# Patient Record
Sex: MALE | Race: BLACK OR AFRICAN AMERICAN | NOT HISPANIC OR LATINO | Employment: UNEMPLOYED | URBAN - METROPOLITAN AREA
[De-identification: names, ages, dates, MRNs, and addresses within clinical notes are randomized per-mention and may not be internally consistent; named-entity substitution may affect disease eponyms.]

---

## 2020-10-02 ENCOUNTER — TRANSCRIBE ORDERS (OUTPATIENT)
Dept: LAB | Facility: HOSPITAL | Age: 40
End: 2020-10-02

## 2020-10-02 ENCOUNTER — LAB (OUTPATIENT)
Dept: LAB | Facility: HOSPITAL | Age: 40
End: 2020-10-02

## 2020-10-02 DIAGNOSIS — E78.5 HYPERLIPIDEMIA, UNSPECIFIED HYPERLIPIDEMIA TYPE: ICD-10-CM

## 2020-10-02 DIAGNOSIS — E78.5 HYPERLIPIDEMIA, UNSPECIFIED HYPERLIPIDEMIA TYPE: Primary | ICD-10-CM

## 2020-10-02 LAB
ALBUMIN SERPL-MCNC: 4.2 G/DL (ref 3.5–5.2)
ALP SERPL-CCNC: 52 U/L (ref 39–117)
ALT SERPL W P-5'-P-CCNC: 17 U/L (ref 1–41)
AST SERPL-CCNC: 18 U/L (ref 1–40)
BILIRUB CONJ SERPL-MCNC: <0.2 MG/DL (ref 0–0.3)
BILIRUB INDIRECT SERPL-MCNC: NORMAL MG/DL
BILIRUB SERPL-MCNC: 0.4 MG/DL (ref 0–1.2)
CHOLEST SERPL-MCNC: 86 MG/DL (ref 0–200)
HDLC SERPL-MCNC: 27 MG/DL (ref 40–60)
LDLC SERPL CALC-MCNC: 32 MG/DL (ref 0–100)
LDLC/HDLC SERPL: 1.19 {RATIO}
PROT SERPL-MCNC: 7.4 G/DL (ref 6–8.5)
TRIGL SERPL-MCNC: 135 MG/DL (ref 0–150)
VLDLC SERPL-MCNC: 27 MG/DL (ref 5–40)

## 2020-10-02 PROCEDURE — 36415 COLL VENOUS BLD VENIPUNCTURE: CPT

## 2020-10-02 PROCEDURE — 80061 LIPID PANEL: CPT | Performed by: INTERNAL MEDICINE

## 2020-10-02 PROCEDURE — 80076 HEPATIC FUNCTION PANEL: CPT | Performed by: INTERNAL MEDICINE

## 2021-01-25 ENCOUNTER — TRANSCRIBE ORDERS (OUTPATIENT)
Dept: PHYSICAL THERAPY | Facility: CLINIC | Age: 41
End: 2021-01-25

## 2021-01-25 DIAGNOSIS — M54.50 LOW BACK PAIN, UNSPECIFIED BACK PAIN LATERALITY, UNSPECIFIED CHRONICITY, UNSPECIFIED WHETHER SCIATICA PRESENT: Primary | ICD-10-CM

## 2021-01-28 ENCOUNTER — TREATMENT (OUTPATIENT)
Dept: PHYSICAL THERAPY | Facility: CLINIC | Age: 41
End: 2021-01-28

## 2021-01-28 DIAGNOSIS — M54.16 RADICULOPATHY, LUMBAR REGION: Primary | ICD-10-CM

## 2021-01-28 PROCEDURE — 97163 PT EVAL HIGH COMPLEX 45 MIN: CPT | Performed by: PHYSICAL THERAPIST

## 2021-01-28 PROCEDURE — 97110 THERAPEUTIC EXERCISES: CPT | Performed by: PHYSICAL THERAPIST

## 2021-01-28 PROCEDURE — 97140 MANUAL THERAPY 1/> REGIONS: CPT | Performed by: PHYSICAL THERAPIST

## 2021-01-28 NOTE — PROGRESS NOTES
Physical Therapy Initial Evaluation and Plan of Care      Patient: Olivier Swift   : 1980  Diagnosis/ICD-10 Code:  Radiculopathy, lumbar region [M54.16]  Referring practitioner: [facility] Beckwith    Subjective Evaluation    History of Present Illness  Date of onset: 2015  Mechanism of injury: Insidious in nature    Subjective comment: Reports having low back pain going back into his early 20s.  States that the symptoms used to be a locking sensation that would eventually go away in a few days.  Has had more severe low back pain and left leg pain that has been present since .  Pain travels down the Denies fever, chills, night sweats and changes in B/B function.  Patient Occupation: Not working Quality of life: good    Pain  At worst pain rating: 10  Quality: radiating, sharp, tight and pulling  Alleviating factors: Sitting; lying on R side.  Exacerbated by: Sitting >60 min; lying on back; walking; standing; pushups; situps (puling in back)  Progression: worsening    Social Support  Lives in: apartment  Lives with: young children and spouse    Treatments  Previous treatment: medication  Patient Goals  Patient goals for therapy: return to sport/leisure activities, decreased pain and increased motion             Objective          Neurological Testing     Reflexes   Left   Patellar (L4): absent (0)  Achilles (S1): absent (0)  Clonus sign: negative    Right   Patellar (L4): absent (0)  Achilles (S1): absent (0)  Clonus sign: negative    Additional Neurological Details  (+) Slump L ankle DF->pulling in lateral lower leg and L/S    Active Range of Motion     Lumbar   Flexion: 70 degrees   Extension: 5 (upper L/S p!) degrees   Left lateral flexion: 45 degrees   Right lateral flexion: 20 degrees     Strength/Myotome Testing     Left Ankle/Foot   Great toe extension: 5    Right Ankle/Foot   Dorsiflexion: 3- (pulling in lower leg prevents full motion)  Great toe extension: 5          Assessment & Plan      Assessment  Impairments: abnormal or restricted ROM, lacks appropriate home exercise program and pain with function  Impairments comments: no trunk rotation  Assessment details: Mr. Swift is a 40 year old male that presents to physical therapy with a longstanding history of low back pain since his early 20s and a 5 year history of L LE pain.  PMH was covered during interview.  No constitutional signs or symptoms present.  Neurological exam is unremarkable.  (+) Slump test in L LE for reproduction of L LE pain along lateral thigh and lateral lower leg.  L/S AROM is limited into extension.  Focus of care will be placed on reducing L LE pain, improving spinal mobility and progress to trunk and hip mm activation strategies.    Prognosis: good  Functional Limitations: carrying objects, lifting, sleeping, walking, pulling, pushing, uncomfortable because of pain, sitting and unable to perform repetitive tasks  Goals  Plan Goals: STGs:  1.)  SANCHEZ improved x 1 MCID in 4 weeks.  2.)  Self report at least 50% improvement in 6 weeks.  LTGs:  1.)  Have no c/o L LE pain in 8 weeks.  2.)  Report no sleep disturbance from low back or left leg pain in 10 weeks.  3.)  Return to a personal exercise program and all ADLs/iADLs without pain or limitations in 12 weeks.    Plan  Therapy options: will be seen for skilled physical therapy services  Planned modality interventions: thermotherapy (hydrocollator packs), cryotherapy and high voltage pulsed current (pain management)  Planned therapy interventions: abdominal trunk stabilization, manual therapy, motor coordination training, neuromuscular re-education, therapeutic activities, transfer training, home exercise program, gait training and ADL retraining  Frequency: 2x week  Duration in visits: 20  Duration in weeks: 10  Treatment plan discussed with: patient        Manual Therapy:    12     mins  32556;  Therapeutic Exercise:    15     mins  60621;     Neuromuscular Ana Paula:         mins  12340;    Therapeutic Activity:          mins  93084;     Gait Training:           mins  81181;     Ultrasound:          mins  16596;    Electrical Stimulation:         mins  33449 ( );  Dry Needling          mins self-pay    Timed Treatment:   27   mins   Total Treatment:     60   mins    PT SIGNATURE: Gomez Agustin, PT   DATE TREATMENT INITIATED: 1/28/2021    Initial Certification Certification Period: 4/28/2021  I certify that the therapy services are furnished while this patient is under my care.  The services outlined above are required by this patient, and will be reviewed every 90 days.     PHYSICIAN: Pastora Carbajal, APRN      DATE:     Please sign and return via fax to [phone].. Thank you, [facility].

## 2021-02-05 ENCOUNTER — TREATMENT (OUTPATIENT)
Dept: PHYSICAL THERAPY | Facility: CLINIC | Age: 41
End: 2021-02-05

## 2021-02-05 DIAGNOSIS — M54.42 CHRONIC MIDLINE LOW BACK PAIN WITH LEFT-SIDED SCIATICA: Primary | ICD-10-CM

## 2021-02-05 DIAGNOSIS — G89.29 CHRONIC MIDLINE LOW BACK PAIN WITH LEFT-SIDED SCIATICA: Primary | ICD-10-CM

## 2021-02-05 PROCEDURE — 97140 MANUAL THERAPY 1/> REGIONS: CPT | Performed by: PHYSICAL THERAPIST

## 2021-02-05 PROCEDURE — 97530 THERAPEUTIC ACTIVITIES: CPT | Performed by: PHYSICAL THERAPIST

## 2021-02-05 PROCEDURE — 97110 THERAPEUTIC EXERCISES: CPT | Performed by: PHYSICAL THERAPIST

## 2021-02-05 NOTE — PROGRESS NOTES
Physical Therapy Daily Progress Note    Patient: Olivier Swift   : 1980  Diagnosis/ICD-10 Code:  Chronic midline low back pain with left-sided sciatica [M54.42, G89.29]  Referring practitioner: [facility] Beckwith  Date of Initial Visit: Type: THERAPY  Noted: 2021  Today's Date: 2021  Patient seen for 2 sessions         Olivier Swift reports that he felt soreness in his low back after his last visit.  Has worked out at home, and has had muscle soreness from his workout.  No longer using mm relaxer or NSAIDs prescribed by the [facility] as they were not changing his symptoms.      Subjective     Objective   See Exercise, Manual, and Modality Logs for complete treatment.       Assessment/Plan  Very sensitive along the L lower L/S, L lateral thigh and L lateral lower leg to skin sliding.  Added more transverse and sagittal plane motions in the trunk to improve reduce tissue stiffness.             Manual Therapy:    10     mins  89598;  Therapeutic Exercise:    15     mins  16188;     Neuromuscular Ana Paula:        mins  57256;    Therapeutic Activity:     15     mins  28511;     Gait Training:           mins  19232;     Ultrasound:          mins  02450;    Electrical Stimulation:         mins  18566 ( );  Dry Needling          mins self-pay    Timed Treatment:   40   mins   Total Treatment:     40   mins    Gomez Agustin PT  Physical Therapist

## 2021-03-08 ENCOUNTER — TREATMENT (OUTPATIENT)
Dept: PHYSICAL THERAPY | Facility: CLINIC | Age: 41
End: 2021-03-08

## 2021-03-08 DIAGNOSIS — G89.29 CHRONIC MIDLINE LOW BACK PAIN WITH LEFT-SIDED SCIATICA: Primary | ICD-10-CM

## 2021-03-08 DIAGNOSIS — M54.42 CHRONIC MIDLINE LOW BACK PAIN WITH LEFT-SIDED SCIATICA: Primary | ICD-10-CM

## 2021-03-08 PROCEDURE — 97140 MANUAL THERAPY 1/> REGIONS: CPT | Performed by: PHYSICAL THERAPIST

## 2021-03-08 PROCEDURE — 97530 THERAPEUTIC ACTIVITIES: CPT | Performed by: PHYSICAL THERAPIST

## 2021-03-08 PROCEDURE — 97110 THERAPEUTIC EXERCISES: CPT | Performed by: PHYSICAL THERAPIST

## 2021-03-08 NOTE — PROGRESS NOTES
Physical Therapy Daily Progress Note    Patient: Olivier Swift   : 1980  Diagnosis/ICD-10 Code:  Chronic midline low back pain with left-sided sciatica [M54.42, G89.29]  Referring practitioner: [facility] Beckwith  Date of Initial Visit: Type: THERAPY  Noted: 2021  Today's Date: 3/8/2021  Patient seen for 3 sessions         Olivier Swift reports that he feels better in terms of being able to bend over and touch his feet.  States that he does have stiffness and pain in the back of the thigh to the back of the ankle when driving (going back and forth from gas to brake).  Feels about 50% improvement since starting physical therapy.      Subjective     Objective   See Exercise, Manual, and Modality Logs for complete treatment.   *SANCHEZ:  44% (21):  SANCHEZ (3/8/21):  26%  *AROM trunk flx/ext:  80 deg/15 deg  *(+) Slump->full ankle DF->20 deg knee ext    Assessment/Plan   Mr. Swift is a 40 year old male that has attended physical therapy for a total of 3 visits for L/S and L LE radiculopathic and pain/symptoms.  Trunk mobility has improved in the sagittal plane, still has stiffness and pain in the lower leg along the tibial nn pathway with slump testing.  He is ambulating more with trunk rotation.  Will focus care on improving L LE neural structure mobility and sensitivity before discharge to [facility].         Manual Therapy:    10     mins  65697;  Therapeutic Exercise:    20     mins  25900;     Neuromuscular Ana Paula:        mins  14025;    Therapeutic Activity:     15     mins  71167;     Gait Training:           mins  12128;     Ultrasound:          mins  89824;    Electrical Stimulation:         mins  88558 ( );  Dry Needling          mins self-pay    Timed Treatment:   45   mins   Total Treatment:     45   mins    Gomez Agustin, PT  Physical Therapist

## 2021-03-12 ENCOUNTER — TREATMENT (OUTPATIENT)
Dept: PHYSICAL THERAPY | Facility: CLINIC | Age: 41
End: 2021-03-12

## 2021-03-12 DIAGNOSIS — M54.42 CHRONIC MIDLINE LOW BACK PAIN WITH LEFT-SIDED SCIATICA: Primary | ICD-10-CM

## 2021-03-12 DIAGNOSIS — G89.29 CHRONIC MIDLINE LOW BACK PAIN WITH LEFT-SIDED SCIATICA: Primary | ICD-10-CM

## 2021-03-12 PROCEDURE — 97140 MANUAL THERAPY 1/> REGIONS: CPT | Performed by: PHYSICAL THERAPIST

## 2021-03-12 PROCEDURE — 97110 THERAPEUTIC EXERCISES: CPT | Performed by: PHYSICAL THERAPIST

## 2021-03-14 NOTE — PROGRESS NOTES
Physical Therapy Daily Progress Note    Patient: Olivier Swift   : 1980  Diagnosis/ICD-10 Code:  Chronic midline low back pain with left-sided sciatica [M54.42, G89.29]  Referring practitioner: HILL Beckwith  Date of Initial Visit: Type: THERAPY  Noted: 2021  Today's Date: 3/14/2021  Patient seen for 4 sessions         Olivier Swift reports that he is able to perform his exercises at home with more ease and mobility in his trunk.  Is able to bend down to the floor and reach with much less stiffness.  Still has a day after physical therapy visit with increased stiffness and soreness.    Subjective     Objective   See Exercise, Manual, and Modality Logs for complete treatment.       Assessment/Plan  Focused visit on decreasing skin sensitivity along the posterior trunk and L lateral hip, thigh and lower leg.  Combined with review of HEP in terms of dosage and rationale.    Manual Therapy:    30     mins  37744;  Therapeutic Exercise:    9     mins  87219;     Neuromuscular Ana Paula:        mins  63453;    Therapeutic Activity:          mins  61930;     Gait Training:           mins  43133;     Ultrasound:          mins  73311;    Electrical Stimulation:         mins  56928 ( );  Dry Needling          mins self-pay    Timed Treatment:   39   mins   Total Treatment:     39   mins    Gomez Agustin, PT  Physical Therapist

## 2021-03-22 ENCOUNTER — TREATMENT (OUTPATIENT)
Dept: PHYSICAL THERAPY | Facility: CLINIC | Age: 41
End: 2021-03-22

## 2021-03-22 DIAGNOSIS — G89.29 CHRONIC MIDLINE LOW BACK PAIN WITH LEFT-SIDED SCIATICA: Primary | ICD-10-CM

## 2021-03-22 DIAGNOSIS — M54.42 CHRONIC MIDLINE LOW BACK PAIN WITH LEFT-SIDED SCIATICA: Primary | ICD-10-CM

## 2021-03-22 PROCEDURE — 97110 THERAPEUTIC EXERCISES: CPT | Performed by: PHYSICAL THERAPIST

## 2021-03-22 PROCEDURE — 97530 THERAPEUTIC ACTIVITIES: CPT | Performed by: PHYSICAL THERAPIST

## 2021-03-22 PROCEDURE — 97140 MANUAL THERAPY 1/> REGIONS: CPT | Performed by: PHYSICAL THERAPIST

## 2021-03-22 NOTE — PROGRESS NOTES
Physical Therapy Daily Progress Note    Patient: Olivier Swift   : 1980  Diagnosis/ICD-10 Code:  Chronic midline low back pain with left-sided sciatica [M54.42, G89.29]  Referring practitioner: HILL Beckwith  Date of Initial Visit: Type: THERAPY  Noted: 2021  Today's Date: 3/22/2021  Patient seen for 5 sessions         States that his low back pain is still improving, has less stiffness in his spine and is able to workout with less difficulty at home.      Subjective     Objective   See Exercise, Manual, and Modality Logs for complete treatment.       Assessment/Plan  Focused on improving sensitivity along the trunk and L LE.  Combined with improving trunk rotation mobility, loading tolerance of the L/S and improving neurodynamics of the L LE.         Manual Therapy:    12     mins  93887;  Therapeutic Exercise:    15     mins  86723;     Neuromuscular Ana Paula:        mins  68687;    Therapeutic Activity:     15     mins  96043;     Gait Training:           mins  83799;     Ultrasound:          mins  91020;    Electrical Stimulation:        mins  02859 ( );  Dry Needling          mins self-pay    Timed Treatment:   42   mins   Total Treatment:     42   mins    Gomez Agustin, MINA  Physical Therapist

## 2021-04-30 ENCOUNTER — TREATMENT (OUTPATIENT)
Dept: PHYSICAL THERAPY | Facility: CLINIC | Age: 41
End: 2021-04-30

## 2021-04-30 DIAGNOSIS — M54.42 CHRONIC MIDLINE LOW BACK PAIN WITH LEFT-SIDED SCIATICA: Primary | ICD-10-CM

## 2021-04-30 DIAGNOSIS — G89.29 CHRONIC MIDLINE LOW BACK PAIN WITH LEFT-SIDED SCIATICA: Primary | ICD-10-CM

## 2021-04-30 PROCEDURE — 97110 THERAPEUTIC EXERCISES: CPT | Performed by: PHYSICAL THERAPIST

## 2021-04-30 PROCEDURE — 97530 THERAPEUTIC ACTIVITIES: CPT | Performed by: PHYSICAL THERAPIST

## 2021-05-13 ENCOUNTER — HOSPITAL ENCOUNTER (EMERGENCY)
Facility: HOSPITAL | Age: 41
Discharge: HOME | End: 2021-05-13
Attending: EMERGENCY MEDICINE | Admitting: EMERGENCY MEDICINE

## 2021-05-13 VITALS
TEMPERATURE: 97.8 F | OXYGEN SATURATION: 96 % | SYSTOLIC BLOOD PRESSURE: 132 MMHG | WEIGHT: 252 LBS | RESPIRATION RATE: 16 BRPM | DIASTOLIC BLOOD PRESSURE: 71 MMHG | HEART RATE: 75 BPM | BODY MASS INDEX: 41.99 KG/M2 | HEIGHT: 65 IN

## 2021-05-13 DIAGNOSIS — E11.65 TYPE 2 DIABETES MELLITUS WITH HYPERGLYCEMIA, WITHOUT LONG-TERM CURRENT USE OF INSULIN (HCC): ICD-10-CM

## 2021-05-13 DIAGNOSIS — E11.9 NEW ONSET TYPE 2 DIABETES MELLITUS (HCC): Primary | ICD-10-CM

## 2021-05-13 LAB
ALBUMIN SERPL-MCNC: 3.9 G/DL (ref 3.5–5.2)
ALBUMIN/GLOB SERPL: 1.7 G/DL
ALP SERPL-CCNC: 83 U/L (ref 39–117)
ALT SERPL W P-5'-P-CCNC: 26 U/L (ref 1–41)
AMPHET+METHAMPHET UR QL: NEGATIVE
AMPHETAMINES UR QL: NEGATIVE
ANION GAP SERPL CALCULATED.3IONS-SCNC: 14 MMOL/L (ref 5–15)
AST SERPL-CCNC: 17 U/L (ref 1–40)
BARBITURATES UR QL SCN: NEGATIVE
BASOPHILS # BLD AUTO: 0.03 10*3/MM3 (ref 0–0.2)
BASOPHILS NFR BLD AUTO: 0.4 % (ref 0–1.5)
BENZODIAZ UR QL SCN: NEGATIVE
BILIRUB SERPL-MCNC: 0.3 MG/DL (ref 0–1.2)
BILIRUB UR QL STRIP: NEGATIVE
BUN SERPL-MCNC: 14 MG/DL (ref 6–20)
BUN/CREAT SERPL: 10.8 (ref 7–25)
BUPRENORPHINE SERPL-MCNC: NEGATIVE NG/ML
CALCIUM SPEC-SCNC: 9.1 MG/DL (ref 8.6–10.5)
CANNABINOIDS SERPL QL: NEGATIVE
CHLORIDE SERPL-SCNC: 95 MMOL/L (ref 98–107)
CLARITY UR: CLEAR
CO2 SERPL-SCNC: 23 MMOL/L (ref 22–29)
COCAINE UR QL: NEGATIVE
COLOR UR: YELLOW
CREAT SERPL-MCNC: 1.3 MG/DL (ref 0.76–1.27)
DEPRECATED RDW RBC AUTO: 42.9 FL (ref 37–54)
EOSINOPHIL # BLD AUTO: 0.02 10*3/MM3 (ref 0–0.4)
EOSINOPHIL NFR BLD AUTO: 0.3 % (ref 0.3–6.2)
ERYTHROCYTE [DISTWIDTH] IN BLOOD BY AUTOMATED COUNT: 13.5 % (ref 12.3–15.4)
ETHANOL BLD-MCNC: <10 MG/DL (ref 0–10)
GFR SERPL CREATININE-BSD FRML MDRD: 74 ML/MIN/1.73
GLOBULIN UR ELPH-MCNC: 2.3 GM/DL
GLUCOSE BLDC GLUCOMTR-MCNC: 344 MG/DL (ref 70–130)
GLUCOSE SERPL-MCNC: 684 MG/DL (ref 65–99)
GLUCOSE UR STRIP-MCNC: ABNORMAL MG/DL
HBA1C MFR BLD: 12.2 % (ref 4.8–5.6)
HCT VFR BLD AUTO: 42.8 % (ref 37.5–51)
HGB BLD-MCNC: 14.4 G/DL (ref 13–17.7)
HGB UR QL STRIP.AUTO: NEGATIVE
HOLD SPECIMEN: NORMAL
IMM GRANULOCYTES # BLD AUTO: 0.02 10*3/MM3 (ref 0–0.05)
IMM GRANULOCYTES NFR BLD AUTO: 0.3 % (ref 0–0.5)
KETONES UR QL STRIP: ABNORMAL
LEUKOCYTE ESTERASE UR QL STRIP.AUTO: NEGATIVE
LYMPHOCYTES # BLD AUTO: 3.21 10*3/MM3 (ref 0.7–3.1)
LYMPHOCYTES NFR BLD AUTO: 44 % (ref 19.6–45.3)
MAGNESIUM SERPL-MCNC: 1.9 MG/DL (ref 1.6–2.6)
MCH RBC QN AUTO: 29.3 PG (ref 26.6–33)
MCHC RBC AUTO-ENTMCNC: 33.6 G/DL (ref 31.5–35.7)
MCV RBC AUTO: 87.2 FL (ref 79–97)
METHADONE UR QL SCN: NEGATIVE
MONOCYTES # BLD AUTO: 0.56 10*3/MM3 (ref 0.1–0.9)
MONOCYTES NFR BLD AUTO: 7.7 % (ref 5–12)
NEUTROPHILS NFR BLD AUTO: 3.46 10*3/MM3 (ref 1.7–7)
NEUTROPHILS NFR BLD AUTO: 47.3 % (ref 42.7–76)
NITRITE UR QL STRIP: NEGATIVE
NRBC BLD AUTO-RTO: 0 /100 WBC (ref 0–0.2)
OPIATES UR QL: NEGATIVE
OXYCODONE UR QL SCN: NEGATIVE
PCP UR QL SCN: NEGATIVE
PH UR STRIP.AUTO: <=5 [PH] (ref 5–8)
PHOSPHATE SERPL-MCNC: 4.3 MG/DL (ref 2.5–4.5)
PLATELET # BLD AUTO: 145 10*3/MM3 (ref 140–450)
PMV BLD AUTO: 14.5 FL (ref 6–12)
POTASSIUM SERPL-SCNC: 4.5 MMOL/L (ref 3.5–5.2)
PROPOXYPH UR QL: NEGATIVE
PROT SERPL-MCNC: 6.2 G/DL (ref 6–8.5)
PROT UR QL STRIP: NEGATIVE
RBC # BLD AUTO: 4.91 10*6/MM3 (ref 4.14–5.8)
SODIUM SERPL-SCNC: 132 MMOL/L (ref 136–145)
SP GR UR STRIP: 1.04 (ref 1–1.03)
T4 FREE SERPL-MCNC: 1.27 NG/DL (ref 0.93–1.7)
TRICYCLICS UR QL SCN: NEGATIVE
TSH SERPL DL<=0.05 MIU/L-ACNC: 4.06 UIU/ML (ref 0.27–4.2)
UROBILINOGEN UR QL STRIP: ABNORMAL
VALPROATE SERPL-MCNC: 66.6 MCG/ML (ref 50–125)
WBC # BLD AUTO: 7.3 10*3/MM3 (ref 3.4–10.8)
WHOLE BLOOD HOLD SPECIMEN: NORMAL
WHOLE BLOOD HOLD SPECIMEN: NORMAL

## 2021-05-13 PROCEDURE — 81003 URINALYSIS AUTO W/O SCOPE: CPT

## 2021-05-13 PROCEDURE — 84439 ASSAY OF FREE THYROXINE: CPT | Performed by: EMERGENCY MEDICINE

## 2021-05-13 PROCEDURE — 82077 ASSAY SPEC XCP UR&BREATH IA: CPT | Performed by: EMERGENCY MEDICINE

## 2021-05-13 PROCEDURE — 85025 COMPLETE CBC W/AUTO DIFF WBC: CPT | Performed by: EMERGENCY MEDICINE

## 2021-05-13 PROCEDURE — 83735 ASSAY OF MAGNESIUM: CPT | Performed by: EMERGENCY MEDICINE

## 2021-05-13 PROCEDURE — G0108 DIAB MANAGE TRN  PER INDIV: HCPCS

## 2021-05-13 PROCEDURE — 80053 COMPREHEN METABOLIC PANEL: CPT | Performed by: EMERGENCY MEDICINE

## 2021-05-13 PROCEDURE — 84443 ASSAY THYROID STIM HORMONE: CPT | Performed by: EMERGENCY MEDICINE

## 2021-05-13 PROCEDURE — 84100 ASSAY OF PHOSPHORUS: CPT | Performed by: EMERGENCY MEDICINE

## 2021-05-13 PROCEDURE — 82962 GLUCOSE BLOOD TEST: CPT

## 2021-05-13 PROCEDURE — 83036 HEMOGLOBIN GLYCOSYLATED A1C: CPT | Performed by: EMERGENCY MEDICINE

## 2021-05-13 PROCEDURE — 63710000001 INSULIN REGULAR HUMAN PER 5 UNITS: Performed by: EMERGENCY MEDICINE

## 2021-05-13 PROCEDURE — 80164 ASSAY DIPROPYLACETIC ACD TOT: CPT | Performed by: EMERGENCY MEDICINE

## 2021-05-13 PROCEDURE — 80306 DRUG TEST PRSMV INSTRMNT: CPT | Performed by: EMERGENCY MEDICINE

## 2021-05-13 PROCEDURE — 99283 EMERGENCY DEPT VISIT LOW MDM: CPT

## 2021-05-13 RX ORDER — ATORVASTATIN CALCIUM 80 MG/1
80 TABLET, FILM COATED ORAL DAILY
COMMUNITY

## 2021-05-13 RX ORDER — PRAZOSIN HYDROCHLORIDE 2 MG/1
2 CAPSULE ORAL NIGHTLY
COMMUNITY

## 2021-05-13 RX ORDER — DIVALPROEX SODIUM 500 MG/1
500 TABLET, DELAYED RELEASE ORAL 3 TIMES DAILY
COMMUNITY

## 2021-05-13 RX ORDER — SODIUM CHLORIDE 0.9 % (FLUSH) 0.9 %
10 SYRINGE (ML) INJECTION AS NEEDED
Status: DISCONTINUED | OUTPATIENT
Start: 2021-05-13 | End: 2021-05-13 | Stop reason: HOSPADM

## 2021-05-13 RX ORDER — AMLODIPINE BESYLATE 5 MG/1
5 TABLET ORAL DAILY
COMMUNITY

## 2021-05-13 RX ORDER — QUETIAPINE FUMARATE 100 MG/1
100 TABLET, FILM COATED ORAL NIGHTLY
COMMUNITY

## 2021-05-13 RX ORDER — HYDROCHLOROTHIAZIDE 25 MG/1
25 TABLET ORAL DAILY
COMMUNITY

## 2021-05-13 RX ORDER — METHOCARBAMOL 750 MG/1
750 TABLET, FILM COATED ORAL 4 TIMES DAILY PRN
COMMUNITY
End: 2022-04-18

## 2021-05-13 RX ADMIN — SODIUM CHLORIDE, POTASSIUM CHLORIDE, SODIUM LACTATE AND CALCIUM CHLORIDE 2000 ML: 600; 310; 30; 20 INJECTION, SOLUTION INTRAVENOUS at 12:29

## 2021-05-13 RX ADMIN — INSULIN HUMAN 10 UNITS: 100 INJECTION, SOLUTION PARENTERAL at 12:31

## 2021-05-18 ENCOUNTER — NURSE TRIAGE (OUTPATIENT)
Dept: CALL CENTER | Facility: HOSPITAL | Age: 41
End: 2021-05-18

## 2021-05-18 LAB
ALBUMIN SERPL-MCNC: 4 G/DL (ref 3.5–5.2)
ALBUMIN/GLOB SERPL: 1.5 G/DL
ALP SERPL-CCNC: 68 U/L (ref 39–117)
ALT SERPL W P-5'-P-CCNC: 22 U/L (ref 1–41)
ANION GAP SERPL CALCULATED.3IONS-SCNC: 11 MMOL/L (ref 5–15)
AST SERPL-CCNC: 19 U/L (ref 1–40)
B-OH-BUTYR SERPL-SCNC: 0.49 MMOL/L (ref 0.02–0.27)
BASOPHILS # BLD AUTO: 0.04 10*3/MM3 (ref 0–0.2)
BASOPHILS NFR BLD AUTO: 0.5 % (ref 0–1.5)
BILIRUB SERPL-MCNC: <0.2 MG/DL (ref 0–1.2)
BUN SERPL-MCNC: 16 MG/DL (ref 6–20)
BUN/CREAT SERPL: 10.5 (ref 7–25)
CALCIUM SPEC-SCNC: 9.2 MG/DL (ref 8.6–10.5)
CHLORIDE SERPL-SCNC: 97 MMOL/L (ref 98–107)
CO2 SERPL-SCNC: 26 MMOL/L (ref 22–29)
CREAT SERPL-MCNC: 1.53 MG/DL (ref 0.76–1.27)
DEPRECATED RDW RBC AUTO: 44.2 FL (ref 37–54)
EOSINOPHIL # BLD AUTO: 0.02 10*3/MM3 (ref 0–0.4)
EOSINOPHIL NFR BLD AUTO: 0.2 % (ref 0.3–6.2)
ERYTHROCYTE [DISTWIDTH] IN BLOOD BY AUTOMATED COUNT: 13.7 % (ref 12.3–15.4)
GFR SERPL CREATININE-BSD FRML MDRD: 61 ML/MIN/1.73
GLOBULIN UR ELPH-MCNC: 2.7 GM/DL
GLUCOSE BLDC GLUCOMTR-MCNC: 487 MG/DL (ref 70–130)
GLUCOSE SERPL-MCNC: 536 MG/DL (ref 65–99)
HCT VFR BLD AUTO: 44.8 % (ref 37.5–51)
HGB BLD-MCNC: 15.1 G/DL (ref 13–17.7)
HOLD SPECIMEN: NORMAL
HOLD SPECIMEN: NORMAL
IMM GRANULOCYTES # BLD AUTO: 0.02 10*3/MM3 (ref 0–0.05)
IMM GRANULOCYTES NFR BLD AUTO: 0.2 % (ref 0–0.5)
LYMPHOCYTES # BLD AUTO: 3.49 10*3/MM3 (ref 0.7–3.1)
LYMPHOCYTES NFR BLD AUTO: 40.8 % (ref 19.6–45.3)
MCH RBC QN AUTO: 30 PG (ref 26.6–33)
MCHC RBC AUTO-ENTMCNC: 33.7 G/DL (ref 31.5–35.7)
MCV RBC AUTO: 89.1 FL (ref 79–97)
MONOCYTES # BLD AUTO: 0.87 10*3/MM3 (ref 0.1–0.9)
MONOCYTES NFR BLD AUTO: 10.2 % (ref 5–12)
NEUTROPHILS NFR BLD AUTO: 4.11 10*3/MM3 (ref 1.7–7)
NEUTROPHILS NFR BLD AUTO: 48.1 % (ref 42.7–76)
NRBC BLD AUTO-RTO: 0 /100 WBC (ref 0–0.2)
PLATELET # BLD AUTO: 159 10*3/MM3 (ref 140–450)
PMV BLD AUTO: 14.1 FL (ref 6–12)
POTASSIUM SERPL-SCNC: 4.4 MMOL/L (ref 3.5–5.2)
PROT SERPL-MCNC: 6.7 G/DL (ref 6–8.5)
RBC # BLD AUTO: 5.03 10*6/MM3 (ref 4.14–5.8)
SODIUM SERPL-SCNC: 134 MMOL/L (ref 136–145)
WBC # BLD AUTO: 8.55 10*3/MM3 (ref 3.4–10.8)
WHOLE BLOOD HOLD SPECIMEN: NORMAL
WHOLE BLOOD HOLD SPECIMEN: NORMAL

## 2021-05-18 PROCEDURE — 82962 GLUCOSE BLOOD TEST: CPT

## 2021-05-18 PROCEDURE — 82010 KETONE BODYS QUAN: CPT

## 2021-05-18 PROCEDURE — 80053 COMPREHEN METABOLIC PANEL: CPT

## 2021-05-18 PROCEDURE — 85025 COMPLETE CBC W/AUTO DIFF WBC: CPT

## 2021-05-18 PROCEDURE — 99284 EMERGENCY DEPT VISIT MOD MDM: CPT

## 2021-05-18 RX ORDER — SODIUM CHLORIDE 0.9 % (FLUSH) 0.9 %
10 SYRINGE (ML) INJECTION AS NEEDED
Status: DISCONTINUED | OUTPATIENT
Start: 2021-05-18 | End: 2021-05-19 | Stop reason: HOSPADM

## 2021-05-19 ENCOUNTER — HOSPITAL ENCOUNTER (EMERGENCY)
Facility: HOSPITAL | Age: 41
Discharge: HOME | End: 2021-05-19
Attending: EMERGENCY MEDICINE | Admitting: EMERGENCY MEDICINE

## 2021-05-19 VITALS
HEIGHT: 65 IN | SYSTOLIC BLOOD PRESSURE: 124 MMHG | OXYGEN SATURATION: 95 % | WEIGHT: 250 LBS | RESPIRATION RATE: 18 BRPM | DIASTOLIC BLOOD PRESSURE: 93 MMHG | BODY MASS INDEX: 41.65 KG/M2 | HEART RATE: 62 BPM | TEMPERATURE: 98.7 F

## 2021-05-19 DIAGNOSIS — E86.0 DEHYDRATION: ICD-10-CM

## 2021-05-19 DIAGNOSIS — E11.65 UNCONTROLLED TYPE 2 DIABETES MELLITUS WITH HYPERGLYCEMIA (HCC): ICD-10-CM

## 2021-05-19 DIAGNOSIS — R73.9 HYPERGLYCEMIA: Primary | ICD-10-CM

## 2021-05-19 LAB
ATMOSPHERIC PRESS: ABNORMAL MM[HG]
BASE EXCESS BLDV CALC-SCNC: 3.6 MMOL/L (ref -2–2)
BDY SITE: ABNORMAL
BILIRUB UR QL STRIP: NEGATIVE
BODY TEMPERATURE: 37 C
CLARITY UR: CLEAR
CO2 BLDA-SCNC: 31.6 MMOL/L (ref 22–33)
COHGB MFR BLD: 0.9 %
COLOR UR: YELLOW
EPAP: 0
GLUCOSE BLDC GLUCOMTR-MCNC: 304 MG/DL (ref 70–130)
GLUCOSE BLDC GLUCOMTR-MCNC: 330 MG/DL (ref 70–130)
GLUCOSE BLDC GLUCOMTR-MCNC: 395 MG/DL (ref 70–130)
GLUCOSE UR STRIP-MCNC: ABNORMAL MG/DL
HCO3 BLDV-SCNC: 30.1 MMOL/L (ref 22–28)
HGB BLDA-MCNC: 15.8 G/DL (ref 13.5–17.5)
HGB UR QL STRIP.AUTO: NEGATIVE
HOLD SPECIMEN: NORMAL
INHALED O2 CONCENTRATION: 21 %
IPAP: 0
KETONES UR QL STRIP: ABNORMAL
LEUKOCYTE ESTERASE UR QL STRIP.AUTO: NEGATIVE
METHGB BLD QL: 0.4 %
MODALITY: ABNORMAL
NITRITE UR QL STRIP: NEGATIVE
NOTE: ABNORMAL
OXYHGB MFR BLDV: 65.8 %
PAW @ PEAK INSP FLOW SETTING VENT: 0 CMH2O
PCO2 BLDV: 51.1 MM HG (ref 41–51)
PH BLDV: 7.38 PH UNITS (ref 7.31–7.41)
PH UR STRIP.AUTO: <=5 [PH] (ref 5–8)
PO2 BLDV: 36.4 MM HG (ref 27–53)
PROT UR QL STRIP: NEGATIVE
SP GR UR STRIP: 1.03 (ref 1–1.03)
TOTAL RATE: 0 BREATHS/MINUTE
UROBILINOGEN UR QL STRIP: ABNORMAL

## 2021-05-19 PROCEDURE — 81003 URINALYSIS AUTO W/O SCOPE: CPT | Performed by: EMERGENCY MEDICINE

## 2021-05-19 PROCEDURE — 82805 BLOOD GASES W/O2 SATURATION: CPT

## 2021-05-19 PROCEDURE — 82962 GLUCOSE BLOOD TEST: CPT

## 2021-05-19 PROCEDURE — 82820 HEMOGLOBIN-OXYGEN AFFINITY: CPT

## 2021-05-19 RX ADMIN — SODIUM CHLORIDE 1000 ML: 9 INJECTION, SOLUTION INTRAVENOUS at 03:14

## 2021-05-19 RX ADMIN — SODIUM CHLORIDE 1000 ML: 9 INJECTION, SOLUTION INTRAVENOUS at 04:00

## 2021-05-19 NOTE — TELEPHONE ENCOUNTER
"Just dx with type 2 diabetes last weekend, BS was 600 . I=s now on insulin sees doctor on the 22nd. Blood sugar this morning was over 300 tonight over 500    Reason for Disposition  • Blood glucose > 500 mg/dL (27.8 mmol/L)    Additional Information  • Negative: Unconscious or difficult to awaken  • Negative: Acting confused (e.g., disoriented, slurred speech)  • Negative: Very weak (e.g., can't stand)  • Negative: Sounds like a life-threatening emergency to the triager  • Negative: [1] Vomiting AND [2] signs of dehydration (e.g., very dry mouth, lightheaded, dark urine)  • Negative: [1] Blood glucose > 240 mg/dL (13.3 mmol/L) AND [2] rapid breathing  • Negative: [1] Blood glucose > 240 mg/dL (13.3 mmol/L) AND [2] urine ketones moderate-large (or more than 1+)    Answer Assessment - Initial Assessment Questions  1. BLOOD GLUCOSE: \"What is your blood glucose level?\"       500  2. ONSET: \"When did you check the blood glucose?\"     Just now  3. USUAL RANGE: \"What is your glucose level usually?\" (e.g., usual fasting morning value, usual evening value)       Fasting 300 nighttime 400  Tonight 500 has not had insulin adjusted yet.  4. KETONES: \"Do you check for ketones (urine or blood test strips)?\" If yes, ask: \"What does the test show now?\"       n/a  5. TYPE 1 or 2:  \"Do you know what type of diabetes you have?\"  (e.g., Type 1, Te 2, Gestational; doesn't know)      2 just dx  6. INSULIN: \"Do you take insulin?\" \"What type of insulin(s) do you use? What is the mode of delivery? (syringe, pen; injection or pump)?\"       jardiance she thinks  7. DIABETES PILLS: \"Do you take any pills for your diabetes?\" If yes, ask: \"Have you missed taking any pills recently?\"      no  8. OTHER SYMPTOMS: \"Do you have any symptoms?\" (e.g., fever, frequent urination, difficulty breathing, dizziness, weakness, vomiting)      Increased uriantion  9. PREGNANCY: \"Is there any chance you are pregnant?\" \"When was your last menstrual period?\"      " no    Protocols used: DIABETES - HIGH BLOOD SUGAR-ADULT-AH

## 2021-08-25 ENCOUNTER — OFFICE VISIT (OUTPATIENT)
Dept: ENDOCRINOLOGY | Facility: CLINIC | Age: 41
End: 2021-08-25

## 2021-08-25 VITALS
BODY MASS INDEX: 40.34 KG/M2 | SYSTOLIC BLOOD PRESSURE: 138 MMHG | DIASTOLIC BLOOD PRESSURE: 82 MMHG | OXYGEN SATURATION: 97 % | WEIGHT: 251 LBS | HEART RATE: 94 BPM | HEIGHT: 66 IN

## 2021-08-25 DIAGNOSIS — E78.5 DYSLIPIDEMIA: ICD-10-CM

## 2021-08-25 DIAGNOSIS — E11.9 TYPE 2 DIABETES MELLITUS WITHOUT COMPLICATION, WITH LONG-TERM CURRENT USE OF INSULIN (HCC): Primary | ICD-10-CM

## 2021-08-25 DIAGNOSIS — Z79.4 TYPE 2 DIABETES MELLITUS WITHOUT COMPLICATION, WITH LONG-TERM CURRENT USE OF INSULIN (HCC): Primary | ICD-10-CM

## 2021-08-25 LAB
EXPIRATION DATE: NORMAL
GLUCOSE BLDC GLUCOMTR-MCNC: 115 MG/DL (ref 70–130)
HBA1C MFR BLD: 6.7 %
Lab: NORMAL

## 2021-08-25 PROCEDURE — 82947 ASSAY GLUCOSE BLOOD QUANT: CPT | Performed by: INTERNAL MEDICINE

## 2021-08-25 PROCEDURE — 83036 HEMOGLOBIN GLYCOSYLATED A1C: CPT | Performed by: INTERNAL MEDICINE

## 2021-08-25 PROCEDURE — 99204 OFFICE O/P NEW MOD 45 MIN: CPT | Performed by: INTERNAL MEDICINE

## 2021-08-25 RX ORDER — DULAGLUTIDE 1.5 MG/.5ML
INJECTION, SOLUTION SUBCUTANEOUS WEEKLY
COMMUNITY
End: 2021-08-25 | Stop reason: SDUPTHER

## 2021-08-25 RX ORDER — DULAGLUTIDE 1.5 MG/.5ML
1.5 INJECTION, SOLUTION SUBCUTANEOUS WEEKLY
Qty: 6 ML | Refills: 1 | Status: SHIPPED | OUTPATIENT
Start: 2021-08-25 | End: 2022-08-16

## 2021-08-25 NOTE — PROGRESS NOTES
Chief Complaint   Patient presents with   • Diabetes     New Consult AUBRIE Mac patient who is being seen in consultation regarding type 2 diabetes at the request of Pastora Carbajal APRN HPI   Olivier Swift is a 41 y.o. male who presents for evaluation of type 2 diabetes.    Patient has a history of type 2 diabetes.  This was initially diagnosed in May 2021 after patient presented with to the emergency room due to concern for increased thirst and increased urination.  Patient was initially started on Metformin and Lantus.  Patient is currently taking Lantus 25 units nightly, Trulicity 1.5 mg weekly, Metformin 500 mg twice daily.  This regimen was last changed last week when Lantus dose was reduced.  Patient believes that glycemic control is improving.  Patient reports most recent A1c was greater than 12. Patient reports good adherence to medications.      Patient reports hypoglycemia awareness with symptoms of shakiness and nervousness.  Patient monitors blood glucose 2-3 times daily.  Glucometer downloaded for review with 35 readings in the past 2 weeks ranging , average 117  Patient is working to follow a diabetic diet.  Patient does not exercise regularly.     Patient's last dilated eye exam was within the past year, he reports upcoming follow-up appointment.  Patient denies acute visual changes.  Patient denies foot related concerns such as numbness, tingling, or pain.  History of dyslipidemia: Yes taking atorvastatin 80 mg daily  History of renal dysfunction: No  History of Hypertension: Yes taking lisinopril    Past Medical History:   Diagnosis Date   • Anxiety    • Bipolar depression (CMS/[facility])    • Depression    • Hypertension    • Leaky heart valve    • Sleep apnea      No past surgical history on file.   Family History   Problem Relation Age of Onset   • Diabetes Maternal Grandmother       Social History     Socioeconomic History   • Marital status:      Spouse name: Not on file  "  • Number of children: Not on file   • Years of education: Not on file   • Highest education level: Not on file   Tobacco Use   • Smoking status: Current Some Day Smoker     Types: Cigars   • Smokeless tobacco: Never Used   Substance and Sexual Activity   • Alcohol use: Not Currently   • Drug use: Never   • Sexual activity: Never      Allergies   Allergen Reactions   • Trazodone Other (See Comments)     \" CAUSED ME TO HAVE AN ERECTION\"       Current Outpatient Medications on File Prior to Visit   Medication Sig Dispense Refill   • amLODIPine (NORVASC) 5 MG tablet Take 5 mg by mouth Daily.     • atorvastatin (LIPITOR) 80 MG tablet Take 80 mg by mouth Daily.     • divalproex (DEPAKOTE) 500 MG DR tablet Take 500 mg by mouth 3 (Three) Times a Day.     • hydroCHLOROthiazide (HYDRODIURIL) 25 MG tablet Take 25 mg by mouth Daily.     • LISINOPRIL PO Take 40 mg by mouth.     • methocarbamol (ROBAXIN) 750 MG tablet Take 750 mg by mouth 4 (Four) Times a Day As Needed for Muscle Spasms.     • metoprolol tartrate (LOPRESSOR) 25 MG tablet Take 25 mg by mouth 2 (Two) Times a Day.     • prazosin (MINIPRESS) 2 MG capsule Take 2 mg by mouth Every Night.     • PROPRANOLOL HCL PO Take  by mouth.     • QUEtiapine (SEROquel) 100 MG tablet Take 100 mg by mouth Every Night.     • ranolazine (RANEXA) 500 MG 12 hr tablet Take 500 mg by mouth 2 (Two) Times a Day.     • sertraline (ZOLOFT) 100 MG tablet Take 100 mg by mouth Daily.     • [DISCONTINUED] Dulaglutide (Trulicity) 1.5 MG/0.5ML solution pen-injector Inject  under the skin into the appropriate area as directed 1 (One) Time Per Week.     • [DISCONTINUED] Insulin Glargine (LANTUS SOLOSTAR) 100 UNIT/ML injection pen Inject 20 Units under the skin into the appropriate area as directed Every Night. (Patient taking differently: Inject 25 Units under the skin into the appropriate area as directed Every Night.) 3 mL 1   • [DISCONTINUED] metFORMIN (GLUCOPHAGE) 500 MG tablet Take 1 tablet by " "mouth 2 (Two) Times a Day With Meals. 60 tablet 0     No current facility-administered medications on file prior to visit.        Review of Systems   Constitutional: Positive for appetite change. Negative for fatigue, unexpected weight gain and unexpected weight loss.   HENT: Negative for trouble swallowing and voice change.    Eyes: Negative for pain and visual disturbance.   Respiratory: Negative for cough and shortness of breath.    Cardiovascular: Negative for palpitations and leg swelling.   Gastrointestinal: Negative for constipation, diarrhea and nausea.   Endocrine: Negative for polydipsia and polyuria.   Musculoskeletal: Negative for arthralgias and myalgias.   Skin: Negative for dry skin and rash.   Neurological: Negative for tremors and headache.   Psychiatric/Behavioral: Negative for sleep disturbance. The patient is not nervous/anxious.       Vitals:    08/25/21 0955   BP: 138/82   Pulse: 94   SpO2: 97%   Weight: 114 kg (251 lb)   Height: 167.6 cm (66\")   Body mass index is 40.51 kg/m².     Physical Exam  Vitals reviewed.   Constitutional:       General: He is not in acute distress.     Appearance: Normal appearance.   HENT:      Head: Normocephalic and atraumatic.      Right Ear: Hearing normal.      Left Ear: Hearing normal.      Nose: Nose normal.   Eyes:      General: Lids are normal.      Conjunctiva/sclera: Conjunctivae normal.   Neck:      Thyroid: No thyromegaly or thyroid tenderness.   Cardiovascular:      Rate and Rhythm: Normal rate and regular rhythm.      Heart sounds: No murmur heard.     Pulmonary:      Effort: Pulmonary effort is normal.      Breath sounds: Normal breath sounds and air entry.   Abdominal:      General: Bowel sounds are normal.      Palpations: Abdomen is soft.      Tenderness: There is no abdominal tenderness.   Lymphadenopathy:      Head:      Right side of head: No submandibular adenopathy.      Left side of head: No submandibular adenopathy.      Cervical: No cervical " adenopathy.   Skin:     General: Skin is warm and dry.      Findings: No rash.   Neurological:      General: No focal deficit present.      Deep Tendon Reflexes: Reflexes are normal and symmetric.   Psychiatric:         Mood and Affect: Mood and affect normal.         Behavior: Behavior is cooperative.        Labs/Imaging  Results for SHAHAB HAINES (MRN [id number]) as of 8/25/2021 10:09   Ref. Range 8/25/2021 10:02   Glucose Latest Ref Range: 70 - 130 mg/dL 115   Hemoglobin A1C Latest Units: % 6.7       Assessment and Plan    Diagnoses and all orders for this visit:    1. Type 2 diabetes mellitus without complication, with long-term current use of insulin (CMS/[facility]) (Primary)  -Hemoglobin A1c at goal, 6.7%  -Downloaded glucose data did reveal some hypoglycemia.  However, Lantus has been reduced since this episode.  -Plan to continue Lantus 25 units once daily  -Plan to continue Trulicity 1.5 mg weekly  -Plan to continue Metformin 500 mg twice daily  -Patient was advised to monitor blood sugar 3 times daily.  Patient was instructed to bring glucometer to all future appointments. Patient should contact the clinic between appointments with hypoglycemia or persistent hyperglycemia.  Discussed signs and symptoms of hypoglycemia as well as hypoglycemia management via the rule of 15's.  Discussed potential for long-term complications with uncontrolled diabetes including nephropathy, neuropathy, retinopathy, increased risk for cardiac disease.  Discussed the role of diet and exercise in the management of diabetes.  Patient reports recent screening labs, he was advised to bring these to next visit.  CMP up-to-date from May 2021, EGFR, LFTs normal  Lipid panel up-to-date from October 2020, triglycerides 135, LDL 32, HDL 27  Patient reports eye exam up-to-date within the last year  -     POC Glucose, Blood  -     POC Glycosylated Hemoglobin (Hb A1C)    2. Dyslipidemia  Lipid panel up-to-date from October 2020, continue  atorvastatin 80 mg daily    Other orders  -     Dulaglutide (Trulicity) 1.5 MG/0.5ML solution pen-injector; Inject 1.5 mg under the skin into the appropriate area as directed 1 (One) Time Per Week.  Dispense: 6 mL; Refill: 1  -     Insulin Glargine (LANTUS SOLOSTAR) 100 UNIT/ML injection pen; Inject 25 Units under the skin into the appropriate area as directed Every Night.  Dispense: 27 mL; Refill: 1  -     metFORMIN (GLUCOPHAGE) 500 MG tablet; Take 1 tablet by mouth 2 (Two) Times a Day With Meals.  Dispense: 180 tablet; Refill: 1    Return in about 3 months (around 11/25/2021). The patient was instructed to contact the clinic with any interval questions or concerns.    Daisy Marina MD     Please note that portions of this document were completed using a voice recognition program. Efforts were made to edit the dictations, but occasionally words are mis-transcribed.

## 2021-11-11 ENCOUNTER — APPOINTMENT (OUTPATIENT)
Dept: GENERAL RADIOLOGY | Facility: HOSPITAL | Age: 41
End: 2021-11-11

## 2021-11-11 ENCOUNTER — APPOINTMENT (OUTPATIENT)
Dept: CT IMAGING | Facility: HOSPITAL | Age: 41
End: 2021-11-11

## 2021-11-11 ENCOUNTER — HOSPITAL ENCOUNTER (EMERGENCY)
Facility: HOSPITAL | Age: 41
Discharge: HOME | End: 2021-11-11
Attending: EMERGENCY MEDICINE | Admitting: EMERGENCY MEDICINE

## 2021-11-11 VITALS
DIASTOLIC BLOOD PRESSURE: 72 MMHG | RESPIRATION RATE: 16 BRPM | OXYGEN SATURATION: 98 % | HEART RATE: 80 BPM | WEIGHT: 245 LBS | TEMPERATURE: 96.8 F | HEIGHT: 65 IN | BODY MASS INDEX: 40.82 KG/M2 | SYSTOLIC BLOOD PRESSURE: 106 MMHG

## 2021-11-11 DIAGNOSIS — T50.905A ADVERSE EFFECT OF DRUG, INITIAL ENCOUNTER: ICD-10-CM

## 2021-11-11 DIAGNOSIS — R55 NEAR SYNCOPE: Primary | ICD-10-CM

## 2021-11-11 LAB
ALBUMIN SERPL-MCNC: 3.9 G/DL (ref 3.5–5.2)
ALBUMIN/GLOB SERPL: 1.3 G/DL
ALP SERPL-CCNC: 48 U/L (ref 39–117)
ALT SERPL W P-5'-P-CCNC: 11 U/L (ref 1–41)
ANION GAP SERPL CALCULATED.3IONS-SCNC: 12 MMOL/L (ref 5–15)
AST SERPL-CCNC: 14 U/L (ref 1–40)
BASOPHILS # BLD AUTO: 0.04 10*3/MM3 (ref 0–0.2)
BASOPHILS NFR BLD AUTO: 0.5 % (ref 0–1.5)
BILIRUB SERPL-MCNC: 0.2 MG/DL (ref 0–1.2)
BILIRUB UR QL STRIP: NEGATIVE
BUN SERPL-MCNC: 14 MG/DL (ref 6–20)
BUN/CREAT SERPL: 13.5 (ref 7–25)
CALCIUM SPEC-SCNC: 8.7 MG/DL (ref 8.6–10.5)
CHLORIDE SERPL-SCNC: 105 MMOL/L (ref 98–107)
CLARITY UR: CLEAR
CO2 SERPL-SCNC: 24 MMOL/L (ref 22–29)
COLOR UR: YELLOW
CREAT SERPL-MCNC: 1.04 MG/DL (ref 0.76–1.27)
DEPRECATED RDW RBC AUTO: 47.5 FL (ref 37–54)
EOSINOPHIL # BLD AUTO: 0.06 10*3/MM3 (ref 0–0.4)
EOSINOPHIL NFR BLD AUTO: 0.7 % (ref 0.3–6.2)
ERYTHROCYTE [DISTWIDTH] IN BLOOD BY AUTOMATED COUNT: 14.7 % (ref 12.3–15.4)
GFR SERPL CREATININE-BSD FRML MDRD: 96 ML/MIN/1.73
GLOBULIN UR ELPH-MCNC: 3 GM/DL
GLUCOSE SERPL-MCNC: 85 MG/DL (ref 65–99)
GLUCOSE UR STRIP-MCNC: NEGATIVE MG/DL
HCT VFR BLD AUTO: 42 % (ref 37.5–51)
HGB BLD-MCNC: 13.9 G/DL (ref 13–17.7)
HGB UR QL STRIP.AUTO: NEGATIVE
HOLD SPECIMEN: NORMAL
IMM GRANULOCYTES # BLD AUTO: 0.01 10*3/MM3 (ref 0–0.05)
IMM GRANULOCYTES NFR BLD AUTO: 0.1 % (ref 0–0.5)
KETONES UR QL STRIP: NEGATIVE
LEUKOCYTE ESTERASE UR QL STRIP.AUTO: NEGATIVE
LYMPHOCYTES # BLD AUTO: 3.87 10*3/MM3 (ref 0.7–3.1)
LYMPHOCYTES NFR BLD AUTO: 43.7 % (ref 19.6–45.3)
MAGNESIUM SERPL-MCNC: 2.1 MG/DL (ref 1.6–2.6)
MCH RBC QN AUTO: 29.4 PG (ref 26.6–33)
MCHC RBC AUTO-ENTMCNC: 33.1 G/DL (ref 31.5–35.7)
MCV RBC AUTO: 88.8 FL (ref 79–97)
MONOCYTES # BLD AUTO: 0.72 10*3/MM3 (ref 0.1–0.9)
MONOCYTES NFR BLD AUTO: 8.1 % (ref 5–12)
NEUTROPHILS NFR BLD AUTO: 4.16 10*3/MM3 (ref 1.7–7)
NEUTROPHILS NFR BLD AUTO: 46.9 % (ref 42.7–76)
NITRITE UR QL STRIP: NEGATIVE
NRBC BLD AUTO-RTO: 0 /100 WBC (ref 0–0.2)
PH UR STRIP.AUTO: 5.5 [PH] (ref 5–8)
PLATELET # BLD AUTO: 206 10*3/MM3 (ref 140–450)
PMV BLD AUTO: 13.1 FL (ref 6–12)
POTASSIUM SERPL-SCNC: 3.7 MMOL/L (ref 3.5–5.2)
PROT SERPL-MCNC: 6.9 G/DL (ref 6–8.5)
PROT UR QL STRIP: NEGATIVE
QT INTERVAL: 408 MS
QTC INTERVAL: 437 MS
RBC # BLD AUTO: 4.73 10*6/MM3 (ref 4.14–5.8)
SODIUM SERPL-SCNC: 141 MMOL/L (ref 136–145)
SP GR UR STRIP: 1.02 (ref 1–1.03)
TROPONIN T SERPL-MCNC: <0.01 NG/ML (ref 0–0.03)
UROBILINOGEN UR QL STRIP: NORMAL
VALPROATE SERPL-MCNC: 57.1 MCG/ML (ref 50–125)
WBC # BLD AUTO: 8.86 10*3/MM3 (ref 3.4–10.8)
WHOLE BLOOD HOLD SPECIMEN: NORMAL
WHOLE BLOOD HOLD SPECIMEN: NORMAL

## 2021-11-11 PROCEDURE — 80053 COMPREHEN METABOLIC PANEL: CPT

## 2021-11-11 PROCEDURE — 99283 EMERGENCY DEPT VISIT LOW MDM: CPT

## 2021-11-11 PROCEDURE — 85025 COMPLETE CBC W/AUTO DIFF WBC: CPT

## 2021-11-11 PROCEDURE — 93005 ELECTROCARDIOGRAM TRACING: CPT | Performed by: EMERGENCY MEDICINE

## 2021-11-11 PROCEDURE — 80164 ASSAY DIPROPYLACETIC ACD TOT: CPT

## 2021-11-11 PROCEDURE — 84484 ASSAY OF TROPONIN QUANT: CPT

## 2021-11-11 PROCEDURE — 36415 COLL VENOUS BLD VENIPUNCTURE: CPT

## 2021-11-11 PROCEDURE — 70450 CT HEAD/BRAIN W/O DYE: CPT

## 2021-11-11 PROCEDURE — 71045 X-RAY EXAM CHEST 1 VIEW: CPT

## 2021-11-11 PROCEDURE — 93005 ELECTROCARDIOGRAM TRACING: CPT

## 2021-11-11 PROCEDURE — 83735 ASSAY OF MAGNESIUM: CPT

## 2021-11-11 PROCEDURE — 81003 URINALYSIS AUTO W/O SCOPE: CPT | Performed by: EMERGENCY MEDICINE

## 2021-11-11 RX ORDER — SODIUM CHLORIDE 0.9 % (FLUSH) 0.9 %
10 SYRINGE (ML) INJECTION AS NEEDED
Status: DISCONTINUED | OUTPATIENT
Start: 2021-11-11 | End: 2021-11-11 | Stop reason: HOSPADM

## 2021-11-11 RX ORDER — BACLOFEN 20 MG/1
20 TABLET ORAL 3 TIMES DAILY
COMMUNITY
End: 2021-11-11

## 2021-11-11 RX ORDER — GABAPENTIN 300 MG/1
300 CAPSULE ORAL 3 TIMES DAILY
COMMUNITY
End: 2021-11-11

## 2021-11-12 NOTE — ED PROVIDER NOTES
"Subjective   Pt presents with two near syncopal spells.  He got a new job at [employer], working 2nd shift (5p-2a).  After getting home the last two days (yesterday and today) he had episode of lightheadedness, white spots in vision, generalized weakness and fall.  Today he hit his head when he fell.  Each time he had to sit for several minutes before being able to get up.    He believes it is related to changing what time he takes some of his medications.  He takes Seroquel and prazosin for sleep and is now taking those after getting home at 2am and symptoms were not long after that.    He has not had fever, cough, vomiting, or other acute symptoms.  He has chronic diarrhea that is unchanged.  He has had some back issues and PCP prescribed some meds for that recently but he says they haven't helped.      History provided by:  Patient and spouse      Review of Systems   Constitutional: Negative for fever.   Respiratory: Negative for shortness of breath.    Cardiovascular: Negative for chest pain and palpitations.   Gastrointestinal: Positive for diarrhea. Negative for vomiting.   Musculoskeletal: Positive for back pain.   Neurological: Positive for syncope and weakness.   All other systems reviewed and are negative.      Past Medical History:   Diagnosis Date   • Anxiety    • Bipolar depression (HCC)    • Depression    • Hypertension    • Leaky heart valve    • Sleep apnea        Allergies   Allergen Reactions   • Trazodone Other (See Comments)     \" CAUSED ME TO HAVE AN ERECTION\"        History reviewed. No pertinent surgical history.    Family History   Problem Relation Age of Onset   • Diabetes Maternal Grandmother        Social History     Socioeconomic History   • Marital status:    Tobacco Use   • Smoking status: Current Some Day Smoker     Types: Cigars   • Smokeless tobacco: Never Used   Substance and Sexual Activity   • Alcohol use: Not Currently   • Drug use: Never   • Sexual activity: Never "           Objective   Physical Exam  Vitals and nursing note reviewed.   Constitutional:       General: He is not in acute distress.     Appearance: Normal appearance. He is not ill-appearing.   HENT:      Head: Normocephalic and atraumatic.      Mouth/Throat:      Mouth: Mucous membranes are moist.   Eyes:      General: No scleral icterus.        Right eye: No discharge.         Left eye: No discharge.      Conjunctiva/sclera: Conjunctivae normal.   Cardiovascular:      Rate and Rhythm: Normal rate and regular rhythm.      Heart sounds: No murmur heard.      Pulmonary:      Effort: Pulmonary effort is normal. No respiratory distress.      Breath sounds: Normal breath sounds. No wheezing.   Abdominal:      General: Bowel sounds are normal. There is no distension.      Palpations: Abdomen is soft.      Tenderness: There is no abdominal tenderness. There is no guarding or rebound.   Musculoskeletal:         General: No swelling. Normal range of motion.      Cervical back: Normal range of motion and neck supple.   Skin:     General: Skin is warm and dry.      Findings: No rash.   Neurological:      General: No focal deficit present.      Mental Status: He is alert. Mental status is at baseline.   Psychiatric:         Mood and Affect: Mood normal.         Behavior: Behavior normal.         Thought Content: Thought content normal.         Procedures           ED Course         CT head negative.  EKG NSR.  Labs benign.  Reviewed his meds.  On 11/3 he was prescribed new gabapentin 100 TID and baclofen 20 mg QD and he has been taking those alongside his Seroquel and prazosin.  I think this combination of 4 medicines known to cause sedation/dizziness type symptoms is the source of his problem. Since he doesn't think the two new ones are helping his back I recommend he discontinue those and see if that solves his problem.    Patient stable on serial rechecks.  Discussed findings, concerns, plan of care, expected course,  reasons to return and followup.  Provided the opportunity to ask questions.                                    MDM  Number of Diagnoses or Management Options     Amount and/or Complexity of Data Reviewed  Clinical lab tests: reviewed and ordered  Tests in the radiology section of CPT®: ordered and reviewed  Decide to obtain previous medical records or to obtain history from someone other than the patient: yes  Obtain history from someone other than the patient: yes  Independent visualization of images, tracings, or specimens: yes        Final diagnoses:   Near syncope   Adverse effect of drug, initial encounter       ED Disposition  ED Disposition     ED Disposition Condition Comment    Discharge Stable           Pastora Carbajal, APRN  [address] [address]  [phone]    In 1 week           Medication List      Stop    baclofen 20 MG tablet  Commonly known as: LIORESAL     gabapentin 300 MG capsule  Commonly known as: Shukri Schroeder MD  11/11/21 2008

## 2021-12-06 PROCEDURE — U0004 COV-19 TEST NON-CDC HGH THRU: HCPCS | Performed by: NURSE PRACTITIONER

## 2022-01-25 PROCEDURE — U0004 COV-19 TEST NON-CDC HGH THRU: HCPCS | Performed by: FAMILY MEDICINE

## 2022-04-18 ENCOUNTER — HOSPITAL ENCOUNTER (EMERGENCY)
Facility: HOSPITAL | Age: 42
Discharge: HOME | End: 2022-04-18
Attending: EMERGENCY MEDICINE | Admitting: EMERGENCY MEDICINE

## 2022-04-18 ENCOUNTER — APPOINTMENT (OUTPATIENT)
Dept: GENERAL RADIOLOGY | Facility: HOSPITAL | Age: 42
End: 2022-04-18

## 2022-04-18 VITALS
WEIGHT: 235 LBS | HEIGHT: 66 IN | SYSTOLIC BLOOD PRESSURE: 123 MMHG | DIASTOLIC BLOOD PRESSURE: 86 MMHG | HEART RATE: 69 BPM | OXYGEN SATURATION: 95 % | RESPIRATION RATE: 12 BRPM | BODY MASS INDEX: 37.77 KG/M2 | TEMPERATURE: 97.8 F

## 2022-04-18 DIAGNOSIS — S29.011A MUSCLE STRAIN OF CHEST WALL, INITIAL ENCOUNTER: ICD-10-CM

## 2022-04-18 DIAGNOSIS — R07.2 PRECORDIAL CHEST PAIN: Primary | ICD-10-CM

## 2022-04-18 LAB
ALBUMIN SERPL-MCNC: 4.2 G/DL (ref 3.5–5.2)
ALBUMIN/GLOB SERPL: 1.3 G/DL
ALP SERPL-CCNC: 46 U/L (ref 39–117)
ALT SERPL W P-5'-P-CCNC: 14 U/L (ref 1–41)
ANION GAP SERPL CALCULATED.3IONS-SCNC: 8 MMOL/L (ref 5–15)
AST SERPL-CCNC: 16 U/L (ref 1–40)
BASOPHILS # BLD AUTO: 0.04 10*3/MM3 (ref 0–0.2)
BASOPHILS NFR BLD AUTO: 0.4 % (ref 0–1.5)
BILIRUB SERPL-MCNC: 0.2 MG/DL (ref 0–1.2)
BUN SERPL-MCNC: 12 MG/DL (ref 6–20)
BUN/CREAT SERPL: 8.4 (ref 7–25)
CALCIUM SPEC-SCNC: 9.2 MG/DL (ref 8.6–10.5)
CHLORIDE SERPL-SCNC: 102 MMOL/L (ref 98–107)
CO2 SERPL-SCNC: 32 MMOL/L (ref 22–29)
CREAT SERPL-MCNC: 1.43 MG/DL (ref 0.76–1.27)
DEPRECATED RDW RBC AUTO: 49.6 FL (ref 37–54)
EGFRCR SERPLBLD CKD-EPI 2021: 63.1 ML/MIN/1.73
EOSINOPHIL # BLD AUTO: 0.03 10*3/MM3 (ref 0–0.4)
EOSINOPHIL NFR BLD AUTO: 0.3 % (ref 0.3–6.2)
ERYTHROCYTE [DISTWIDTH] IN BLOOD BY AUTOMATED COUNT: 15 % (ref 12.3–15.4)
GLOBULIN UR ELPH-MCNC: 3.2 GM/DL
GLUCOSE BLDC GLUCOMTR-MCNC: 90 MG/DL (ref 70–130)
GLUCOSE SERPL-MCNC: 84 MG/DL (ref 65–99)
HCT VFR BLD AUTO: 46.6 % (ref 37.5–51)
HGB BLD-MCNC: 15.3 G/DL (ref 13–17.7)
HOLD SPECIMEN: NORMAL
IMM GRANULOCYTES # BLD AUTO: 0.05 10*3/MM3 (ref 0–0.05)
IMM GRANULOCYTES NFR BLD AUTO: 0.5 % (ref 0–0.5)
LIPASE SERPL-CCNC: 61 U/L (ref 13–60)
LYMPHOCYTES # BLD AUTO: 4.28 10*3/MM3 (ref 0.7–3.1)
LYMPHOCYTES NFR BLD AUTO: 42.8 % (ref 19.6–45.3)
MCH RBC QN AUTO: 29.8 PG (ref 26.6–33)
MCHC RBC AUTO-ENTMCNC: 32.8 G/DL (ref 31.5–35.7)
MCV RBC AUTO: 90.8 FL (ref 79–97)
MONOCYTES # BLD AUTO: 0.83 10*3/MM3 (ref 0.1–0.9)
MONOCYTES NFR BLD AUTO: 8.3 % (ref 5–12)
NEUTROPHILS NFR BLD AUTO: 4.77 10*3/MM3 (ref 1.7–7)
NEUTROPHILS NFR BLD AUTO: 47.7 % (ref 42.7–76)
NRBC BLD AUTO-RTO: 0 /100 WBC (ref 0–0.2)
NT-PROBNP SERPL-MCNC: <5 PG/ML (ref 0–450)
PLATELET # BLD AUTO: 201 10*3/MM3 (ref 140–450)
PMV BLD AUTO: 13.3 FL (ref 6–12)
POTASSIUM SERPL-SCNC: 3.9 MMOL/L (ref 3.5–5.2)
PROT SERPL-MCNC: 7.4 G/DL (ref 6–8.5)
QT INTERVAL: 416 MS
QT INTERVAL: 428 MS
QTC INTERVAL: 428 MS
QTC INTERVAL: 432 MS
RBC # BLD AUTO: 5.13 10*6/MM3 (ref 4.14–5.8)
SODIUM SERPL-SCNC: 142 MMOL/L (ref 136–145)
TROPONIN T SERPL-MCNC: <0.01 NG/ML (ref 0–0.03)
TROPONIN T SERPL-MCNC: <0.01 NG/ML (ref 0–0.03)
WBC NRBC COR # BLD: 10 10*3/MM3 (ref 3.4–10.8)
WHOLE BLOOD HOLD SPECIMEN: NORMAL
WHOLE BLOOD HOLD SPECIMEN: NORMAL

## 2022-04-18 PROCEDURE — 83690 ASSAY OF LIPASE: CPT | Performed by: EMERGENCY MEDICINE

## 2022-04-18 PROCEDURE — 83880 ASSAY OF NATRIURETIC PEPTIDE: CPT | Performed by: EMERGENCY MEDICINE

## 2022-04-18 PROCEDURE — 80053 COMPREHEN METABOLIC PANEL: CPT | Performed by: EMERGENCY MEDICINE

## 2022-04-18 PROCEDURE — 93005 ELECTROCARDIOGRAM TRACING: CPT | Performed by: EMERGENCY MEDICINE

## 2022-04-18 PROCEDURE — 71045 X-RAY EXAM CHEST 1 VIEW: CPT

## 2022-04-18 PROCEDURE — 36415 COLL VENOUS BLD VENIPUNCTURE: CPT

## 2022-04-18 PROCEDURE — 96374 THER/PROPH/DIAG INJ IV PUSH: CPT

## 2022-04-18 PROCEDURE — 84484 ASSAY OF TROPONIN QUANT: CPT | Performed by: EMERGENCY MEDICINE

## 2022-04-18 PROCEDURE — 82962 GLUCOSE BLOOD TEST: CPT

## 2022-04-18 PROCEDURE — 85025 COMPLETE CBC W/AUTO DIFF WBC: CPT | Performed by: EMERGENCY MEDICINE

## 2022-04-18 PROCEDURE — 25010000002 KETOROLAC TROMETHAMINE PER 15 MG: Performed by: EMERGENCY MEDICINE

## 2022-04-18 PROCEDURE — 99284 EMERGENCY DEPT VISIT MOD MDM: CPT

## 2022-04-18 RX ORDER — CYCLOBENZAPRINE HCL 10 MG
10 TABLET ORAL ONCE
Status: COMPLETED | OUTPATIENT
Start: 2022-04-18 | End: 2022-04-18

## 2022-04-18 RX ORDER — ASPIRIN 81 MG/1
324 TABLET, CHEWABLE ORAL ONCE
Status: COMPLETED | OUTPATIENT
Start: 2022-04-18 | End: 2022-04-18

## 2022-04-18 RX ORDER — ALUMINA, MAGNESIA, AND SIMETHICONE 2400; 2400; 240 MG/30ML; MG/30ML; MG/30ML
15 SUSPENSION ORAL ONCE
Status: COMPLETED | OUTPATIENT
Start: 2022-04-18 | End: 2022-04-18

## 2022-04-18 RX ORDER — CYCLOBENZAPRINE HCL 10 MG
10 TABLET ORAL 3 TIMES DAILY PRN
Qty: 15 TABLET | Refills: 0 | OUTPATIENT
Start: 2022-04-18 | End: 2023-03-17

## 2022-04-18 RX ORDER — SODIUM CHLORIDE 0.9 % (FLUSH) 0.9 %
10 SYRINGE (ML) INJECTION AS NEEDED
Status: DISCONTINUED | OUTPATIENT
Start: 2022-04-18 | End: 2022-04-18 | Stop reason: HOSPADM

## 2022-04-18 RX ORDER — LIDOCAINE HYDROCHLORIDE 20 MG/ML
15 SOLUTION OROPHARYNGEAL ONCE
Status: COMPLETED | OUTPATIENT
Start: 2022-04-18 | End: 2022-04-18

## 2022-04-18 RX ORDER — KETOROLAC TROMETHAMINE 15 MG/ML
15 INJECTION, SOLUTION INTRAMUSCULAR; INTRAVENOUS ONCE
Status: COMPLETED | OUTPATIENT
Start: 2022-04-18 | End: 2022-04-18

## 2022-04-18 RX ADMIN — LIDOCAINE HYDROCHLORIDE 15 ML: 20 SOLUTION ORAL; TOPICAL at 17:15

## 2022-04-18 RX ADMIN — CYCLOBENZAPRINE 10 MG: 10 TABLET, FILM COATED ORAL at 19:50

## 2022-04-18 RX ADMIN — KETOROLAC TROMETHAMINE 15 MG: 15 INJECTION, SOLUTION INTRAMUSCULAR; INTRAVENOUS at 17:44

## 2022-04-18 RX ADMIN — ASPIRIN 81 MG 324 MG: 81 TABLET ORAL at 17:15

## 2022-04-18 RX ADMIN — ALUMINUM HYDROXIDE, MAGNESIUM HYDROXIDE, AND DIMETHICONE 15 ML: 400; 400; 40 SUSPENSION ORAL at 17:15

## 2022-04-18 NOTE — ED PROVIDER NOTES
EMERGENCY DEPARTMENT ENCOUNTER    Pt Name: Olivier Swift  MRN: [id number]  Pt :   1980  Room Number:    Date of encounter:  2022  PCP: Pastora Carbajal APRN  ED Provider: Olivier Barrow MD    Historian: Patient      HPI:  Chief Complaint: Chest discomfort        Context: Olivier Swift is a 42 y.o. male who presents to the ED c/o chest discomfort starting around 3 AM.  The patient was sleeping when he woke having tightness in his chest.  He sat up and continued to have the discomfort.  He notes an increase in discomfort if he rotates his thorax to the left or to the right.  The discomfort has been continuous since onset at 3 AM.  He reports no waxing and waning of his symptoms.  He rates his pain 10 out of 10 on the pain scale.  He has not taken any medications for symptom relief.  He ate dinner at 7 PM and does not feel that it was overly large.  He has had no prior problems with reflux that he is aware of.  The patient has seen , cardiology and has been placed on 3 different blood pressure medications by him.  He does suffer from hypertension, hypercholesterolemia, diabetes.  No first-degree family members with coronary artery disease.  No recreational drug use or tobacco use.  The patient reports he felt his blood sugar was low last evening and lost his balance, falling to the floor.  He did not have loss of consciousness.  He did not noticed that he harmed himself in the fall.  Patient had a negative stress test roughly 6 months ago.        PAST MEDICAL HISTORY  Past Medical History:   Diagnosis Date   • Anxiety    • Bipolar depression (HCC)    • Depression    • Diabetes mellitus (HCC)    • Hypertension    • Leaky heart valve    • Sleep apnea          PAST SURGICAL HISTORY  History reviewed. No pertinent surgical history.      FAMILY HISTORY  Family History   Problem Relation Age of Onset   • Diabetes Maternal Grandmother          SOCIAL HISTORY  Social History     Socioeconomic  History   • Marital status:    Tobacco Use   • Smoking status: Current Some Day Smoker     Types: Cigars   • Smokeless tobacco: Never Used   Vaping Use   • Vaping Use: Never used   Substance and Sexual Activity   • Alcohol use: Not Currently   • Drug use: Never   • Sexual activity: Never         ALLERGIES  Trazodone        REVIEW OF SYSTEMS  Review of Systems       All systems reviewed and negative except for those discussed in HPI.       PHYSICAL EXAM    I have reviewed the triage vital signs and nursing notes.    ED Triage Vitals [04/18/22 1558]   Temp Heart Rate Resp BP SpO2   97.8 °F (36.6 °C) 69 16 134/96 --      Temp src Heart Rate Source Patient Position BP Location FiO2 (%)   Oral Monitor Sitting Left arm --       Physical Exam  GENERAL:   Appears in no acute distress.  I evaluate him in our hallway because there are no beds available within the emergency department secondary to patient volume.  HENT: Nares patent.  EYES: No scleral icterus.  CV: Regular rhythm, regular rate.  No murmurs gallops rubs clear to auscultation  RESPIRATORY: Normal effort.  No audible wheezes, rales or rhonchi.  ABDOMEN: Soft, nontender to deep palpation including in the epigastrium.  MUSCULOSKELETAL: No deformities.  Very reproducible anterior chest wall tenderness to palpation which exactly reproduces the patient's complaint.  NEURO: Alert, moves all extremities, follows commands.  SKIN: Warm, dry, no rash visualized.        LAB RESULTS  Recent Results (from the past 24 hour(s))   ECG 12 Lead    Collection Time: 04/18/22  4:00 PM   Result Value Ref Range    QT Interval 416 ms    QTC Interval 432 ms   Troponin    Collection Time: 04/18/22  4:04 PM    Specimen: Blood   Result Value Ref Range    Troponin T <0.010 0.000 - 0.030 ng/mL   Comprehensive Metabolic Panel    Collection Time: 04/18/22  4:04 PM    Specimen: Blood   Result Value Ref Range    Glucose 84 65 - 99 mg/dL    BUN 12 6 - 20 mg/dL    Creatinine 1.43 (H) 0.76 -  1.27 mg/dL    Sodium 142 136 - 145 mmol/L    Potassium 3.9 3.5 - 5.2 mmol/L    Chloride 102 98 - 107 mmol/L    CO2 32.0 (H) 22.0 - 29.0 mmol/L    Calcium 9.2 8.6 - 10.5 mg/dL    Total Protein 7.4 6.0 - 8.5 g/dL    Albumin 4.20 3.50 - 5.20 g/dL    ALT (SGPT) 14 1 - 41 U/L    AST (SGOT) 16 1 - 40 U/L    Alkaline Phosphatase 46 39 - 117 U/L    Total Bilirubin 0.2 0.0 - 1.2 mg/dL    Globulin 3.2 gm/dL    A/G Ratio 1.3 g/dL    BUN/Creatinine Ratio 8.4 7.0 - 25.0    Anion Gap 8.0 5.0 - 15.0 mmol/L    eGFR 63.1 >60.0 mL/min/1.73   Lipase    Collection Time: 04/18/22  4:04 PM    Specimen: Blood   Result Value Ref Range    Lipase 61 (H) 13 - 60 U/L   BNP    Collection Time: 04/18/22  4:04 PM    Specimen: Blood   Result Value Ref Range    proBNP <5.0 0.0 - 450.0 pg/mL   Green Top (Gel)    Collection Time: 04/18/22  4:04 PM   Result Value Ref Range    Extra Tube Hold for add-ons.    Lavender Top    Collection Time: 04/18/22  4:04 PM   Result Value Ref Range    Extra Tube hold for add-on    Gold Top - SST    Collection Time: 04/18/22  4:04 PM   Result Value Ref Range    Extra Tube Hold for add-ons.    Light Blue Top    Collection Time: 04/18/22  4:04 PM   Result Value Ref Range    Extra Tube hold for add-on    CBC Auto Differential    Collection Time: 04/18/22  4:04 PM    Specimen: Blood   Result Value Ref Range    WBC 10.00 3.40 - 10.80 10*3/mm3    RBC 5.13 4.14 - 5.80 10*6/mm3    Hemoglobin 15.3 13.0 - 17.7 g/dL    Hematocrit 46.6 37.5 - 51.0 %    MCV 90.8 79.0 - 97.0 fL    MCH 29.8 26.6 - 33.0 pg    MCHC 32.8 31.5 - 35.7 g/dL    RDW 15.0 12.3 - 15.4 %    RDW-SD 49.6 37.0 - 54.0 fl    MPV 13.3 (H) 6.0 - 12.0 fL    Platelets 201 140 - 450 10*3/mm3    Neutrophil % 47.7 42.7 - 76.0 %    Lymphocyte % 42.8 19.6 - 45.3 %    Monocyte % 8.3 5.0 - 12.0 %    Eosinophil % 0.3 0.3 - 6.2 %    Basophil % 0.4 0.0 - 1.5 %    Immature Grans % 0.5 0.0 - 0.5 %    Neutrophils, Absolute 4.77 1.70 - 7.00 10*3/mm3    Lymphocytes, Absolute 4.28 (H)  0.70 - 3.10 10*3/mm3    Monocytes, Absolute 0.83 0.10 - 0.90 10*3/mm3    Eosinophils, Absolute 0.03 0.00 - 0.40 10*3/mm3    Basophils, Absolute 0.04 0.00 - 0.20 10*3/mm3    Immature Grans, Absolute 0.05 0.00 - 0.05 10*3/mm3    nRBC 0.0 0.0 - 0.2 /100 WBC   POC Glucose Once    Collection Time: 04/18/22  5:09 PM    Specimen: Blood   Result Value Ref Range    Glucose 90 70 - 130 mg/dL   ECG 12 Lead    Collection Time: 04/18/22  6:13 PM   Result Value Ref Range    QT Interval 428 ms    QTC Interval 428 ms   Troponin    Collection Time: 04/18/22  6:18 PM    Specimen: Blood   Result Value Ref Range    Troponin T <0.010 0.000 - 0.030 ng/mL       If labs were ordered, I independently reviewed the results.        RADIOLOGY  XR Chest 1 View    Result Date: 4/18/2022  DATE OF EXAM: 4/18/2022 4:15 PM  PROCEDURE: XR CHEST 1 VW-  INDICATIONS: Chest pain.  COMPARISON: 11/11/2021.  TECHNIQUE: Single radiographic view of the chest was obtained.  FINDINGS: The heart size is normal. The pulmonary vascular markings are normal. The lungs and pleural spaces are clear of active disease.  The bony thorax is normal for age.      No active disease.  This report was finalized on 4/18/2022 4:20 PM by Jagdeep Staples MD.        I ordered and reviewed the above noted radiographic studies.      I viewed images of chest x-ray which showed no active disease per my independent interpretation.    See radiologist's dictation for official interpretation.        PROCEDURES    Procedures    ECG 12 Lead   Final Result   Test Reason : chest pain   Blood Pressure :   */*   mmHG   Vent. Rate :  60 BPM     Atrial Rate :  60 BPM      P-R Int : 132 ms          QRS Dur :  92 ms       QT Int : 428 ms       P-R-T Axes :  38   8  14 degrees      QTc Int : 428 ms      Normal sinus rhythm   Nonspecific T wave abnormality   Abnormal ECG   When compared with ECG of 18-APR-2022 16:00, (Unconfirmed)   No significant change was found   Confirmed by SHAHAB GARZA MD ([id number]) on  4/18/2022 7:19:28 PM      Referred By: EDMD           Confirmed By: SHAHAB GARZA MD      ECG 12 Lead   Final Result   Test Reason : chest pain   Blood Pressure :   */*   mmHG   Vent. Rate :  65 BPM     Atrial Rate :  65 BPM      P-R Int : 104 ms          QRS Dur :  88 ms       QT Int : 416 ms       P-R-T Axes :   6  -1   0 degrees      QTc Int : 432 ms      Sinus rhythm with short MS   Otherwise normal ECG   When compared with ECG of 11-NOV-2021 11:46,   No significant change was found   Confirmed by SHAHAB GARZA MD ([id number]) on 4/18/2022 7:16:47 PM      Referred By:            Confirmed By: SHAHAB GARZA MD          MEDICATIONS GIVEN IN ER    Medications   sodium chloride 0.9 % flush 10 mL (has no administration in time range)   aspirin chewable tablet 324 mg (324 mg Oral Given 4/18/22 1715)   aluminum-magnesium hydroxide-simethicone (MAALOX MAX) 400-400-40 MG/5ML suspension 15 mL (15 mL Oral Given 4/18/22 1715)   Lidocaine Viscous HCl (XYLOCAINE) 2 % solution 15 mL (15 mL Mouth/Throat Given 4/18/22 1715)   ketorolac (TORADOL) injection 15 mg (15 mg Intravenous Given 4/18/22 1744)   cyclobenzaprine (FLEXERIL) tablet 10 mg (10 mg Oral Given 4/18/22 1950)         PROGRESS, DATA ANALYSIS, CONSULTS, AND MEDICAL DECISION MAKING    All labs have been independently reviewed by me.  All radiology studies have been reviewed by me and the radiologist dictating the report.   EKG's have been independently viewed and interpreted by me.      Differential diagnoses: Chest wall strain versus ACS versus PE, GERD, etc.      ED Course as of 04/18/22 1950 Mon Apr 18, 2022 1741 Given potential gastric component we administered a GI cocktail.  The patient did not get relief from this.  Troponin is negative and EKG is not ischemic appearing.  Patient's discomfort has been continuous since 3 AM and therefore acute coronary syndrome less likely.  I have ordered Toradol. [MS]   1945 Patient had mild relief of symptoms with Toradol.  I  spoke with him in detail about discharge instructions. [MS]   1946 I have ordered a dose of Flexeril. [MS]      ED Course User Index  [MS] Olivier Barrow MD             AS OF 19:50 EDT VITALS:    BP - 123/86  HR - 69  TEMP - 97.8 °F (36.6 °C) (Oral)  O2 SATS - 95%                  DIAGNOSIS  Final diagnoses:   Precordial chest pain   Muscle strain of chest wall, initial encounter         DISPOSITION  DISCHARGE    Patient discharged in stable condition.    Reviewed implications of results, diagnosis, meds, responsibility to follow up, warning signs and symptoms of possible worsening, potential complications and reasons to return to ER.    Patient/Family voiced understanding of above instructions.    Discussed plan for discharge, as there is no emergent indication for admission.  Pt/family is agreeable and understands need for follow up and possible repeat testing.  Pt/family is aware that discharge does not mean that nothing is wrong but that it indicates no emergency is currently present that requires admission and they must continue care with follow-up as given below or with a physician of their choice.     FOLLOW-UP  [facility]  [address]  [address]  [facility] [id number] [phone]    NEXT AVAILABLE APPOINTMENT - RECHECK OF CONDITION    Pastora Carbajal, APRN  [address]  [address]  [place] [id number] [phone]          [facility] Emergency Department  [address] [facility] [address] [phone]    IF YOU HAVE ANY CONCERN OF WORSENING CONDITION         Medication List      New Prescriptions    cyclobenzaprine 10 MG tablet  Commonly known as: FLEXERIL  Take 1 tablet by mouth 3 (Three) Times a Day As Needed for Muscle Spasms.        Stop    methocarbamol 750 MG tablet  Commonly known as: ROBAXIN           Where to Get Your Medications      These medications were sent to [facility] [address] [place], [place] [address]  [facility] [phone] [facility] [phone]   [address] [address]    Phone: [phone]   · cyclobenzaprine 10 MG tablet                  Olivier Barrow MD  04/18/22 1950

## 2022-04-18 NOTE — DISCHARGE INSTRUCTIONS
I have referred you to the heart valve clinic for further cardiac testing.    Take Flexeril as needed.  This is a muscle relaxant.  If you still have a prescription for methocarbamol/Robaxin do not take this medication while taking the Flexeril.    Given concern for worsening condition please return to the emergency department.

## 2022-07-01 ENCOUNTER — APPOINTMENT (OUTPATIENT)
Dept: GENERAL RADIOLOGY | Facility: HOSPITAL | Age: 42
End: 2022-07-01

## 2022-07-01 ENCOUNTER — HOSPITAL ENCOUNTER (EMERGENCY)
Facility: HOSPITAL | Age: 42
Discharge: HOME | End: 2022-07-01
Attending: EMERGENCY MEDICINE | Admitting: EMERGENCY MEDICINE

## 2022-07-01 ENCOUNTER — APPOINTMENT (OUTPATIENT)
Dept: CT IMAGING | Facility: HOSPITAL | Age: 42
End: 2022-07-01

## 2022-07-01 VITALS
TEMPERATURE: 97.2 F | DIASTOLIC BLOOD PRESSURE: 79 MMHG | SYSTOLIC BLOOD PRESSURE: 93 MMHG | BODY MASS INDEX: 38.32 KG/M2 | HEIGHT: 65 IN | HEART RATE: 73 BPM | OXYGEN SATURATION: 95 % | RESPIRATION RATE: 18 BRPM | WEIGHT: 230 LBS

## 2022-07-01 DIAGNOSIS — L50.9 URTICARIA: ICD-10-CM

## 2022-07-01 DIAGNOSIS — K52.9 ENTERITIS: Primary | ICD-10-CM

## 2022-07-01 DIAGNOSIS — R55 NEAR SYNCOPE: ICD-10-CM

## 2022-07-01 DIAGNOSIS — K40.90 INGUINAL HERNIA, RIGHT: ICD-10-CM

## 2022-07-01 LAB
ALBUMIN SERPL-MCNC: 4 G/DL (ref 3.5–5.2)
ALBUMIN/GLOB SERPL: 1.2 G/DL
ALP SERPL-CCNC: 42 U/L (ref 39–117)
ALT SERPL W P-5'-P-CCNC: 15 U/L (ref 1–41)
ANION GAP SERPL CALCULATED.3IONS-SCNC: 9 MMOL/L (ref 5–15)
AST SERPL-CCNC: 18 U/L (ref 1–40)
BASOPHILS # BLD AUTO: 0.03 10*3/MM3 (ref 0–0.2)
BASOPHILS NFR BLD AUTO: 0.2 % (ref 0–1.5)
BILIRUB SERPL-MCNC: 0.2 MG/DL (ref 0–1.2)
BILIRUB UR QL STRIP: NEGATIVE
BUN SERPL-MCNC: 16 MG/DL (ref 6–20)
BUN/CREAT SERPL: 11.9 (ref 7–25)
CALCIUM SPEC-SCNC: 9.1 MG/DL (ref 8.6–10.5)
CHLORIDE SERPL-SCNC: 105 MMOL/L (ref 98–107)
CLARITY UR: CLEAR
CO2 SERPL-SCNC: 29 MMOL/L (ref 22–29)
COLOR UR: YELLOW
CREAT SERPL-MCNC: 1.35 MG/DL (ref 0.76–1.27)
DEPRECATED RDW RBC AUTO: 46.3 FL (ref 37–54)
EGFRCR SERPLBLD CKD-EPI 2021: 67.6 ML/MIN/1.73
EOSINOPHIL # BLD AUTO: 0 10*3/MM3 (ref 0–0.4)
EOSINOPHIL NFR BLD AUTO: 0 % (ref 0.3–6.2)
ERYTHROCYTE [DISTWIDTH] IN BLOOD BY AUTOMATED COUNT: 14.5 % (ref 12.3–15.4)
GLOBULIN UR ELPH-MCNC: 3.3 GM/DL
GLUCOSE SERPL-MCNC: 143 MG/DL (ref 65–99)
GLUCOSE UR STRIP-MCNC: NEGATIVE MG/DL
HBA1C MFR BLD: 5.6 % (ref 4.8–5.6)
HCT VFR BLD AUTO: 52.8 % (ref 37.5–51)
HGB BLD-MCNC: 17.6 G/DL (ref 13–17.7)
HGB UR QL STRIP.AUTO: NEGATIVE
HOLD SPECIMEN: NORMAL
IMM GRANULOCYTES # BLD AUTO: 0.04 10*3/MM3 (ref 0–0.05)
IMM GRANULOCYTES NFR BLD AUTO: 0.3 % (ref 0–0.5)
KETONES UR QL STRIP: NEGATIVE
LEUKOCYTE ESTERASE UR QL STRIP.AUTO: NEGATIVE
LYMPHOCYTES # BLD AUTO: 4.15 10*3/MM3 (ref 0.7–3.1)
LYMPHOCYTES NFR BLD AUTO: 28.8 % (ref 19.6–45.3)
MAGNESIUM SERPL-MCNC: 2.1 MG/DL (ref 1.6–2.6)
MCH RBC QN AUTO: 29.6 PG (ref 26.6–33)
MCHC RBC AUTO-ENTMCNC: 33.3 G/DL (ref 31.5–35.7)
MCV RBC AUTO: 88.7 FL (ref 79–97)
MONOCYTES # BLD AUTO: 0.81 10*3/MM3 (ref 0.1–0.9)
MONOCYTES NFR BLD AUTO: 5.6 % (ref 5–12)
NEUTROPHILS NFR BLD AUTO: 65.1 % (ref 42.7–76)
NEUTROPHILS NFR BLD AUTO: 9.39 10*3/MM3 (ref 1.7–7)
NITRITE UR QL STRIP: NEGATIVE
NRBC BLD AUTO-RTO: 0 /100 WBC (ref 0–0.2)
PH UR STRIP.AUTO: <=5 [PH] (ref 5–8)
PLATELET # BLD AUTO: 248 10*3/MM3 (ref 140–450)
PMV BLD AUTO: 13.5 FL (ref 6–12)
POTASSIUM SERPL-SCNC: 4.4 MMOL/L (ref 3.5–5.2)
PROT SERPL-MCNC: 7.3 G/DL (ref 6–8.5)
PROT UR QL STRIP: NEGATIVE
QT INTERVAL: 340 MS
QT INTERVAL: 412 MS
QTC INTERVAL: 435 MS
QTC INTERVAL: 436 MS
RBC # BLD AUTO: 5.95 10*6/MM3 (ref 4.14–5.8)
SODIUM SERPL-SCNC: 143 MMOL/L (ref 136–145)
SP GR UR STRIP: 1.02 (ref 1–1.03)
TROPONIN T SERPL-MCNC: <0.01 NG/ML (ref 0–0.03)
TROPONIN T SERPL-MCNC: <0.01 NG/ML (ref 0–0.03)
UROBILINOGEN UR QL STRIP: NORMAL
VALPROATE SERPL-MCNC: 3.8 MCG/ML (ref 50–125)
WBC NRBC COR # BLD: 14.42 10*3/MM3 (ref 3.4–10.8)
WHOLE BLOOD HOLD COAG: NORMAL
WHOLE BLOOD HOLD SPECIMEN: NORMAL

## 2022-07-01 PROCEDURE — 99284 EMERGENCY DEPT VISIT MOD MDM: CPT

## 2022-07-01 PROCEDURE — 36415 COLL VENOUS BLD VENIPUNCTURE: CPT

## 2022-07-01 PROCEDURE — 70450 CT HEAD/BRAIN W/O DYE: CPT

## 2022-07-01 PROCEDURE — 96375 TX/PRO/DX INJ NEW DRUG ADDON: CPT

## 2022-07-01 PROCEDURE — 74176 CT ABD & PELVIS W/O CONTRAST: CPT

## 2022-07-01 PROCEDURE — 96374 THER/PROPH/DIAG INJ IV PUSH: CPT

## 2022-07-01 PROCEDURE — 80164 ASSAY DIPROPYLACETIC ACD TOT: CPT | Performed by: NURSE PRACTITIONER

## 2022-07-01 PROCEDURE — 81003 URINALYSIS AUTO W/O SCOPE: CPT | Performed by: EMERGENCY MEDICINE

## 2022-07-01 PROCEDURE — 25010000002 PROCHLORPERAZINE 10 MG/2ML SOLUTION: Performed by: NURSE PRACTITIONER

## 2022-07-01 PROCEDURE — 83735 ASSAY OF MAGNESIUM: CPT | Performed by: EMERGENCY MEDICINE

## 2022-07-01 PROCEDURE — 80053 COMPREHEN METABOLIC PANEL: CPT | Performed by: EMERGENCY MEDICINE

## 2022-07-01 PROCEDURE — 93005 ELECTROCARDIOGRAM TRACING: CPT | Performed by: NURSE PRACTITIONER

## 2022-07-01 PROCEDURE — 71045 X-RAY EXAM CHEST 1 VIEW: CPT

## 2022-07-01 PROCEDURE — 84484 ASSAY OF TROPONIN QUANT: CPT | Performed by: EMERGENCY MEDICINE

## 2022-07-01 PROCEDURE — 83036 HEMOGLOBIN GLYCOSYLATED A1C: CPT | Performed by: NURSE PRACTITIONER

## 2022-07-01 PROCEDURE — 93005 ELECTROCARDIOGRAM TRACING: CPT | Performed by: EMERGENCY MEDICINE

## 2022-07-01 PROCEDURE — 84484 ASSAY OF TROPONIN QUANT: CPT | Performed by: NURSE PRACTITIONER

## 2022-07-01 PROCEDURE — 85025 COMPLETE CBC W/AUTO DIFF WBC: CPT

## 2022-07-01 RX ORDER — PROCHLORPERAZINE EDISYLATE 5 MG/ML
10 INJECTION INTRAMUSCULAR; INTRAVENOUS ONCE
Status: COMPLETED | OUTPATIENT
Start: 2022-07-01 | End: 2022-07-01

## 2022-07-01 RX ORDER — SODIUM CHLORIDE 0.9 % (FLUSH) 0.9 %
10 SYRINGE (ML) INJECTION AS NEEDED
Status: DISCONTINUED | OUTPATIENT
Start: 2022-07-01 | End: 2022-07-01 | Stop reason: HOSPADM

## 2022-07-01 RX ORDER — MECLIZINE HYDROCHLORIDE 25 MG/1
25 TABLET ORAL ONCE
Status: COMPLETED | OUTPATIENT
Start: 2022-07-01 | End: 2022-07-01

## 2022-07-01 RX ORDER — MECLIZINE HYDROCHLORIDE 25 MG/1
25 TABLET ORAL 3 TIMES DAILY PRN
Qty: 12 TABLET | Refills: 0 | Status: SHIPPED | OUTPATIENT
Start: 2022-07-01

## 2022-07-01 RX ADMIN — MECLIZINE HYDROCHLORIDE 25 MG: 25 TABLET ORAL at 15:18

## 2022-07-01 RX ADMIN — PROCHLORPERAZINE EDISYLATE 10 MG: 5 INJECTION INTRAMUSCULAR; INTRAVENOUS at 15:18

## 2022-07-01 RX ADMIN — SODIUM CHLORIDE 1000 ML: 9 INJECTION, SOLUTION INTRAVENOUS at 15:17

## 2022-07-01 NOTE — ED PROVIDER NOTES
"Subjective   Pleasant patient presents the ER for episode of feeling lightheaded while he was shaving his head.  This occurred shortly after taking his insulin.  He had also had some salmon this morning and some [employer] last night.  1 episode of vomiting. No cp. No soa. No palpitations. Had a slight diffuse rash as well that wife gave benadryl for and resolved.      Dizziness  Quality:  Lightheadedness  Severity:  Moderate  Onset quality:  Sudden  Duration: several minutes.  Timing:  Constant  Progression:  Improving  Chronicity:  New  Relieved by:  Nothing  Worsened by:  Nothing  Associated symptoms: no blood in stool, no chest pain, no diarrhea, no nausea, no shortness of breath and no vomiting        Review of Systems   Constitutional: Negative for chills, diaphoresis and fever.   HENT: Negative for congestion and sore throat.    Respiratory: Negative for cough, choking, chest tightness, shortness of breath and wheezing.    Cardiovascular: Negative for chest pain and leg swelling.   Gastrointestinal: Negative for abdominal distention, abdominal pain, anal bleeding, blood in stool, constipation, diarrhea, nausea and vomiting.   Genitourinary: Negative for difficulty urinating, dysuria, flank pain, frequency, hematuria and urgency.   Neurological: Positive for dizziness.   All other systems reviewed and are negative.      Past Medical History:   Diagnosis Date   • Anxiety    • Bipolar depression (HCC)    • Depression    • Diabetes mellitus (HCC)    • Hypertension    • Leaky heart valve    • Sleep apnea        Allergies   Allergen Reactions   • Trazodone Other (See Comments)     \" CAUSED ME TO HAVE AN ERECTION\"        History reviewed. No pertinent surgical history.    Family History   Problem Relation Age of Onset   • Diabetes Maternal Grandmother        Social History     Socioeconomic History   • Marital status:    Tobacco Use   • Smoking status: Current Some Day Smoker     Types: Cigars   • Smokeless " tobacco: Never Used   Vaping Use   • Vaping Use: Never used   Substance and Sexual Activity   • Alcohol use: Not Currently   • Drug use: Never   • Sexual activity: Never           Objective   Physical Exam  Constitutional:       Appearance: He is well-developed.   HENT:      Head: Normocephalic and atraumatic.      Right Ear: External ear normal.      Left Ear: External ear normal.      Nose: Nose normal.   Eyes:      Conjunctiva/sclera: Conjunctivae normal.      Pupils: Pupils are equal, round, and reactive to light.   Cardiovascular:      Rate and Rhythm: Normal rate and regular rhythm.      Heart sounds: Normal heart sounds.   Pulmonary:      Effort: Pulmonary effort is normal.      Breath sounds: Normal breath sounds.   Abdominal:      General: Bowel sounds are normal.      Palpations: Abdomen is soft.      Hernia: A hernia (Soft reducible inguinal hernia.  No erythema.) is present.   Musculoskeletal:         General: Normal range of motion.      Cervical back: Normal range of motion and neck supple.   Skin:     General: Skin is warm and dry.   Neurological:      Mental Status: He is alert and oriented to person, place, and time.   Psychiatric:         Behavior: Behavior normal.         Judgment: Judgment normal.         Procedures           ED Course  ED Course as of 07/01/22 1739   Fri Jul 01, 2022   1601 Reassuring work-up so far.  Awaiting his second set.  Patient continues to feel better with reassuring lab work plan on discharging the patient.  Most likely make a follow-up to her syncope clinic. [JM]   1704 Patient resting comfortably.  Reassuring repeat troponin and EKG.  Reassuring CTs.  Symptoms could be related to a vagal episode related to the [employer] in the salmon while cutting his hair along with hypoglycemia after taking his insulin.  Groin was evaluated.  Small reducible inguinal hernias.  No signs of strangulation or incarceration.  No erythema.  I will give patient a follow-up to our syncope  clinic.  He is thankful and agreeable.  Well aware the signs of the worse condition. [JM]      ED Course User Index  [JM] Xu Sethi APRN           CT Head Without Contrast   Final Result   No evidence of hemorrhage, mass effect or midline shift. No acute   process identified.       This report was finalized on 7/1/2022 2:38 PM by Ar Fitch MD.          CT Abdomen Pelvis Without Contrast   Final Result   Probable small bowel enteritis. Irregularity of the dome of the bladder   likely related to the right inguinal canal hernia.               This report was finalized on 7/1/2022 2:58 PM by Ar Fitch MD.          XR Chest 1 View   Final Result       1. No acute cardiopulmonary disease.           This report was finalized on 7/1/2022 1:47 PM by Shukri Long MD.                                            [facility]    Final diagnoses:   Enteritis   Inguinal hernia, right   Near syncope   Urticaria       ED Disposition  ED Disposition     ED Disposition   Discharge    Condition   Stable    Comment   --             Pastora Carbajal, APRN  [address]  [address]  [place] [id number] [phone]    Schedule an appointment as soon as possible for a visit       [facility]  [address] [facility] [id number] [phone]    If symptoms worsen    [facility] CARDIOLOGY  [address] [facility]  [address]  [facility] [id number] [phone]             Medication List      New Prescriptions    meclizine 25 MG tablet  Commonly known as: ANTIVERT  Take 1 tablet by mouth 3 (Three) Times a Day As Needed for Dizziness.           Where to Get Your Medications      These medications were sent to [id number] - [place], [place] - [address] AT [facility] [address] - [phone]  - [phone] FX  [address], [facility] [address]    Phone: [phone]   · meclizine 25 MG tablet          Xu Sethi  APRN  07/01/22 1734

## 2022-08-16 ENCOUNTER — APPOINTMENT (OUTPATIENT)
Dept: CT IMAGING | Facility: HOSPITAL | Age: 42
End: 2022-08-16

## 2022-08-16 ENCOUNTER — HOSPITAL ENCOUNTER (EMERGENCY)
Facility: HOSPITAL | Age: 42
Discharge: HOME | End: 2022-08-16
Attending: EMERGENCY MEDICINE | Admitting: EMERGENCY MEDICINE

## 2022-08-16 VITALS
DIASTOLIC BLOOD PRESSURE: 78 MMHG | BODY MASS INDEX: 38.32 KG/M2 | SYSTOLIC BLOOD PRESSURE: 118 MMHG | TEMPERATURE: 98 F | HEIGHT: 65 IN | RESPIRATION RATE: 18 BRPM | HEART RATE: 70 BPM | WEIGHT: 230 LBS | OXYGEN SATURATION: 99 %

## 2022-08-16 DIAGNOSIS — K62.89 PROCTALGIA: ICD-10-CM

## 2022-08-16 DIAGNOSIS — K62.5 BRBPR (BRIGHT RED BLOOD PER RECTUM): Primary | ICD-10-CM

## 2022-08-16 LAB
ABO GROUP BLD: NORMAL
ALBUMIN SERPL-MCNC: 4.3 G/DL (ref 3.5–5.2)
ALBUMIN/GLOB SERPL: 1.5 G/DL
ALP SERPL-CCNC: 49 U/L (ref 39–117)
ALT SERPL W P-5'-P-CCNC: 20 U/L (ref 1–41)
ANION GAP SERPL CALCULATED.3IONS-SCNC: 9 MMOL/L (ref 5–15)
AST SERPL-CCNC: 20 U/L (ref 1–40)
BASOPHILS # BLD AUTO: 0.05 10*3/MM3 (ref 0–0.2)
BASOPHILS NFR BLD AUTO: 0.4 % (ref 0–1.5)
BILIRUB SERPL-MCNC: 0.2 MG/DL (ref 0–1.2)
BLD GP AB SCN SERPL QL: NEGATIVE
BUN BLDA-MCNC: 15 MG/DL (ref 8–26)
BUN SERPL-MCNC: 15 MG/DL (ref 6–20)
BUN/CREAT SERPL: 12.3 (ref 7–25)
CA-I BLDA-SCNC: 1.21 MMOL/L (ref 1.2–1.32)
CALCIUM SPEC-SCNC: 8.9 MG/DL (ref 8.6–10.5)
CHLORIDE BLDA-SCNC: 103 MMOL/L (ref 98–109)
CHLORIDE SERPL-SCNC: 106 MMOL/L (ref 98–107)
CO2 BLDA-SCNC: 27 MMOL/L (ref 24–29)
CO2 SERPL-SCNC: 28 MMOL/L (ref 22–29)
CREAT BLDA-MCNC: 1.4 MG/DL (ref 0.6–1.3)
CREAT SERPL-MCNC: 1.22 MG/DL (ref 0.76–1.27)
DEPRECATED RDW RBC AUTO: 46.4 FL (ref 37–54)
EGFRCR SERPLBLD CKD-EPI 2021: 64.8 ML/MIN/1.73
EGFRCR SERPLBLD CKD-EPI 2021: 76.4 ML/MIN/1.73
EOSINOPHIL # BLD AUTO: 0.03 10*3/MM3 (ref 0–0.4)
EOSINOPHIL NFR BLD AUTO: 0.3 % (ref 0.3–6.2)
ERYTHROCYTE [DISTWIDTH] IN BLOOD BY AUTOMATED COUNT: 14.2 % (ref 12.3–15.4)
GLOBULIN UR ELPH-MCNC: 2.9 GM/DL
GLUCOSE BLDC GLUCOMTR-MCNC: 94 MG/DL (ref 70–130)
GLUCOSE SERPL-MCNC: 100 MG/DL (ref 65–99)
HCT VFR BLD AUTO: 43.4 % (ref 37.5–51)
HCT VFR BLDA CALC: 45 % (ref 38–51)
HGB BLD-MCNC: 14.6 G/DL (ref 13–17.7)
HGB BLDA-MCNC: 15.3 G/DL (ref 12–17)
HOLD SPECIMEN: NORMAL
HOLD SPECIMEN: NORMAL
IMM GRANULOCYTES # BLD AUTO: 0.02 10*3/MM3 (ref 0–0.05)
IMM GRANULOCYTES NFR BLD AUTO: 0.2 % (ref 0–0.5)
LYMPHOCYTES # BLD AUTO: 3.98 10*3/MM3 (ref 0.7–3.1)
LYMPHOCYTES NFR BLD AUTO: 35.4 % (ref 19.6–45.3)
MCH RBC QN AUTO: 30.2 PG (ref 26.6–33)
MCHC RBC AUTO-ENTMCNC: 33.6 G/DL (ref 31.5–35.7)
MCV RBC AUTO: 89.7 FL (ref 79–97)
MONOCYTES # BLD AUTO: 0.93 10*3/MM3 (ref 0.1–0.9)
MONOCYTES NFR BLD AUTO: 8.3 % (ref 5–12)
NEUTROPHILS NFR BLD AUTO: 55.4 % (ref 42.7–76)
NEUTROPHILS NFR BLD AUTO: 6.24 10*3/MM3 (ref 1.7–7)
NRBC BLD AUTO-RTO: 0 /100 WBC (ref 0–0.2)
PLAT MORPH BLD: NORMAL
PLATELET # BLD AUTO: 192 10*3/MM3 (ref 140–450)
PMV BLD AUTO: 13.6 FL (ref 6–12)
POTASSIUM BLDA-SCNC: 3.9 MMOL/L (ref 3.5–4.9)
POTASSIUM SERPL-SCNC: 4.1 MMOL/L (ref 3.5–5.2)
PROT SERPL-MCNC: 7.2 G/DL (ref 6–8.5)
RBC # BLD AUTO: 4.84 10*6/MM3 (ref 4.14–5.8)
RBC MORPH BLD: NORMAL
RH BLD: POSITIVE
SODIUM BLD-SCNC: 143 MMOL/L (ref 138–146)
SODIUM SERPL-SCNC: 143 MMOL/L (ref 136–145)
T&S EXPIRATION DATE: NORMAL
WBC MORPH BLD: NORMAL
WBC NRBC COR # BLD: 11.25 10*3/MM3 (ref 3.4–10.8)
WHOLE BLOOD HOLD COAG: NORMAL
WHOLE BLOOD HOLD SPECIMEN: NORMAL

## 2022-08-16 PROCEDURE — 80047 BASIC METABLC PNL IONIZED CA: CPT

## 2022-08-16 PROCEDURE — 85025 COMPLETE CBC W/AUTO DIFF WBC: CPT | Performed by: EMERGENCY MEDICINE

## 2022-08-16 PROCEDURE — 86901 BLOOD TYPING SEROLOGIC RH(D): CPT

## 2022-08-16 PROCEDURE — 99283 EMERGENCY DEPT VISIT LOW MDM: CPT

## 2022-08-16 PROCEDURE — 86850 RBC ANTIBODY SCREEN: CPT | Performed by: EMERGENCY MEDICINE

## 2022-08-16 PROCEDURE — 85007 BL SMEAR W/DIFF WBC COUNT: CPT | Performed by: EMERGENCY MEDICINE

## 2022-08-16 PROCEDURE — 86900 BLOOD TYPING SEROLOGIC ABO: CPT

## 2022-08-16 PROCEDURE — 86901 BLOOD TYPING SEROLOGIC RH(D): CPT | Performed by: EMERGENCY MEDICINE

## 2022-08-16 PROCEDURE — 80053 COMPREHEN METABOLIC PANEL: CPT | Performed by: EMERGENCY MEDICINE

## 2022-08-16 PROCEDURE — 85014 HEMATOCRIT: CPT

## 2022-08-16 PROCEDURE — 25010000002 IOPAMIDOL 61 % SOLUTION: Performed by: EMERGENCY MEDICINE

## 2022-08-16 PROCEDURE — 74177 CT ABD & PELVIS W/CONTRAST: CPT

## 2022-08-16 PROCEDURE — 86900 BLOOD TYPING SEROLOGIC ABO: CPT | Performed by: EMERGENCY MEDICINE

## 2022-08-16 RX ORDER — SODIUM CHLORIDE 0.9 % (FLUSH) 0.9 %
10 SYRINGE (ML) INJECTION AS NEEDED
Status: DISCONTINUED | OUTPATIENT
Start: 2022-08-16 | End: 2022-08-17 | Stop reason: HOSPADM

## 2022-08-16 RX ORDER — AMLODIPINE BESYLATE 5 MG/1
TABLET ORAL EVERY 24 HOURS
COMMUNITY
End: 2022-08-16

## 2022-08-16 RX ORDER — QUETIAPINE FUMARATE 100 MG/1
TABLET, FILM COATED ORAL
COMMUNITY
End: 2022-08-16

## 2022-08-16 RX ORDER — LISINOPRIL 40 MG/1
TABLET ORAL EVERY 24 HOURS
COMMUNITY

## 2022-08-16 RX ORDER — PRAZOSIN HYDROCHLORIDE 2 MG/1
CAPSULE ORAL
COMMUNITY
End: 2022-08-16

## 2022-08-16 RX ORDER — ATORVASTATIN CALCIUM 80 MG/1
TABLET, FILM COATED ORAL EVERY 24 HOURS
COMMUNITY
End: 2022-08-16

## 2022-08-16 RX ORDER — DIVALPROEX SODIUM 500 MG/1
TABLET, DELAYED RELEASE ORAL EVERY 8 HOURS SCHEDULED
COMMUNITY
End: 2022-08-16

## 2022-08-16 RX ORDER — SERTRALINE HYDROCHLORIDE 100 MG/1
TABLET, FILM COATED ORAL EVERY 24 HOURS
COMMUNITY
End: 2022-08-16

## 2022-08-16 RX ORDER — RANOLAZINE 500 MG/1
TABLET, EXTENDED RELEASE ORAL EVERY 12 HOURS SCHEDULED
COMMUNITY
End: 2022-08-16

## 2022-08-16 RX ORDER — HYDROCHLOROTHIAZIDE 25 MG/1
TABLET ORAL EVERY 24 HOURS
COMMUNITY
End: 2022-08-16

## 2022-08-16 RX ORDER — METOPROLOL SUCCINATE 25 MG/1
TABLET, EXTENDED RELEASE ORAL EVERY 24 HOURS
COMMUNITY
End: 2022-08-16

## 2022-08-16 RX ADMIN — IOPAMIDOL 85 ML: 612 INJECTION, SOLUTION INTRAVENOUS at 21:40

## 2022-08-17 LAB — HOLD SPECIMEN: NORMAL

## 2022-08-17 NOTE — DISCHARGE INSTRUCTIONS
Follow-up with [facility] for further evaluation of your rectal bleeding.  Begin using Proctofoam twice daily.  Return to the emergency department immediately for any change or worsening of symptoms.

## 2022-08-17 NOTE — ED PROVIDER NOTES
" EMERGENCY DEPARTMENT ENCOUNTER    Pt Name: Olivier Swift  MRN: [id number]  Pt :   1980  Room Number:  24SF/24  Date of encounter:  2022  PCP: Pastora Carbajal APRN  ED Provider: Star Cheung PA-C    Historian: Patient      HPI:  Chief Complaint: 3-day history of bright red blood per rectum, and rectal pain        Context: Olivier Swift is a 42 y.o. male who presents to the ED c/o 3-day history of BRBPR with rectal pain and tenesmus following bowel movements.  Patient states blood is mixed in with the stool.  No fevers chills or sweats.  No prior history of IBD, IBS, hemorrhoids or rectal bleeding.  No dizziness lightheadedness or orthostasis.  No anticoagulants.  He is an insulin dependent diabetic with a history of depression anxiety bipolar depression, hypertension sleep apnea and a \"leaky heart valve\".      PAST MEDICAL HISTORY  Past Medical History:   Diagnosis Date   • Anxiety    • Bipolar depression (HCC)    • Depression    • Diabetes mellitus (HCC)    • Hypertension    • Leaky heart valve    • Sleep apnea          PAST SURGICAL HISTORY  History reviewed. No pertinent surgical history.      FAMILY HISTORY  Family History   Problem Relation Age of Onset   • Diabetes Maternal Grandmother          SOCIAL HISTORY  Social History     Socioeconomic History   • Marital status:    Tobacco Use   • Smoking status: Current Some Day Smoker     Types: Cigars   • Smokeless tobacco: Never Used   Vaping Use   • Vaping Use: Never used   Substance and Sexual Activity   • Alcohol use: Never   • Drug use: Never   • Sexual activity: Defer         ALLERGIES  Trazodone        REVIEW OF SYSTEMS  Review of Systems   Constitutional: Negative for activity change, appetite change, chills, fatigue and fever.   HENT: Negative for congestion, rhinorrhea and sore throat.    Respiratory: Negative for cough, chest tightness, shortness of breath and wheezing.    Cardiovascular: Negative for chest pain and " palpitations.   Gastrointestinal: Positive for blood in stool and rectal pain. Negative for abdominal pain.   Genitourinary: Negative for dysuria, flank pain and hematuria.   Musculoskeletal: Negative for arthralgias, back pain, myalgias and neck pain.   Neurological: Negative for dizziness, seizures, syncope and headaches.          All systems reviewed and negative except for those discussed in HPI.       PHYSICAL EXAM    I have reviewed the triage vital signs and nursing notes.    ED Triage Vitals [08/16/22 1755]   Temp Heart Rate Resp BP SpO2   98 °F (36.7 °C) 70 18 118/78 99 %      Temp src Heart Rate Source Patient Position BP Location FiO2 (%)   Oral Monitor Sitting Left arm --       Physical Exam  GENERAL:   Awake alert and oriented afebrile hemodynamically stable, not in acute distress.  HENT: Nares patent.  EYES: No scleral icterus.  CV: Regular rhythm, regular rate.  RESPIRATORY: Normal effort.  No audible wheezes, rales or rhonchi.  ABDOMEN: Soft, nontender, no guarding or rebound tenderness.  Bowel sounds are normal.  No palpable organomegaly pulsatile mass.  RECTAL: No external hemorrhoidal tags noted.  Marked tenderness and pain with ZACH.  No obvious stool noted on glove, but is heme positive.  MUSCULOSKELETAL: No deformities.   NEURO: Alert, moves all extremities, follows commands.  SKIN: Warm, dry, no rash visualized.        LAB RESULTS  Recent Results (from the past 24 hour(s))   Comprehensive Metabolic Panel    Collection Time: 08/16/22  9:06 PM    Specimen: Blood   Result Value Ref Range    Glucose 100 (H) 65 - 99 mg/dL    BUN 15 6 - 20 mg/dL    Creatinine 1.22 0.76 - 1.27 mg/dL    Sodium 143 136 - 145 mmol/L    Potassium 4.1 3.5 - 5.2 mmol/L    Chloride 106 98 - 107 mmol/L    CO2 28.0 22.0 - 29.0 mmol/L    Calcium 8.9 8.6 - 10.5 mg/dL    Total Protein 7.2 6.0 - 8.5 g/dL    Albumin 4.30 3.50 - 5.20 g/dL    ALT (SGPT) 20 1 - 41 U/L    AST (SGOT) 20 1 - 40 U/L    Alkaline Phosphatase 49 39 - 117 U/L     Total Bilirubin 0.2 0.0 - 1.2 mg/dL    Globulin 2.9 gm/dL    A/G Ratio 1.5 g/dL    BUN/Creatinine Ratio 12.3 7.0 - 25.0    Anion Gap 9.0 5.0 - 15.0 mmol/L    eGFR 76.4 >60.0 mL/min/1.73   Type & Screen    Collection Time: 08/16/22  9:06 PM    Specimen: Blood   Result Value Ref Range    ABO Type A     RH type Positive     Antibody Screen Negative     T&S Expiration Date 8/19/2022 11:59:59 PM    Green Top (Gel)    Collection Time: 08/16/22  9:06 PM   Result Value Ref Range    Extra Tube Hold for add-ons.    Lavender Top    Collection Time: 08/16/22  9:06 PM   Result Value Ref Range    Extra Tube hold for add-on    Gold Top - SST    Collection Time: 08/16/22  9:06 PM   Result Value Ref Range    Extra Tube Hold for add-ons.    Light Blue Top    Collection Time: 08/16/22  9:06 PM   Result Value Ref Range    Extra Tube Hold for add-ons.    CBC Auto Differential    Collection Time: 08/16/22  9:06 PM    Specimen: Blood   Result Value Ref Range    WBC 11.25 (H) 3.40 - 10.80 10*3/mm3    RBC 4.84 4.14 - 5.80 10*6/mm3    Hemoglobin 14.6 13.0 - 17.7 g/dL    Hematocrit 43.4 37.5 - 51.0 %    MCV 89.7 79.0 - 97.0 fL    MCH 30.2 26.6 - 33.0 pg    MCHC 33.6 31.5 - 35.7 g/dL    RDW 14.2 12.3 - 15.4 %    RDW-SD 46.4 37.0 - 54.0 fl    MPV 13.6 (H) 6.0 - 12.0 fL    Platelets 192 140 - 450 10*3/mm3    Neutrophil % 55.4 42.7 - 76.0 %    Lymphocyte % 35.4 19.6 - 45.3 %    Monocyte % 8.3 5.0 - 12.0 %    Eosinophil % 0.3 0.3 - 6.2 %    Basophil % 0.4 0.0 - 1.5 %    Immature Grans % 0.2 0.0 - 0.5 %    Neutrophils, Absolute 6.24 1.70 - 7.00 10*3/mm3    Lymphocytes, Absolute 3.98 (H) 0.70 - 3.10 10*3/mm3    Monocytes, Absolute 0.93 (H) 0.10 - 0.90 10*3/mm3    Eosinophils, Absolute 0.03 0.00 - 0.40 10*3/mm3    Basophils, Absolute 0.05 0.00 - 0.20 10*3/mm3    Immature Grans, Absolute 0.02 0.00 - 0.05 10*3/mm3    nRBC 0.0 0.0 - 0.2 /100 WBC   Scan Slide    Collection Time: 08/16/22  9:06 PM    Specimen: Blood   Result Value Ref Range    RBC  Morphology Normal Normal    WBC Morphology Normal Normal    Platelet Morphology Normal Normal   POC CHEM 8    Collection Time: 08/16/22  9:08 PM    Specimen: Blood   Result Value Ref Range    Glucose 94 70 - 130 mg/dL    BUN 15 8 - 26 mg/dL    Creatinine 1.40 (H) 0.60 - 1.30 mg/dL    Sodium 143 138 - 146 mmol/L    POC Potassium 3.9 3.5 - 4.9 mmol/L    Chloride 103 98 - 109 mmol/L    Total CO2 27 24 - 29 mmol/L    Hemoglobin 15.3 12.0 - 17.0 g/dL    Hematocrit 45 38 - 51 %    Ionized Calcium 1.21 1.20 - 1.32 mmol/L    eGFR 64.8 >60.0 mL/min/1.73       If labs were ordered, I independently reviewed the results.        RADIOLOGY  CT Abdomen Pelvis With Contrast    Result Date: 8/16/2022  DATE OF EXAM: 8/16/2022 9:33 PM  PROCEDURE: CT ABDOMEN PELVIS W CONTRAST-  INDICATIONS: Rectal pain, BRBPR  COMPARISON: 7/1/2022  TECHNIQUE: Routine transaxial slices were obtained through the abdomen and pelvis after the intravenous administration of 85 mL of Isovue 300. Reconstructed coronal and sagittal images were also obtained. Automated exposure control and iterative construction methods were used.  The radiation dose reduction device was turned on for each scan per the ALARA (As Low as Reasonably Achievable) protocol.  FINDINGS: Included lung bases appear clear of active disease. There is fatty liver change. Gallbladder is contracted but otherwise appears normal. Spleen is not enlarged. No significant abnormalities are seen of the pancreas, adrenal glands, or kidneys. No upper abdominal free air ascites adenopathy or acute inflammatory change is seen. Large and small bowel loops are normal in caliber and appearance. Terminal ileum and cecum appear grossly normal. The appendix is not identified but no enlarged or inflamed appendix is seen. No abnormally dilated or inflamed appearing bowel loops are seen. No intrapelvic free fluid or inflammatory focus is seen. There is a small nonstrangulated fat-only containing right inguinal  hernia and perhaps a minimal potential left inguinal hernia. Bladder is completely decompressed.  Delayed venous phase images show no evidence of obstructive uropathy.  With respect to the patient's history of rectal pain and bleeding, additional images were performed to include the perianal region. No mass or inflammation is seen. Perirectal fat is clear of inflammatory change. No mucosal thickening or other irregularity is seen. Prostate and seminal vesicles are not enlarged.         1. No evidence of acute intra-abdominal or intrapelvic disease. In particular, no evidence of rectal, perianal, or other colonic inflammatory change is seen. 2. Incidentally noted small fat initially containing right inguinal hernia. No evidence of bowel-containing hernia.  This report was finalized on 8/16/2022 10:38 PM by Dr. Rob Juarez MD.            PROCEDURES    Procedures    No orders to display       MEDICATIONS GIVEN IN ER    Medications   sodium chloride 0.9 % flush 10 mL (has no administration in time range)   sodium chloride 0.9 % flush 10 mL (has no administration in time range)   iopamidol (ISOVUE-300) 61 % injection 100 mL (85 mL Intravenous Given 8/16/22 2140)         PROGRESS, DATA ANALYSIS, CONSULTS, AND MEDICAL DECISION MAKING    All labs have been independently reviewed by me.  All radiology studies have been reviewed by me and the radiologist dictating the report.   EKG's have been independently viewed and interpreted by me.      Differential diagnoses: Hemorrhoids, anal fissure, proctitis      ED Course as of 08/16/22 2250   Tue Aug 16, 2022   2113 Creatinine(!): 1.40 [TG]   2113 eGFR: 64.8 [TG]      ED Course User Index  [TG] Star Cheung PA-C       No acute findings on CT.  We will discharged home with Anusol suppositories, refer to colorectal for further evaluation.  Signs symptoms of worsening were reviewed he was encouraged return immediately if any change or worsening of symptoms.  He verbalizes  understanding and is agreeable to plan.      AS OF 22:50 EDT VITALS:    BP - 118/78  HR - 70  TEMP - 98 °F (36.7 °C) (Oral)  O2 SATS - 99%                  DIAGNOSIS  Final diagnoses:   BRBPR (bright red blood per rectum)   Proctalgia         DISPOSITION  DISCHARGE    Patient discharged in stable condition.    Reviewed implications of results, diagnosis, meds, responsibility to follow up, warning signs and symptoms of possible worsening, potential complications and reasons to return to ER.    Patient/Family voiced understanding of above instructions.    Discussed plan for discharge, as there is no emergent indication for admission.  Pt/family is agreeable and understands need for follow up and possible repeat testing.  Pt/family is aware that discharge does not mean that nothing is wrong but that it indicates no emergency is currently present that requires admission and they must continue care with follow-up as given below or with a physician of their choice.     FOLLOW-UP  Pastora Carbajal, APRN  [address]  [place] [id number] [phone]    Schedule an appointment as soon as possible for a visit in 2 days  For general recheck.    [facility] ([facility])  [address] [place] [id number] [phone]  Schedule an appointment as soon as possible for a visit   For further evaluation of your rectal bleeding.         Medication List      New Prescriptions    Hydrocort-Pramoxine (Perianal) 1-1 % rectal foam  Commonly known as: PROCTOFOAM-HS  Insert 1 application into the rectum 2 (Two) Times a Day.           Where to Get Your Medications      These medications were sent to [id number] - [place], [place] - [address] AT [facility] [address] - [phone]  - [phone] FX  [address], [facility] [address]    Phone: [phone]   · Hydrocort-Pramoxine (Perianal) 1-1 % rectal foam                  Skye  Star Goncalves PA-C  08/16/22 1121

## 2023-03-26 ENCOUNTER — HOSPITAL ENCOUNTER (EMERGENCY)
Facility: HOSPITAL | Age: 43
Discharge: HOME | End: 2023-03-26
Attending: EMERGENCY MEDICINE | Admitting: EMERGENCY MEDICINE
Payer: COMMERCIAL

## 2023-03-26 VITALS
OXYGEN SATURATION: 98 % | RESPIRATION RATE: 18 BRPM | BODY MASS INDEX: 36.16 KG/M2 | HEART RATE: 87 BPM | TEMPERATURE: 97.7 F | HEIGHT: 66 IN | DIASTOLIC BLOOD PRESSURE: 95 MMHG | WEIGHT: 225 LBS | SYSTOLIC BLOOD PRESSURE: 131 MMHG

## 2023-03-26 DIAGNOSIS — M54.32 BILATERAL SCIATICA: Primary | ICD-10-CM

## 2023-03-26 DIAGNOSIS — M54.31 BILATERAL SCIATICA: Primary | ICD-10-CM

## 2023-03-26 PROCEDURE — 99282 EMERGENCY DEPT VISIT SF MDM: CPT

## 2023-03-26 RX ORDER — CYCLOBENZAPRINE HCL 10 MG
10 TABLET ORAL 3 TIMES DAILY PRN
Qty: 12 TABLET | Refills: 0 | Status: SHIPPED | OUTPATIENT
Start: 2023-03-26

## 2023-03-26 NOTE — ED PROVIDER NOTES
Subjective   History of Present Illness  43-year-old male complaining of back pain.  He reports he had 20 years of back pain from heavy labor.  He states over the past 2 weeks the pain is gotten worse.  Is worse with sitting.  He has pain down both legs but not below the knee.  He has no loss of bowel or bladder control.  He has no dysuria frequency urgency or hematuria.  He has had no numbness in the saddle distribution.  Sitting that does seem to increase his pain.  He is an insulin-dependent diabetic as well.  He does have a primary care doctor.  I she had x-rays done last week which revealed some scoliosis and arthritis but no other acute findings    History provided by:  Patient   used: No    Back Pain  Location:  Lumbar spine  Quality:  Stiffness and burning  Radiates to:  R thigh, L thigh, R knee and L knee  Pain severity:  Severe  Onset quality:  Gradual  Timing:  Constant  Progression:  Worsening  Chronicity:  New  Context: not falling, not lifting heavy objects, not MVA, not occupational injury, not pedestrian accident, not recent illness and not recent injury    Relieved by:  Nothing  Worsened by:  Nothing  Ineffective treatments:  None tried  Associated symptoms: no abdominal pain, no bladder incontinence, no bowel incontinence, no chest pain, no fever, no perianal numbness and no weakness    Risk factors: no hx of osteoporosis and no vascular disease        Review of Systems   Constitutional: Negative for chills and fever.   Respiratory: Negative for chest tightness, shortness of breath and wheezing.    Cardiovascular: Negative for chest pain and palpitations.   Gastrointestinal: Negative for abdominal pain and bowel incontinence.   Genitourinary: Negative for bladder incontinence.   Musculoskeletal: Positive for back pain.   Neurological: Negative for weakness.   Psychiatric/Behavioral: Negative.    All other systems reviewed and are negative.      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    Family History   Problem Relation Age of Onset   • Diabetes Maternal Grandmother        Social History     Socioeconomic History   • Marital status:    Tobacco Use   • Smoking status: Some Days     Types: Cigars   • Smokeless tobacco: Never   Vaping Use   • Vaping Use: Never used   Substance and Sexual Activity   • Alcohol use: Never   • Drug use: Never   • Sexual activity: Defer           Objective   Physical Exam  Vitals and nursing note reviewed.   Constitutional:       Appearance: He is well-developed.   HENT:      Head: Normocephalic and atraumatic.      Right Ear: External ear normal.      Left Ear: External ear normal.      Nose: Nose normal.   Eyes:      General: No scleral icterus.     Conjunctiva/sclera: Conjunctivae normal.      Pupils: Pupils are equal, round, and reactive to light.   Neck:      Thyroid: No thyromegaly.   Cardiovascular:      Rate and Rhythm: Normal rate and regular rhythm.   Pulmonary:      Effort: Pulmonary effort is normal. No respiratory distress.   Chest:      Chest wall: No tenderness.   Musculoskeletal:         General: Normal range of motion.      Cervical back: Normal range of motion.   Lymphadenopathy:      Cervical: No cervical adenopathy.   Skin:     General: Skin is warm and dry.   Neurological:      Mental Status: He is alert and oriented to person, place, and time.      Cranial Nerves: No cranial nerve deficit.      Coordination: Coordination normal.      Deep Tendon Reflexes: Reflexes are normal and symmetric. Reflexes normal.   Psychiatric:         Behavior: Behavior normal.         Thought " "Content: Thought content normal.         Judgment: Judgment normal.         Procedures           ED Course           No results found for this or any previous visit (from the past 24 hour(s)).  Note: In addition to lab results from this visit, the labs listed above may include labs taken at another facility or during a different encounter within the last 24 hours. Please correlate lab times with ED admission and discharge times for further clarification of the services performed during this visit.    No orders to display     Vitals:    03/26/23 1314   BP: 131/95   BP Location: Left arm   Patient Position: Lying   Pulse: 87   Resp: 18   Temp: 97.7 °F (36.5 °C)   TempSrc: Oral   SpO2: 98%   Weight: 102 kg (225 lb)   Height: 167.6 cm (66\")     Medications - No data to display  ECG/EMG Results (last 24 hours)     ** No results found for the last 24 hours. **        No orders to display                                       Medical Decision Making  Longstanding back pain.  No loss of bowel or bladder control.  Has had recent x-rays negative.  We will refer him to physical therapy.  Give him some NSAIDs to use and Flexeril to use at nighttime.  He will follow-up with his PMD for consideration of outpatient MRI.  He has no loss of bowel or bladder strength no numbness in the saddle distribution and no loss of strength.  Differential diagnosis #1 HNP #2 spinal stenosis #3 sciatica    Bilateral sciatica: acute illness or injury  Risk  Prescription drug management.          Final diagnoses:   Bilateral sciatica       ED Disposition  ED Disposition     ED Disposition   Discharge    Condition   Stable    Comment   --             Pastora Carbajal, APRN  [address] [facility] [address]  [place] [id number]  [phone]               Medication List      New Prescriptions    cyclobenzaprine 10 MG tablet  Commonly known as: FLEXERIL  Take 1 tablet by mouth 3 (Three) Times a Day As Needed for Muscle Spasms.     diclofenac 50 MG EC " tablet  Commonly known as: VOLTAREN  Take 1 tablet by mouth 3 (Three) Times a Day.           Where to Get Your Medications      These medications were sent to [id number] - [place], [place] - [address] AT [facility] [address] - [phone]  - [phone] FX  [address], [facility] [address]    Phone: [phone]   · cyclobenzaprine 10 MG tablet  · diclofenac 50 MG EC tablet          Xu Lee PA  03/26/23 1918

## 2023-04-11 ENCOUNTER — TRANSCRIBE ORDERS (OUTPATIENT)
Dept: PHYSICAL THERAPY | Facility: HOSPITAL | Age: 43
End: 2023-04-11
Payer: COMMERCIAL

## 2023-04-11 DIAGNOSIS — M54.9 DORSALGIA: Primary | ICD-10-CM

## 2023-06-05 ENCOUNTER — OFFICE VISIT (OUTPATIENT)
Dept: NEUROSURGERY | Facility: CLINIC | Age: 43
End: 2023-06-05
Payer: COMMERCIAL

## 2023-06-05 VITALS — BODY MASS INDEX: 36.64 KG/M2 | TEMPERATURE: 97.3 F | HEIGHT: 66 IN | WEIGHT: 228 LBS

## 2023-06-05 DIAGNOSIS — M79.605 PAIN OF LEFT LOWER EXTREMITY: ICD-10-CM

## 2023-06-05 DIAGNOSIS — M51.36 DDD (DEGENERATIVE DISC DISEASE), LUMBAR: Primary | ICD-10-CM

## 2023-06-05 PROCEDURE — 99204 OFFICE O/P NEW MOD 45 MIN: CPT | Performed by: NEUROLOGICAL SURGERY

## 2023-06-05 RX ORDER — INSULIN DETEMIR 100 [IU]/ML
15 INJECTION, SOLUTION SUBCUTANEOUS DAILY
COMMUNITY
Start: 2023-05-31

## 2023-06-05 NOTE — PROGRESS NOTES
"  NAME: SHAHAB HAINES   DOS: 2023  : 1980  PCP: Pastora Carbajal APRN    Chief Complaint:    Chief Complaint   Patient presents with    Low Back Pain & Bilateral Leg Pain       History of Present Illness:  43 y.o. male   Almost 43-year-old gentleman in neurosurgical consultation he has a history of diabetes and used to work in  shipyard he is accompanied by his wife who is in the     Who presents with a chronic history of low back pain has been present since his childhood years he throws his back out a few times a year and interestingly it took about 10 minutes to get this history about 8 weeks ago experience relatively abrupt onset of severe back and left hip and leg pain has gotten better but is still somewhat annoying he denies any cauda equina syndrome and he is here for evaluation he does have a history of diabetes    PMHX  Allergies:  Allergies   Allergen Reactions    Trazodone Other (See Comments)     \" CAUSED ME TO HAVE AN ERECTION\"      Medications    Current Outpatient Medications:     amLODIPine (NORVASC) 5 MG tablet, Take 1 tablet by mouth Daily., Disp: , Rfl:     atorvastatin (LIPITOR) 80 MG tablet, Take 1 tablet by mouth Daily., Disp: , Rfl:     BD Pen Needle Micro U/F 32G X 6 MM misc, USE 1 NEEDLE ONCE DAILY, Disp: , Rfl:     Continuous Blood Gluc Sensor (Dexcom G6 Sensor), USE ONE SENSOR WITH TRANSMITTER EVERY 10 DAYS, Disp: , Rfl:     Continuous Blood Gluc Transmit (Dexcom G6 Transmitter) misc, APPLY 1 MONTHLY TO ABDOMEN, Disp: , Rfl:     cyclobenzaprine (FLEXERIL) 10 MG tablet, Take 1 tablet by mouth 3 (Three) Times a Day As Needed for Muscle Spasms., Disp: 12 tablet, Rfl: 0    diclofenac (VOLTAREN) 50 MG EC tablet, Take 1 tablet by mouth 3 (Three) Times a Day., Disp: 15 tablet, Rfl: 0    divalproex (DEPAKOTE) 500 MG DR tablet, Take 1 tablet by mouth 3 (Three) Times a Day., Disp: , Rfl:     hydroCHLOROthiazide (HYDRODIURIL) 25 MG tablet, Take 1 tablet by mouth Daily., " Disp: , Rfl:     Hydrocort-Pramoxine, Perianal, (PROCTOFOAM-HS) 1-1 % rectal foam, Insert 1 application into the rectum 2 (Two) Times a Day., Disp: 10 g, Rfl: 0    Levemir FlexTouch 100 UNIT/ML injection, Inject 15 Units under the skin into the appropriate area as directed Daily., Disp: , Rfl:     lisinopril (PRINIVIL,ZESTRIL) 40 MG tablet, Daily., Disp: , Rfl:     meclizine (ANTIVERT) 25 MG tablet, Take 1 tablet by mouth 3 (Three) Times a Day As Needed for Dizziness., Disp: 12 tablet, Rfl: 0    metFORMIN (GLUCOPHAGE) 500 MG tablet, Take 1 tablet by mouth 2 (Two) Times a Day With Meals., Disp: 180 tablet, Rfl: 1    metoprolol succinate XL (TOPROL-XL) 25 MG 24 hr tablet, Take 1 tablet by mouth Daily., Disp: , Rfl:     metoprolol tartrate (LOPRESSOR) 25 MG tablet, Take 1 tablet by mouth 2 (Two) Times a Day., Disp: , Rfl:     prazosin (MINIPRESS) 2 MG capsule, Take 1 capsule by mouth Every Night., Disp: , Rfl:     PROPRANOLOL HCL PO, Take  by mouth., Disp: , Rfl:     QUEtiapine (SEROquel) 100 MG tablet, Take 1 tablet by mouth Every Night., Disp: , Rfl:     ranolazine (RANEXA) 500 MG 12 hr tablet, Take 1 tablet by mouth 2 (Two) Times a Day., Disp: , Rfl:     Semaglutide, 1 MG/DOSE, (OZEMPIC) 2 MG/1.5ML solution pen-injector, Inject  under the skin into the appropriate area as directed 1 (One) Time Per Week., Disp: , Rfl:     sertraline (ZOLOFT) 100 MG tablet, Take 1 tablet by mouth Daily., Disp: , Rfl:     sertraline (ZOLOFT) 100 MG tablet, Daily., Disp: , Rfl:   Past Medical History:  Past Medical History:   Diagnosis Date    Anxiety     Bipolar depression     Depression     Diabetes mellitus     Diabetic amyotrophy associated with type 2 diabetes mellitus     Hypertension     Leaky heart valve     Sleep apnea      Past Surgical History:  History reviewed. No pertinent surgical history.  Social Hx:  Social History     Tobacco Use    Smoking status: Some Days     Types: Cigars    Smokeless tobacco: Never   Vaping Use     Vaping Use: Never used   Substance Use Topics    Alcohol use: Never    Drug use: Never     Family Hx:  Family History   Problem Relation Age of Onset    Diabetes Maternal Grandmother      Review of Systems:        Review of Systems   Constitutional:  Negative for activity change, appetite change, chills, diaphoresis, fatigue, fever and unexpected weight change.   HENT:  Negative for congestion, dental problem, drooling, ear discharge, ear pain, facial swelling, hearing loss, mouth sores, nosebleeds, postnasal drip, rhinorrhea, sinus pressure, sinus pain, sneezing, sore throat, tinnitus, trouble swallowing and voice change.    Eyes:  Negative for photophobia, pain, discharge, redness, itching and visual disturbance.   Respiratory:  Negative for apnea, cough, choking, chest tightness, shortness of breath, wheezing and stridor.    Cardiovascular:  Negative for chest pain, palpitations and leg swelling.   Gastrointestinal:  Negative for abdominal distention, abdominal pain, anal bleeding, blood in stool, constipation, diarrhea, nausea, rectal pain and vomiting.   Endocrine: Negative for cold intolerance, heat intolerance, polydipsia, polyphagia and polyuria.   Genitourinary:  Negative for decreased urine volume, difficulty urinating, dysuria, enuresis, flank pain, frequency, genital sores, hematuria, penile discharge, penile pain, penile swelling, scrotal swelling, testicular pain and urgency.   Musculoskeletal:  Positive for back pain. Negative for arthralgias, gait problem, joint swelling, myalgias, neck pain and neck stiffness.   Skin:  Negative for color change, pallor, rash and wound.   Allergic/Immunologic: Negative for environmental allergies, food allergies and immunocompromised state.   Neurological:  Positive for weakness and numbness. Negative for dizziness, tremors, seizures, syncope, facial asymmetry, speech difficulty, light-headedness and headaches.   Hematological:  Negative for adenopathy. Does not  bruise/bleed easily.   Psychiatric/Behavioral:  Negative for agitation, behavioral problems, confusion, decreased concentration, dysphoric mood, hallucinations, self-injury, sleep disturbance and suicidal ideas. The patient is not nervous/anxious and is not hyperactive.       I have reviewed this note template and all pertinent parts of the review of systems social, family history, surgical history and medication list    Physical Examination:  Vitals:    06/05/23 1313   Temp: 97.3 °F (36.3 °C)      General Appearance:   Well developed, well nourished, well groomed, alert, and cooperative.  Neurological examination:  Neurologic Exam  Vital signs were reviewed and documented in the chart  Patient appeared in good neurologic function with normal comprehension fluent speech  Mood and affect are normal  Sense of smell deferred  He is stout and his build    Muscle bulk and tone normal  5 out of 5 strength no motor drift  Gait normal intact  Negative Romberg  No clonus long tract signs or myelopathy    Reflexes symmetrically trace  No edema noted and extremities skin appears normal  Intact vibratory sensation  Straight leg raise sign absent  No signs of intrinsic hip dysfunction  Back is without any lesions or abnormality  Feet are warm and well perfused        Review of Imaging/DATA:  I personally reviewed an MRI of his lumbar spine at L4-5 he has central canal stenosis with I suspect a left eccentric disc bulge he is pretty tight at this level like the films and interpreted the films personally he has loss of lumbar lordosis  Diagnoses/Plan:    Mr. Swift is a 43 y.o. male   1.  Lumbar spondylosis    2.  Chronic axial back pain    3.  Diabetes    4.  I suspect a small left eccentric disc herniation with moderate to severe central stenosis with resolution or resolving left hip and leg pain    I explained the risk benefits and expected outcome of major elective surgery for their problem, complications from approach, and  infection, the risk of neurologic implications after surgery as well as need for repeat surgeries and most importantly failure to achieve quality of life improvement from the surgery to the patient.  I talked about treatment options that would range from lumbar discectomy at the left L4-5 area for his acute left hip and leg pain it comes with a low upfront risk given his diabetic history but overshooting for more chronic management of his axial spinal pain lumbar fusion may be more appropriate    I would try some conservative measures first    Plan    Physical therapy    Referral to interventional pain management for predominantly left-sided hip and leg pain    Follow-up lumbar flexion-extension films after above we will make arrangements for this and we can discuss surgical plans discectomy fusions etc. pending the results of those findings

## 2023-06-05 NOTE — PATIENT INSTRUCTIONS
Dr. Dietrich's recommendations:    - Pain Management for Steroid Injections  - Physical Therapy  - Follow up with Lumbar Flexion / Extension X-Ray with Dr. Dietrich    AFTER ALL above completed.

## 2023-06-08 ENCOUNTER — OFFICE VISIT (OUTPATIENT)
Dept: PAIN MEDICINE | Facility: CLINIC | Age: 43
End: 2023-06-08
Payer: COMMERCIAL

## 2023-06-08 VITALS
RESPIRATION RATE: 14 BRPM | WEIGHT: 231.2 LBS | HEART RATE: 85 BPM | HEIGHT: 66 IN | SYSTOLIC BLOOD PRESSURE: 136 MMHG | OXYGEN SATURATION: 96 % | BODY MASS INDEX: 37.16 KG/M2 | DIASTOLIC BLOOD PRESSURE: 92 MMHG | TEMPERATURE: 96.3 F

## 2023-06-08 DIAGNOSIS — M48.062 LUMBAR STENOSIS WITH NEUROGENIC CLAUDICATION: Primary | ICD-10-CM

## 2023-06-08 NOTE — PROGRESS NOTES
06/08/2023      Referring Physician: Jesus Dietrich MD  [address] [facility] [address]  [place] [address]    Primary Physician: Pastora Carbajal APRN    CHIEF COMPLAINT or REASON FOR VISIT: Back Pain (New patient)      HISTORY OF PRESENT ILLNESS:  Mr. Olivier Swift is 43 y.o. male who presents as a new patient referral for evaluation treatment of left sided low back pain with radiation of the extremity.  He states that he has had this issue for decades however it has been worsening over the last year or so without any inciting event or trauma.  He has never had any spine surgeries or interventions.  He describes numbness tingling and pain left side of his low back radiating down the lateral aspect of his thigh and leg into the foot.  He does have some subjective weakness in his left leg.  He uses a straight cane for this issue.  He has tried physical therapy which was not beneficial as well as NSAIDs and muscle relaxers.  He recently saw neurosurgery, Dr. Jesus Dietrich MD, who recommended interventional pain management therapies and if pain continues he would likely offer surgery.        Objective Pain Scoring:   BRIEF PAIN INVENTORY:  Total score:   Pain Score    06/08/23 1230   PainSc:   9   PainLoc: Back      PHQ-2: PHQ-2 Total Score: 5  PHQ-9: PHQ-9: Brief Depression Severity Measure Score: 19  Opioid Risk Tool:         Review of Systems:   ROS negative except as otherwise noted     Past Medical History:   Past Medical History:   Diagnosis Date    Anxiety     Bipolar depression     Depression     Diabetes mellitus     Diabetic amyotrophy associated with type 2 diabetes mellitus     Hypertension     Leaky heart valve     Sleep apnea          Past Surgical History:   History reviewed. No pertinent surgical history.      Family History   Family History   Problem Relation Age of Onset    Diabetes Maternal Grandmother          Social History   Social History     Socioeconomic History    Marital status:     Tobacco Use    Smoking status: Some Days     Types: Cigars    Smokeless tobacco: Never   Vaping Use    Vaping Use: Never used   Substance and Sexual Activity    Alcohol use: Never    Drug use: Never    Sexual activity: Defer        Medications:     Current Outpatient Medications:     amLODIPine (NORVASC) 5 MG tablet, Take 1 tablet by mouth Daily., Disp: , Rfl:     atorvastatin (LIPITOR) 80 MG tablet, Take 1 tablet by mouth Daily., Disp: , Rfl:     BD Pen Needle Micro U/F 32G X 6 MM misc, USE 1 NEEDLE ONCE DAILY, Disp: , Rfl:     Continuous Blood Gluc Sensor (Dexcom G6 Sensor), USE ONE SENSOR WITH TRANSMITTER EVERY 10 DAYS, Disp: , Rfl:     Continuous Blood Gluc Transmit (Dexcom G6 Transmitter) misc, APPLY 1 MONTHLY TO ABDOMEN, Disp: , Rfl:     cyclobenzaprine (FLEXERIL) 10 MG tablet, Take 1 tablet by mouth 3 (Three) Times a Day As Needed for Muscle Spasms., Disp: 12 tablet, Rfl: 0    diclofenac (VOLTAREN) 50 MG EC tablet, Take 1 tablet by mouth 3 (Three) Times a Day., Disp: 15 tablet, Rfl: 0    divalproex (DEPAKOTE) 500 MG DR tablet, Take 1 tablet by mouth 3 (Three) Times a Day., Disp: , Rfl:     hydroCHLOROthiazide (HYDRODIURIL) 25 MG tablet, Take 1 tablet by mouth Daily., Disp: , Rfl:     Hydrocort-Pramoxine, Perianal, (PROCTOFOAM-HS) 1-1 % rectal foam, Insert 1 application into the rectum 2 (Two) Times a Day., Disp: 10 g, Rfl: 0    Levemir FlexTouch 100 UNIT/ML injection, Inject 15 Units under the skin into the appropriate area as directed Daily., Disp: , Rfl:     lisinopril (PRINIVIL,ZESTRIL) 40 MG tablet, Daily., Disp: , Rfl:     meclizine (ANTIVERT) 25 MG tablet, Take 1 tablet by mouth 3 (Three) Times a Day As Needed for Dizziness., Disp: 12 tablet, Rfl: 0    metFORMIN (GLUCOPHAGE) 500 MG tablet, Take 1 tablet by mouth 2 (Two) Times a Day With Meals., Disp: 180 tablet, Rfl: 1    metoprolol succinate XL (TOPROL-XL) 25 MG 24 hr tablet, Take 1 tablet by mouth Daily., Disp: , Rfl:     metoprolol tartrate  "(LOPRESSOR) 25 MG tablet, Take 1 tablet by mouth 2 (Two) Times a Day., Disp: , Rfl:     prazosin (MINIPRESS) 2 MG capsule, Take 1 capsule by mouth Every Night., Disp: , Rfl:     PROPRANOLOL HCL PO, Take  by mouth., Disp: , Rfl:     QUEtiapine (SEROquel) 100 MG tablet, Take 1 tablet by mouth Every Night., Disp: , Rfl:     ranolazine (RANEXA) 500 MG 12 hr tablet, Take 1 tablet by mouth 2 (Two) Times a Day., Disp: , Rfl:     Semaglutide, 1 MG/DOSE, (OZEMPIC) 2 MG/1.5ML solution pen-injector, Inject  under the skin into the appropriate area as directed 1 (One) Time Per Week., Disp: , Rfl:     sertraline (ZOLOFT) 100 MG tablet, Take 1 tablet by mouth Daily., Disp: , Rfl:     sertraline (ZOLOFT) 100 MG tablet, Daily., Disp: , Rfl:         Physical Exam:     Vitals:    06/08/23 1230   BP: 136/92   BP Location: Left arm   Patient Position: Sitting   Cuff Size: Adult   Pulse: 85   Resp: 14   Temp: 96.3 °F (35.7 °C)   TempSrc: Infrared   SpO2: 96%   Weight: 105 kg (231 lb 3.2 oz)   Height: 167.6 cm (66\")   PainSc:   9   PainLoc: Back        General: Alert and oriented, No acute distress.   HEENT: Normocephalic, atraumatic.   Cardiovascular: No gross edema  Respiratory: Respirations are non-labored    Lumbar Spine:   No masses or atrophy  Range of motion - Flexion normal. Extension normal. Right Lat Bending normal. Left Lat Bending normal  Facet Loading: Negative bilaterally  Facet Palpation - Nontender   PSIS tenderness - Negative bilaterally  Sav's/JOE/Thigh thrust - Negative bilaterally  Straight leg raise: Positive left  Slump test: Positive left    Motor Exam:    Strength: Rate on 1-5 scale Right Left    L1/2- hip flexion 5 5    L3- knee extension 5 5    L4- ankle dorsiflexion 5 5    L5- great toe extension 5 5    S1- ankle plantarflexion 5 5    Sensory Exam: Full and equal sensation to pinprick throughout.    DTR’s Right Left    Biceps (C5) 2+ 2+    Brachioradialis (C6) 2+ 2+    Triceps (C7) 2+ 2+    Knee (L2-4) 2+ " "2+    Ankle (S1) 2+ 2+    Neurologic: Cranial Nerves II-XII are grossly intact.   Clonus -negative bilaterally    Psychiatric: Cooperative.   Gait: Antalgic  Assistive Devices: Straight cane    Imaging Studies:   No results found for this or any previous visit.      Impression & Plan:   Mr. Olivier Swift is a 43 y.o. male with past medical history significant for anxiety, depression, bipolar, DM, HTN, COPD who presents to the pain clinic for evaluation and treatment of chronic low back pain with radiation to left lower extremity.  Do not have images for personal review however lumbar MRI report details severe L4/5 canal stenosis.  Dr. Dietrich's interpretation: \"central canal stenosis with I suspect a left eccentric disc bulge.\"  Clinical examination consistent with lumbar spinal stenosis with neurogenic medication as well as lumbar radiculopathy.  We discussed epidural steroid injection to improve pain.  If greater than 50% relief for at least 2-3 months can consider repeat as needed every 3 to 4 months.  I had an in-depth discussion with the patient regarding the risks of the procedure including bleeding, infection, damage to surrounding structures, paralysis.  We discussed the potential adverse effects of corticosteroid injection including flushing of the face, lipodystrophy, skin discoloration, elevated blood glucose, increased blood pressure.  Risks of frequent steroid administration include weight gain, hormonal changes, mood changes, osteoporosis.  If no benefit from interlaminar epidural can consider left-sided transforaminal.      1. Lumbar stenosis with neurogenic claudication        PLAN:  1. Medication Recommendations: Recommend Voltaren topical, NSAIDs, Tylenol.  Can trial turmeric 500 mg twice daily if NSAID contraindicated.  Consider gabapentin    2. Physical Therapy: Continue HEP    3. Psychological: defer    4. Complementary and alternative (CAM) Therapies:     5. Labs: None indicated     6. Imaging: " MRI report reviewed    7. Interventions: Schedule left paramedian L3/4 interlaminar epidural steroid injection (80772).  Can consider left-sided transforaminal epidural if interlaminar not beneficial.    8. Referrals: None indicated     9. Records requested: n/a    10. Lifestyle goals:     follow-up 1 month after injection      [facility]  Ravi Montes MD

## 2023-08-09 ENCOUNTER — OFFICE VISIT (OUTPATIENT)
Dept: PAIN MEDICINE | Facility: CLINIC | Age: 43
End: 2023-08-09
Payer: COMMERCIAL

## 2023-08-09 VITALS
TEMPERATURE: 96.6 F | HEIGHT: 66 IN | DIASTOLIC BLOOD PRESSURE: 84 MMHG | WEIGHT: 231 LBS | HEART RATE: 86 BPM | SYSTOLIC BLOOD PRESSURE: 130 MMHG | BODY MASS INDEX: 37.12 KG/M2 | OXYGEN SATURATION: 96 %

## 2023-08-09 DIAGNOSIS — M48.062 LUMBAR STENOSIS WITH NEUROGENIC CLAUDICATION: Primary | ICD-10-CM

## 2023-08-09 PROCEDURE — 1159F MED LIST DOCD IN RCRD: CPT | Performed by: STUDENT IN AN ORGANIZED HEALTH CARE EDUCATION/TRAINING PROGRAM

## 2023-08-09 PROCEDURE — 1160F RVW MEDS BY RX/DR IN RCRD: CPT | Performed by: STUDENT IN AN ORGANIZED HEALTH CARE EDUCATION/TRAINING PROGRAM

## 2023-08-09 PROCEDURE — 99213 OFFICE O/P EST LOW 20 MIN: CPT | Performed by: STUDENT IN AN ORGANIZED HEALTH CARE EDUCATION/TRAINING PROGRAM

## 2023-08-09 PROCEDURE — 1125F AMNT PAIN NOTED PAIN PRSNT: CPT | Performed by: STUDENT IN AN ORGANIZED HEALTH CARE EDUCATION/TRAINING PROGRAM

## 2023-08-09 RX ORDER — LIDOCAINE 50 MG/G
1 PATCH TOPICAL AS NEEDED
COMMUNITY
Start: 2023-08-03

## 2023-08-09 NOTE — PROGRESS NOTES
Primary Physician: Pastora Carbajal APRN    CHIEF COMPLAINT or REASON FOR VISIT: Follow-up (Steroid shot follow up. The pain had gotten worse since then.)    Initial HPI 6/8/2023:  Mr. Olivier Swift is 43 y.o. male who presents as a new patient referral for evaluation treatment of left sided low back pain with radiation of the extremity.  He states that he has had this issue for decades however it has been worsening over the last year or so without any inciting event or trauma.  He has never had any spine surgeries or interventions.  He describes numbness tingling and pain left side of his low back radiating down the lateral aspect of his thigh and leg into the foot.  He does have some subjective weakness in his left leg.  He uses a straight cane for this issue.  He has tried physical therapy which was not beneficial as well as NSAIDs and muscle relaxers.  He recently saw neurosurgery, Dr. Jesus Dietrich MD, who recommended interventional pain management therapies and if pain continues he would likely offer surgery.    Interval history: Patient returns to clinic after undergoing a left paramedian L3/4 epidural steroid injection without any benefit.  He continues to have significant pain, would like to move forward with surgery.    Interventions:  6/27/2023:Left paramedian L3/4 LESI with     Objective Pain Scoring:   BRIEF PAIN INVENTORY:  Total score:   Pain Score    08/09/23 1110   PainSc:   9   PainLoc: Back      PHQ-2: PHQ-2 Total Score: 4  PHQ-9: PHQ-9: Brief Depression Severity Measure Score: 20  Opioid Risk Tool:         Review of Systems:   ROS negative except as otherwise noted     Past Medical History:   Past Medical History:   Diagnosis Date    Anxiety     Bipolar depression     Depression     Diabetes mellitus     Diabetic amyotrophy associated with type 2 diabetes mellitus     Hypertension     Leaky heart valve     Sleep apnea          Past Surgical History:   History reviewed. No pertinent surgical  history.      Family History   Family History   Problem Relation Age of Onset    Diabetes Maternal Grandmother          Social History   Social History     Socioeconomic History    Marital status:    Tobacco Use    Smoking status: Some Days     Types: Cigars    Smokeless tobacco: Never   Vaping Use    Vaping Use: Never used   Substance and Sexual Activity    Alcohol use: Never    Drug use: Never    Sexual activity: Defer        Medications:     Current Outpatient Medications:     amLODIPine (NORVASC) 5 MG tablet, Take 1 tablet by mouth Daily., Disp: , Rfl:     atorvastatin (LIPITOR) 80 MG tablet, Take 1 tablet by mouth Daily., Disp: , Rfl:     BD Pen Needle Micro U/F 32G X 6 MM misc, USE 1 NEEDLE ONCE DAILY, Disp: , Rfl:     Continuous Blood Gluc Sensor (Dexcom G6 Sensor), USE ONE SENSOR WITH TRANSMITTER EVERY 10 DAYS, Disp: , Rfl:     Continuous Blood Gluc Transmit (Dexcom G6 Transmitter) misc, APPLY 1 MONTHLY TO ABDOMEN, Disp: , Rfl:     cyclobenzaprine (FLEXERIL) 10 MG tablet, Take 1 tablet by mouth 3 (Three) Times a Day As Needed for Muscle Spasms., Disp: 12 tablet, Rfl: 0    diclofenac (VOLTAREN) 50 MG EC tablet, Take 1 tablet by mouth 3 (Three) Times a Day., Disp: 15 tablet, Rfl: 0    divalproex (DEPAKOTE) 500 MG DR tablet, Take 1 tablet by mouth 3 (Three) Times a Day., Disp: , Rfl:     hydroCHLOROthiazide (HYDRODIURIL) 25 MG tablet, Take 1 tablet by mouth Daily., Disp: , Rfl:     Levemir FlexTouch 100 UNIT/ML injection, Inject 15 Units under the skin into the appropriate area as directed Daily., Disp: , Rfl:     lidocaine (LIDODERM) 5 %, 1 patch As Needed., Disp: , Rfl:     lisinopril (PRINIVIL,ZESTRIL) 40 MG tablet, Daily., Disp: , Rfl:     meclizine (ANTIVERT) 25 MG tablet, Take 1 tablet by mouth 3 (Three) Times a Day As Needed for Dizziness., Disp: 12 tablet, Rfl: 0    metFORMIN (GLUCOPHAGE) 500 MG tablet, Take 1 tablet by mouth 2 (Two) Times a Day With Meals., Disp: 180 tablet, Rfl: 1    metoprolol  "succinate XL (TOPROL-XL) 25 MG 24 hr tablet, Take 1 tablet by mouth Daily., Disp: , Rfl:     prazosin (MINIPRESS) 2 MG capsule, Take 1 capsule by mouth Every Night., Disp: , Rfl:     PROPRANOLOL HCL PO, Take  by mouth., Disp: , Rfl:     QUEtiapine (SEROquel) 100 MG tablet, Take 1 tablet by mouth Every Night., Disp: , Rfl:     ranolazine (RANEXA) 500 MG 12 hr tablet, Take 1 tablet by mouth 2 (Two) Times a Day., Disp: , Rfl:     Semaglutide, 1 MG/DOSE, (OZEMPIC) 2 MG/1.5ML solution pen-injector, Inject  under the skin into the appropriate area as directed 1 (One) Time Per Week., Disp: , Rfl:     sertraline (ZOLOFT) 100 MG tablet, Take 1 tablet by mouth Daily., Disp: , Rfl:     Hydrocort-Pramoxine, Perianal, (PROCTOFOAM-HS) 1-1 % rectal foam, Insert 1 application into the rectum 2 (Two) Times a Day. (Patient not taking: Reported on 8/9/2023), Disp: 10 g, Rfl: 0    metoprolol tartrate (LOPRESSOR) 25 MG tablet, Take 1 tablet by mouth 2 (Two) Times a Day. (Patient not taking: Reported on 8/9/2023), Disp: , Rfl:     sertraline (ZOLOFT) 100 MG tablet, Daily. (Patient not taking: Reported on 8/9/2023), Disp: , Rfl:         Physical Exam:     Vitals:    08/09/23 1110   BP: 130/84   BP Location: Left arm   Patient Position: Sitting   Cuff Size: Adult   Pulse: 86   Temp: 96.6 øF (35.9 øC)   TempSrc: Infrared   SpO2: 96%   Weight: 105 kg (231 lb)   Height: 167.6 cm (66\")   PainSc:   9   PainLoc: Back        General: Alert and oriented, No acute distress.   HEENT: Normocephalic, atraumatic.   Cardiovascular: No gross edema  Respiratory: Respirations are non-labored    Lumbar Spine:   No masses or atrophy  Range of motion - Flexion normal. Extension normal. Right Lat Bending normal. Left Lat Bending normal  Facet Loading: Negative bilaterally  Facet Palpation - Nontender   PSIS tenderness - Negative bilaterally  Sav's/JOE/Thigh thrust - Negative bilaterally  Straight leg raise: Positive left  Slump test: Positive " "left    Motor Exam:    Strength: Rate on 1-5 scale Right Left    L1/2- hip flexion 5 5    L3- knee extension 5 5    L4- ankle dorsiflexion 5 5    L5- great toe extension 5 5    S1- ankle plantarflexion 5 5    Sensory Exam: Full and equal sensation to pinprick throughout.    DTR's Right Left    Biceps (C5) 2+ 2+    Brachioradialis (C6) 2+ 2+    Triceps (C7) 2+ 2+    Knee (L2-4) 2+ 2+    Ankle (S1) 2+ 2+    Neurologic: Cranial Nerves II-XII are grossly intact.   Clonus -negative bilaterally    Psychiatric: Cooperative.   Gait: Antalgic  Assistive Devices: Straight cane    Imaging Studies:   No results found for this or any previous visit.      Impression & Plan:   6/8/2023: Olivier Swift is a 43 y.o. male with past medical history significant for anxiety, depression, bipolar, DM, HTN, COPD who presents to the pain clinic for evaluation and treatment of chronic low back pain with radiation to left lower extremity.  Do not have images for personal review however lumbar MRI report details severe L4/5 canal stenosis.  Dr. Dietrich's interpretation: \"central canal stenosis with I suspect a left eccentric disc bulge.\"  Clinical examination consistent with lumbar spinal stenosis with neurogenic medication as well as lumbar radiculopathy.  We discussed epidural steroid injection to improve pain.  If greater than 50% relief for at least 2-3 months can consider repeat as needed every 3 to 4 months.  I had an in-depth discussion with the patient regarding the risks of the procedure including bleeding, infection, damage to surrounding structures, paralysis.  We discussed the potential adverse effects of corticosteroid injection including flushing of the face, lipodystrophy, skin discoloration, elevated blood glucose, increased blood pressure.  Risks of frequent steroid administration include weight gain, hormonal changes, mood changes, osteoporosis.  If no benefit from interlaminar epidural can consider left-sided " transforaminal.  8/9/2023: No benefit from epidural steroid injection.  Can contemplate a transforaminal approach; patient would rather move forward with surgery therefore we will send back to Dr. Dietrich.      1. Lumbar stenosis with neurogenic claudication          PLAN:  1. Medication Recommendations: Recommend Voltaren topical, NSAIDs, Tylenol.  Can trial turmeric 500 mg twice daily if NSAID contraindicated.  Consider gabapentin    2. Physical Therapy: Continue HEP    3. Psychological: defer    4. Complementary and alternative (CAM) Therapies:     5. Labs: None indicated     6. Imaging: None indicated    7. Interventions: None    8. Referrals: Referral back to neurosurgery    9. Records requested: n/a    10. Lifestyle goals:     follow-up as needed      [facility]  Ravi Montes MD

## 2023-09-13 ENCOUNTER — HOSPITAL ENCOUNTER (EMERGENCY)
Facility: HOSPITAL | Age: 43
Discharge: HOME | End: 2023-09-13
Attending: EMERGENCY MEDICINE
Payer: COMMERCIAL

## 2023-09-13 ENCOUNTER — APPOINTMENT (OUTPATIENT)
Dept: CT IMAGING | Facility: HOSPITAL | Age: 43
End: 2023-09-13
Payer: COMMERCIAL

## 2023-09-13 VITALS
TEMPERATURE: 98.2 F | RESPIRATION RATE: 16 BRPM | HEIGHT: 65 IN | HEART RATE: 74 BPM | BODY MASS INDEX: 36.65 KG/M2 | DIASTOLIC BLOOD PRESSURE: 80 MMHG | OXYGEN SATURATION: 95 % | SYSTOLIC BLOOD PRESSURE: 117 MMHG | WEIGHT: 220 LBS

## 2023-09-13 DIAGNOSIS — R10.9 NONSPECIFIC ABDOMINAL PAIN: Primary | ICD-10-CM

## 2023-09-13 DIAGNOSIS — Z86.79 HISTORY OF HYPERTENSION: ICD-10-CM

## 2023-09-13 DIAGNOSIS — Z87.39 HISTORY OF DEGENERATIVE DISC DISEASE: ICD-10-CM

## 2023-09-13 DIAGNOSIS — Z86.39 HISTORY OF DIABETES MELLITUS: ICD-10-CM

## 2023-09-13 LAB
ALBUMIN SERPL-MCNC: 4.3 G/DL (ref 3.5–5.2)
ALBUMIN/GLOB SERPL: 1.3 G/DL
ALP SERPL-CCNC: 41 U/L (ref 39–117)
ALT SERPL W P-5'-P-CCNC: 11 U/L (ref 1–41)
ANION GAP SERPL CALCULATED.3IONS-SCNC: 9 MMOL/L (ref 5–15)
AST SERPL-CCNC: 14 U/L (ref 1–40)
BASOPHILS # BLD AUTO: 0.03 10*3/MM3 (ref 0–0.2)
BASOPHILS NFR BLD AUTO: 0.4 % (ref 0–1.5)
BILIRUB SERPL-MCNC: 0.3 MG/DL (ref 0–1.2)
BILIRUB UR QL STRIP: NEGATIVE
BUN SERPL-MCNC: 14 MG/DL (ref 6–20)
BUN/CREAT SERPL: 11.6 (ref 7–25)
CALCIUM SPEC-SCNC: 9.5 MG/DL (ref 8.6–10.5)
CHLORIDE SERPL-SCNC: 105 MMOL/L (ref 98–107)
CLARITY UR: CLEAR
CO2 SERPL-SCNC: 31 MMOL/L (ref 22–29)
COLOR UR: YELLOW
CREAT SERPL-MCNC: 1.21 MG/DL (ref 0.76–1.27)
D-LACTATE SERPL-SCNC: 0.7 MMOL/L (ref 0.5–2)
DEPRECATED RDW RBC AUTO: 48.2 FL (ref 37–54)
EGFRCR SERPLBLD CKD-EPI 2021: 76.7 ML/MIN/1.73
EOSINOPHIL # BLD AUTO: 0.02 10*3/MM3 (ref 0–0.4)
EOSINOPHIL NFR BLD AUTO: 0.2 % (ref 0.3–6.2)
ERYTHROCYTE [DISTWIDTH] IN BLOOD BY AUTOMATED COUNT: 14.2 % (ref 12.3–15.4)
GLOBULIN UR ELPH-MCNC: 3.3 GM/DL
GLUCOSE SERPL-MCNC: 107 MG/DL (ref 65–99)
GLUCOSE UR STRIP-MCNC: NEGATIVE MG/DL
HCT VFR BLD AUTO: 46.9 % (ref 37.5–51)
HGB BLD-MCNC: 15.1 G/DL (ref 13–17.7)
HGB UR QL STRIP.AUTO: NEGATIVE
HOLD SPECIMEN: NORMAL
HOLD SPECIMEN: NORMAL
IMM GRANULOCYTES # BLD AUTO: 0.02 10*3/MM3 (ref 0–0.05)
IMM GRANULOCYTES NFR BLD AUTO: 0.2 % (ref 0–0.5)
KETONES UR QL STRIP: ABNORMAL
LEUKOCYTE ESTERASE UR QL STRIP.AUTO: NEGATIVE
LIPASE SERPL-CCNC: 58 U/L (ref 13–60)
LYMPHOCYTES # BLD AUTO: 3.41 10*3/MM3 (ref 0.7–3.1)
LYMPHOCYTES NFR BLD AUTO: 41.1 % (ref 19.6–45.3)
MCH RBC QN AUTO: 29.7 PG (ref 26.6–33)
MCHC RBC AUTO-ENTMCNC: 32.2 G/DL (ref 31.5–35.7)
MCV RBC AUTO: 92.3 FL (ref 79–97)
MONOCYTES # BLD AUTO: 0.57 10*3/MM3 (ref 0.1–0.9)
MONOCYTES NFR BLD AUTO: 6.9 % (ref 5–12)
NEUTROPHILS NFR BLD AUTO: 4.25 10*3/MM3 (ref 1.7–7)
NEUTROPHILS NFR BLD AUTO: 51.2 % (ref 42.7–76)
NITRITE UR QL STRIP: NEGATIVE
NRBC BLD AUTO-RTO: 0 /100 WBC (ref 0–0.2)
PH UR STRIP.AUTO: 5.5 [PH] (ref 5–8)
PLATELET # BLD AUTO: 218 10*3/MM3 (ref 140–450)
PMV BLD AUTO: 13 FL (ref 6–12)
POTASSIUM SERPL-SCNC: 4 MMOL/L (ref 3.5–5.2)
PROT SERPL-MCNC: 7.6 G/DL (ref 6–8.5)
PROT UR QL STRIP: NEGATIVE
RBC # BLD AUTO: 5.08 10*6/MM3 (ref 4.14–5.8)
SODIUM SERPL-SCNC: 145 MMOL/L (ref 136–145)
SP GR UR STRIP: 1.03 (ref 1–1.03)
UROBILINOGEN UR QL STRIP: ABNORMAL
WBC NRBC COR # BLD: 8.3 10*3/MM3 (ref 3.4–10.8)
WHOLE BLOOD HOLD COAG: NORMAL
WHOLE BLOOD HOLD SPECIMEN: NORMAL

## 2023-09-13 PROCEDURE — 25510000001 IOPAMIDOL 61 % SOLUTION: Performed by: EMERGENCY MEDICINE

## 2023-09-13 PROCEDURE — 83690 ASSAY OF LIPASE: CPT | Performed by: PHYSICIAN ASSISTANT

## 2023-09-13 PROCEDURE — 99285 EMERGENCY DEPT VISIT HI MDM: CPT

## 2023-09-13 PROCEDURE — 74177 CT ABD & PELVIS W/CONTRAST: CPT

## 2023-09-13 PROCEDURE — 85025 COMPLETE CBC W/AUTO DIFF WBC: CPT | Performed by: PHYSICIAN ASSISTANT

## 2023-09-13 PROCEDURE — 81003 URINALYSIS AUTO W/O SCOPE: CPT | Performed by: PHYSICIAN ASSISTANT

## 2023-09-13 PROCEDURE — 80053 COMPREHEN METABOLIC PANEL: CPT | Performed by: PHYSICIAN ASSISTANT

## 2023-09-13 PROCEDURE — 83605 ASSAY OF LACTIC ACID: CPT | Performed by: PHYSICIAN ASSISTANT

## 2023-09-13 RX ORDER — SODIUM CHLORIDE 0.9 % (FLUSH) 0.9 %
10 SYRINGE (ML) INJECTION AS NEEDED
Status: DISCONTINUED | OUTPATIENT
Start: 2023-09-13 | End: 2023-09-14 | Stop reason: HOSPADM

## 2023-09-13 RX ADMIN — IOPAMIDOL 85 ML: 612 INJECTION, SOLUTION INTRAVENOUS at 21:43

## 2023-09-14 LAB — HOLD SPECIMEN: NORMAL

## 2023-09-14 NOTE — ED PROVIDER NOTES
" EMERGENCY DEPARTMENT ENCOUNTER    Pt Name: Olivier Swift  MRN: [id number]  Pt :   1980  Room Number:    Date of encounter:  2023  PCP: Pastora Carbajal APRN  ED Provider: ZEE Cuellar    Historian: Patient    HPI:  Chief Complaint: Right-sided abdominal pain    Context: Olivier Swift is a 43 y.o. male who presents to the ED c/o right-sided abdominal pain and abdominal distention on his right side.  Patient was seen and evaluated at urgent care today where there was concern for patient's symptoms and possible correlation to appendicitis.  Patient was instructed to present to the ER for further evaluation.  Patient reports that he has been experiencing his symptoms for approximately the last 5 days but that they have worsened over the last 3 days.  Shares that he has noticed that his \"right side is bigger than his left\".  Has had symptoms of nausea, vomiting and diarrhea.  He reports no urinary complaints.  Patient is currently prescribed Ozempic which he states helped to decrease his appetite.  No significant past abdominal surgeries.  Patient does report that he currently has an inguinal hernia he is waiting to have repaired.  No additional complaints on interview and exam.  HPI     REVIEW OF SYSTEMS  A chief complaint appropriate review of systems was completed and is negative except as noted in the HPI.     PAST MEDICAL HISTORY  Past Medical History:   Diagnosis Date    Anxiety     Bipolar depression     Depression     Diabetes mellitus     Diabetic amyotrophy associated with type 2 diabetes mellitus     Hypertension     Leaky heart valve     Sleep apnea        PAST SURGICAL HISTORY  No past surgical history on file.    FAMILY HISTORY  Family History   Problem Relation Age of Onset    Diabetes Maternal Grandmother        SOCIAL HISTORY  Social History     Socioeconomic History    Marital status:    Tobacco Use    Smoking status: Some Days     Types: Cigars     Passive exposure: " Never    Smokeless tobacco: Never   Vaping Use    Vaping Use: Never used   Substance and Sexual Activity    Alcohol use: Never    Drug use: Never    Sexual activity: Defer       ALLERGIES  Trazodone    PHYSICAL EXAM  Physical Exam  GENERAL:   Appears in no acute distress.   HENT: Nares patent.  EYES: No scleral icterus.  CV: Regular rhythm, regular rate.  RESPIRATORY: Normal effort.  No audible wheezes, rales or rhonchi.  ABDOMEN: Soft, diffuse lower abdominal tenderness. No evidence of acute abdomen on physical exam. No rebound or guarding. No peritoneal signs.   MUSCULOSKELETAL: No deformities.   NEURO: Alert, moves all extremities, follows commands.  SKIN: Warm, dry, no rash visualized.  I have reviewed the triage vital signs and nursing notes.      LAB RESULTS  Results for orders placed or performed during the hospital encounter of 09/13/23   Comprehensive Metabolic Panel    Specimen: Blood   Result Value Ref Range    Glucose 107 (H) 65 - 99 mg/dL    BUN 14 6 - 20 mg/dL    Creatinine 1.21 0.76 - 1.27 mg/dL    Sodium 145 136 - 145 mmol/L    Potassium 4.0 3.5 - 5.2 mmol/L    Chloride 105 98 - 107 mmol/L    CO2 31.0 (H) 22.0 - 29.0 mmol/L    Calcium 9.5 8.6 - 10.5 mg/dL    Total Protein 7.6 6.0 - 8.5 g/dL    Albumin 4.3 3.5 - 5.2 g/dL    ALT (SGPT) 11 1 - 41 U/L    AST (SGOT) 14 1 - 40 U/L    Alkaline Phosphatase 41 39 - 117 U/L    Total Bilirubin 0.3 0.0 - 1.2 mg/dL    Globulin 3.3 gm/dL    A/G Ratio 1.3 g/dL    BUN/Creatinine Ratio 11.6 7.0 - 25.0    Anion Gap 9.0 5.0 - 15.0 mmol/L    eGFR 76.7 >60.0 mL/min/1.73   Lipase    Specimen: Blood   Result Value Ref Range    Lipase 58 13 - 60 U/L   Urinalysis With Microscopic If Indicated (No Culture) - Urine, Clean Catch    Specimen: Urine, Clean Catch   Result Value Ref Range    Color, UA Yellow Yellow, Straw    Appearance, UA Clear Clear    pH, UA 5.5 5.0 - 8.0    Specific Gravity, UA 1.027 1.001 - 1.030    Glucose, UA Negative Negative    Ketones, UA Trace (A)  Negative    Bilirubin, UA Negative Negative    Blood, UA Negative Negative    Protein, UA Negative Negative    Leuk Esterase, UA Negative Negative    Nitrite, UA Negative Negative    Urobilinogen, UA 0.2 E.U./dL 0.2 - 1.0 E.U./dL   Lactic Acid, Plasma    Specimen: Blood   Result Value Ref Range    Lactate 0.7 0.5 - 2.0 mmol/L   CBC Auto Differential    Specimen: Blood   Result Value Ref Range    WBC 8.30 3.40 - 10.80 10*3/mm3    RBC 5.08 4.14 - 5.80 10*6/mm3    Hemoglobin 15.1 13.0 - 17.7 g/dL    Hematocrit 46.9 37.5 - 51.0 %    MCV 92.3 79.0 - 97.0 fL    MCH 29.7 26.6 - 33.0 pg    MCHC 32.2 31.5 - 35.7 g/dL    RDW 14.2 12.3 - 15.4 %    RDW-SD 48.2 37.0 - 54.0 fl    MPV 13.0 (H) 6.0 - 12.0 fL    Platelets 218 140 - 450 10*3/mm3    Neutrophil % 51.2 42.7 - 76.0 %    Lymphocyte % 41.1 19.6 - 45.3 %    Monocyte % 6.9 5.0 - 12.0 %    Eosinophil % 0.2 (L) 0.3 - 6.2 %    Basophil % 0.4 0.0 - 1.5 %    Immature Grans % 0.2 0.0 - 0.5 %    Neutrophils, Absolute 4.25 1.70 - 7.00 10*3/mm3    Lymphocytes, Absolute 3.41 (H) 0.70 - 3.10 10*3/mm3    Monocytes, Absolute 0.57 0.10 - 0.90 10*3/mm3    Eosinophils, Absolute 0.02 0.00 - 0.40 10*3/mm3    Basophils, Absolute 0.03 0.00 - 0.20 10*3/mm3    Immature Grans, Absolute 0.02 0.00 - 0.05 10*3/mm3    nRBC 0.0 0.0 - 0.2 /100 WBC   Green Top (Gel)   Result Value Ref Range    Extra Tube Hold for add-ons.    Lavender Top   Result Value Ref Range    Extra Tube hold for add-on    Gold Top - SST   Result Value Ref Range    Extra Tube Hold for add-ons.    Gray Top   Result Value Ref Range    Extra Tube Hold for add-ons.    Light Blue Top   Result Value Ref Range    Extra Tube Hold for add-ons.        If labs were ordered, I independently reviewed the results and considered them in treating the patient.    RADIOLOGY  CT Abdomen Pelvis With Contrast   Final Result   Impression:      No evidence of acute abnormality within the abdomen or pelvis.      Electronically Signed: Jordan Castro MD      9/13/2023 10:04 PM EDT     Workstation ID: [id number]        [x] Radiologist's Report Reviewed:  I ordered and independently reviewed the above noted radiographic studies.  See radiologist's dictation for official interpretation.      PROCEDURES    Procedures    No orders to display       MEDICATIONS GIVEN IN ER    Medications   iopamidol (ISOVUE-300) 61 % injection 100 mL (85 mL Intravenous Given 9/13/23 2143)       MEDICAL DECISION MAKING, PROGRESS, and CONSULTS   Medical Decision Making  Problems Addressed:  History of degenerative disc disease: chronic illness or injury  History of diabetes mellitus: chronic illness or injury  History of hypertension: chronic illness or injury  Nonspecific abdominal pain: complicated acute illness or injury    Amount and/or Complexity of Data Reviewed  Labs: ordered.  Radiology: ordered.    Risk  Prescription drug management.        All labs have been independently reviewed by me.  All radiology studies have been reviewed by me and the radiologist dictating the report.  All EKG's have been independently viewed by me.    [] Discussed with radiology regarding test interpretation:    Discussion below represents my analysis of pertinent findings related to patient's condition, differential diagnosis, treatment plan and final disposition.    Differential diagnosis:  The differential diagnosis associated with the patient's presentation includes: Dyspepsia, viral gastroenteritis, constipation, medication side effects, psychiatric illness, irritable bowel syndrome, abdominal wall pain, gallbladder disease, pancreatitis, diverticulitis, appendicitis, kidney stone, UTI, hernia, gastroparesis, food poisoning, DKA, hepatitis, bowel obstruction, colitis and others.      Additional sources  Discussed/ obtained information from independent historians:   [] Spouse  [] Parent  [] Family member  [] Friend  [] EMS   [] Other:  External (non-ED) record review:   [] Inpatient record:   [] Office  record:   [] Outpatient record:   [] Prior Outpatient labs:   [] Prior Outpatient radiology:   [] Primary Care record:   [] Outside ED record:   [x] Other: Personally reviewed urgent care visit with diagnosis of right lower quadrant abdominal pain with recommendation made to present to the ER for further evaluation and rule out appendicitis  Patient's care impacted by:   [x] Diabetes  [x] Hypertension  [] Hyperlipidemia  [] Hypothyroidism   [] Coronary Artery Disease   [] COPD   [] Cancer   [] Obesity  [] GERD   [] Tobacco Abuse   [] Substance Abuse    [] Anxiety   [] Depression   [x] Other: History of lumbar stenosis with neurogenic claudication, degenerative disc disease  Care significantly affected by Social Determinants of Health (housing and economic circumstances, unemployment)    [] Yes     [x] No   If yes, Patient's care significantly limited by  Social Determinants of Health including:   [] Inadequate housing   [] Low income   [] Alcoholism and drug addiction in family   [] Problems related to primary support group   [] Unemployment   [] Problems related to employment   [] Other Social Determinants of Health:     Shared decision making:  I had a discussion with the patient/family regarding diagnosis, diagnostic results, treatment plan, and medications.  The patient/family indicated understanding of these instructions.  I spent adequate time at the bedside preceding discharge necessary to personally discuss the aftercare instructions, giving patient education, providing explanations of the results of our evaluations/findings, and my decision making to assure that the patient/family understand the plan of care.  Time was allotted to answer questions at that time and throughout the ED course.  Emphasis was placed on timely follow-up after discharge.  I also discussed the potential for the development of an acute emergent condition requiring further evaluation, admission, or even surgical intervention. I  discussed that we found nothing during the visit today indicating the need for further workup, admission, or the presence of an unstable medical condition.  I encouraged the patient to return to the emergency department immediately for ANY concerns, worsening, new complaints, or if symptoms persist and unable to seek follow-up in a timely fashion.  The patient/family expressed understanding and agreement with this plan.  The patient will follow-up with primary care and gastroenterology for reevaluation.      Orders placed during this visit:  Orders Placed This Encounter   Procedures    CT Abdomen Pelvis With Contrast    [place] Draw    Comprehensive Metabolic Panel    Lipase    Urinalysis With Microscopic If Indicated (No Culture) - Urine, Clean Catch    Lactic Acid, Plasma    CBC Auto Differential    Green Top (Gel)    Lavender Top    Gold Top - SST    Gray Top    Light Blue Top    CBC & Differential       I considered prescription management  with:   [x] Pain medication: The use of prescription pain medication was considered in this patient however not implemented as risks outweigh benefits of prescription pain management and it was felt patient's symptoms could be managed with OTC anti-inflammatory medications and Tylenol.  [] Antiviral  [x] Antibiotic: Clinical presentation and ER workup without evidence of bacterial infection requiring treatment with antibiotics.    [] Other:   Rationale:      ED Course:    ED Course as of 09/18/23 1938   Wed Sep 13, 2023   4817 Patient presents to ED with complaints of abdominal pain. Abdominal exam without peritoneal signs. No evidence of acute abdomen at this time. Well appearing. Low suspicion for acute hepatobiliary disease (includng acute cholecystitis), acute pancreatitis, PUD (including perforation), acute infectious processes (pneumonia, hepatitis, pyelonephritis), acute appendicitis, bowel obstruction or viscus perforation. Presentation not consistent with other  acute, emergent causes of abdominal pain at this time. Labs/urinalysis obtained and reviewed demonstrate no acute or emergent abnormalities. CT scab of abdomen and pelvis demonstrated no evidence of acute abnormality within the abdomen or pelvis. At time of discharge disposition patient is afebrile, nontoxic appearing, vital signs stable and able to maintain O2 sats of 95% on room air. Patient will be discharged home with symptomatic care and outpatient follow up.   [JG]      ED Course User Index  [JG] Rah Shaffer PA            DIAGNOSIS  Final diagnoses:   Nonspecific abdominal pain   History of diabetes mellitus   History of hypertension   History of degenerative disc disease       DISPOSITION    ED Disposition       ED Disposition   Discharge    Condition   Stable    Comment   --               Please note that portions of this document were completed with voice recognition software.        Rah Shaffer PA  09/18/23 1940

## 2024-01-17 ENCOUNTER — APPOINTMENT (OUTPATIENT)
Dept: GENERAL RADIOLOGY | Facility: HOSPITAL | Age: 44
End: 2024-01-17
Payer: COMMERCIAL

## 2024-01-17 ENCOUNTER — APPOINTMENT (OUTPATIENT)
Dept: NEUROLOGY | Facility: HOSPITAL | Age: 44
End: 2024-01-17
Payer: COMMERCIAL

## 2024-01-17 ENCOUNTER — HOSPITAL ENCOUNTER (OUTPATIENT)
Facility: HOSPITAL | Age: 44
Setting detail: OBSERVATION
Discharge: NURSING, REHAB OR LONG-TERM CARE | End: 2024-01-25
Attending: EMERGENCY MEDICINE | Admitting: INTERNAL MEDICINE
Payer: COMMERCIAL

## 2024-01-17 ENCOUNTER — APPOINTMENT (OUTPATIENT)
Dept: CT IMAGING | Facility: HOSPITAL | Age: 44
End: 2024-01-17
Payer: COMMERCIAL

## 2024-01-17 ENCOUNTER — APPOINTMENT (OUTPATIENT)
Dept: MRI IMAGING | Facility: HOSPITAL | Age: 44
End: 2024-01-17
Payer: COMMERCIAL

## 2024-01-17 ENCOUNTER — APPOINTMENT (OUTPATIENT)
Dept: CARDIOLOGY | Facility: HOSPITAL | Age: 44
End: 2024-01-17
Payer: COMMERCIAL

## 2024-01-17 DIAGNOSIS — Z79.4 TYPE 2 DIABETES MELLITUS WITH DIABETIC NEUROPATHY, WITH LONG-TERM CURRENT USE OF INSULIN: ICD-10-CM

## 2024-01-17 DIAGNOSIS — E11.22 TYPE 2 DIABETES MELLITUS WITH CHRONIC KIDNEY DISEASE, WITH LONG-TERM CURRENT USE OF INSULIN, UNSPECIFIED CKD STAGE: ICD-10-CM
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
## 2024-01-17 DIAGNOSIS — E11.40 TYPE 2 DIABETES MELLITUS WITH DIABETIC NEUROPATHY, WITH LONG-TERM CURRENT USE OF INSULIN: ICD-10-CM

## 2024-01-17 DIAGNOSIS — R47.01 APHASIA: ICD-10-CM

## 2024-01-17 DIAGNOSIS — R41.0 CONFUSION: Primary | ICD-10-CM

## 2024-01-17 DIAGNOSIS — S09.90XA CLOSED HEAD INJURY, INITIAL ENCOUNTER: ICD-10-CM

## 2024-01-17 DIAGNOSIS — R47.1 DYSARTHRIA: ICD-10-CM

## 2024-01-17 DIAGNOSIS — R41.841 COGNITIVE COMMUNICATION DEFICIT: ICD-10-CM

## 2024-01-17 DIAGNOSIS — Z79.4 TYPE 2 DIABETES MELLITUS WITH CHRONIC KIDNEY DISEASE, WITH LONG-TERM CURRENT USE OF INSULIN, UNSPECIFIED CKD STAGE: ICD-10-CM

## 2024-01-17 DIAGNOSIS — Z74.09 IMPAIRED FUNCTIONAL MOBILITY, BALANCE, GAIT, AND ENDURANCE: ICD-10-CM
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
## 2024-01-17 PROBLEM — R56.9 SEIZURE: Status: ACTIVE | Noted: 2024-01-17

## 2024-01-17 PROBLEM — I10 PRIMARY HYPERTENSION: Status: ACTIVE | Noted: 2024-01-17

## 2024-01-17 PROBLEM — F31.9 BIPOLAR 1 DISORDER: Status: ACTIVE | Noted: 2024-01-17

## 2024-01-17 PROBLEM — F41.9 ANXIETY DISORDER: Status: ACTIVE | Noted: 2024-01-17

## 2024-01-17 PROBLEM — E11.29 TYPE 2 DIABETES MELLITUS WITH KIDNEY COMPLICATION, WITH LONG-TERM CURRENT USE OF INSULIN: Status: ACTIVE | Noted: 2024-01-17

## 2024-01-17 LAB
ALT SERPL W P-5'-P-CCNC: 10 U/L (ref 1–41)
APTT PPP: 33.7 SECONDS (ref 22–39)
AST SERPL-CCNC: 14 U/L (ref 1–40)
BASOPHILS # BLD AUTO: 0.04 10*3/MM3 (ref 0–0.2)
BASOPHILS NFR BLD AUTO: 0.4 % (ref 0–1.5)
BH CV ECHO MEAS - AO MAX PG: 5.1 MMHG
BH CV ECHO MEAS - AO MEAN PG: 3 MMHG
BH CV ECHO MEAS - AO ROOT DIAM: 3.3 CM
BH CV ECHO MEAS - AO V2 MAX: 113 CM/SEC
BH CV ECHO MEAS - AO V2 VTI: 23.3 CM
BH CV ECHO MEAS - AVA(I,D): 2.7 CM2
BH CV ECHO MEAS - EDV(CUBED): 85.2 ML
BH CV ECHO MEAS - EDV(MOD-SP2): 83.4 ML
BH CV ECHO MEAS - EDV(MOD-SP4): 97.2 ML
BH CV ECHO MEAS - EF(MOD-BP): 59.5 %
BH CV ECHO MEAS - EF(MOD-SP2): 60 %
BH CV ECHO MEAS - EF(MOD-SP4): 59.9 %
BH CV ECHO MEAS - ESV(CUBED): 22 ML
BH CV ECHO MEAS - ESV(MOD-SP2): 33.4 ML
BH CV ECHO MEAS - ESV(MOD-SP4): 39 ML
BH CV ECHO MEAS - FS: 36.4 %
BH CV ECHO MEAS - IVS/LVPW: 1 CM
BH CV ECHO MEAS - IVSD: 0.9 CM
BH CV ECHO MEAS - LA DIMENSION: 3.5 CM
BH CV ECHO MEAS - LAT PEAK E' VEL: 16.3 CM/SEC
BH CV ECHO MEAS - LV DIASTOLIC VOL/BSA (35-75): 45.9 CM2
BH CV ECHO MEAS - LV MASS(C)D: 128 GRAMS
BH CV ECHO MEAS - LV MAX PG: 4.2 MMHG
BH CV ECHO MEAS - LV MEAN PG: 2 MMHG
BH CV ECHO MEAS - LV SYSTOLIC VOL/BSA (12-30): 18.4 CM2
BH CV ECHO MEAS - LV V1 MAX: 102 CM/SEC
BH CV ECHO MEAS - LV V1 VTI: 19.9 CM
BH CV ECHO MEAS - LVIDD: 4.4 CM
BH CV ECHO MEAS - LVIDS: 2.8 CM
BH CV ECHO MEAS - LVOT AREA: 3.1 CM2
BH CV ECHO MEAS - LVOT DIAM: 2 CM
BH CV ECHO MEAS - LVPWD: 0.9 CM
BH CV ECHO MEAS - MED PEAK E' VEL: 10.4 CM/SEC
BH CV ECHO MEAS - MV A MAX VEL: 50.7 CM/SEC
BH CV ECHO MEAS - MV DEC SLOPE: 459 CM/SEC2
BH CV ECHO MEAS - MV DEC TIME: 0.17 SEC
BH CV ECHO MEAS - MV E MAX VEL: 82 CM/SEC
BH CV ECHO MEAS - MV E/A: 1.62
BH CV ECHO MEAS - MV MAX PG: 4.2 MMHG
BH CV ECHO MEAS - MV MEAN PG: 1 MMHG
BH CV ECHO MEAS - MV P1/2T: 64.4 MSEC
BH CV ECHO MEAS - MV V2 VTI: 32 CM
BH CV ECHO MEAS - MVA(P1/2T): 3.4 CM2
BH CV ECHO MEAS - MVA(VTI): 1.95 CM2
BH CV ECHO MEAS - PA ACC TIME: 0.17 SEC
BH CV ECHO MEAS - PI END-D VEL: 94 CM/SEC
BH CV ECHO MEAS - RAP SYSTOLE: 3 MMHG
BH CV ECHO MEAS - RVSP: 14 MMHG
BH CV ECHO MEAS - SI(MOD-SP2): 23.6 ML/M2
BH CV ECHO MEAS - SI(MOD-SP4): 27.5 ML/M2
BH CV ECHO MEAS - SV(LVOT): 62.5 ML
BH CV ECHO MEAS - SV(MOD-SP2): 50 ML
BH CV ECHO MEAS - SV(MOD-SP4): 58.2 ML
BH CV ECHO MEAS - TAPSE (>1.6): 2.38 CM
BH CV ECHO MEAS - TR MAX PG: 11.4 MMHG
BH CV ECHO MEAS - TR MAX VEL: 169 CM/SEC
BH CV ECHO MEASUREMENTS AVERAGE E/E' RATIO: 6.14
BH CV ECHO SHUNT ASSESSMENT PERFORMED (HIDDEN SCRIPTING): 1
BH CV VAS BP RIGHT ARM: NORMAL MMHG
BH CV XLRA - RV BASE: 3.7 CM
BH CV XLRA - RV LENGTH: 7.3 CM
BH CV XLRA - RV MID: 2.6 CM
BH CV XLRA - TDI S': 11.3 CM/SEC
BUN BLDA-MCNC: 14 MG/DL (ref 8–26)
CA-I BLDA-SCNC: 1.26 MMOL/L (ref 1.2–1.32)
CHLORIDE BLDA-SCNC: 101 MMOL/L (ref 98–109)
CO2 BLDA-SCNC: 29 MMOL/L (ref 24–29)
CREAT BLDA-MCNC: 1.5 MG/DL (ref 0.6–1.3)
D-LACTATE SERPL-SCNC: 1.7 MMOL/L (ref 0.5–2)
DEPRECATED RDW RBC AUTO: 46.1 FL (ref 37–54)
EGFRCR SERPLBLD CKD-EPI 2021: 59.2 ML/MIN/1.73
EOSINOPHIL # BLD AUTO: 0.05 10*3/MM3 (ref 0–0.4)
EOSINOPHIL NFR BLD AUTO: 0.5 % (ref 0.3–6.2)
ERYTHROCYTE [DISTWIDTH] IN BLOOD BY AUTOMATED COUNT: 14.1 % (ref 12.3–15.4)
GLUCOSE BLDC GLUCOMTR-MCNC: 106 MG/DL (ref 70–130)
GLUCOSE BLDC GLUCOMTR-MCNC: 117 MG/DL (ref 70–130)
GLUCOSE BLDC GLUCOMTR-MCNC: 125 MG/DL (ref 70–130)
GLUCOSE BLDC GLUCOMTR-MCNC: 98 MG/DL (ref 70–130)
HCT VFR BLD AUTO: 43.7 % (ref 37.5–51)
HCT VFR BLDA CALC: 42 % (ref 38–51)
HGB BLD-MCNC: 14.4 G/DL (ref 13–17.7)
HGB BLDA-MCNC: 14.3 G/DL (ref 12–17)
HOLD SPECIMEN: NORMAL
IMM GRANULOCYTES # BLD AUTO: 0.03 10*3/MM3 (ref 0–0.05)
IMM GRANULOCYTES NFR BLD AUTO: 0.3 % (ref 0–0.5)
INR PPP: 1.7 (ref 0.8–1.2)
LEFT ATRIUM VOLUME INDEX: 19.7 ML/M2
LYMPHOCYTES # BLD AUTO: 4.23 10*3/MM3 (ref 0.7–3.1)
LYMPHOCYTES NFR BLD AUTO: 43 % (ref 19.6–45.3)
MCH RBC QN AUTO: 29.4 PG (ref 26.6–33)
MCHC RBC AUTO-ENTMCNC: 33 G/DL (ref 31.5–35.7)
MCV RBC AUTO: 89.4 FL (ref 79–97)
MONOCYTES # BLD AUTO: 0.99 10*3/MM3 (ref 0.1–0.9)
MONOCYTES NFR BLD AUTO: 10.1 % (ref 5–12)
NEUTROPHILS NFR BLD AUTO: 4.49 10*3/MM3 (ref 1.7–7)
NEUTROPHILS NFR BLD AUTO: 45.7 % (ref 42.7–76)
NRBC BLD AUTO-RTO: 0 /100 WBC (ref 0–0.2)
PLAT MORPH BLD: NORMAL
PLATELET # BLD AUTO: 162 10*3/MM3 (ref 140–450)
PMV BLD AUTO: 13.6 FL (ref 6–12)
POTASSIUM BLDA-SCNC: 3.8 MMOL/L (ref 3.5–4.9)
PROTHROMBIN TIME: 19.5 SECONDS (ref 12.8–15.2)
QT INTERVAL: 442 MS
QTC INTERVAL: 455 MS
RBC # BLD AUTO: 4.89 10*6/MM3 (ref 4.14–5.8)
RBC MORPH BLD: NORMAL
SODIUM BLD-SCNC: 143 MMOL/L (ref 138–146)
TROPONIN T SERPL HS-MCNC: 17 NG/L
VALPROATE SERPL-MCNC: 86.8 MCG/ML (ref 50–125)
WBC MORPH BLD: NORMAL
WBC NRBC COR # BLD AUTO: 9.83 10*3/MM3 (ref 3.4–10.8)
WHOLE BLOOD HOLD COAG: NORMAL
WHOLE BLOOD HOLD SPECIMEN: NORMAL

## 2024-01-17 PROCEDURE — 85007 BL SMEAR W/DIFF WBC COUNT: CPT | Performed by: EMERGENCY MEDICINE

## 2024-01-17 PROCEDURE — 70450 CT HEAD/BRAIN W/O DYE: CPT

## 2024-01-17 PROCEDURE — G0378 HOSPITAL OBSERVATION PER HR: HCPCS

## 2024-01-17 PROCEDURE — 82948 REAGENT STRIP/BLOOD GLUCOSE: CPT

## 2024-01-17 PROCEDURE — 95819 EEG AWAKE AND ASLEEP: CPT | Performed by: PSYCHIATRY & NEUROLOGY

## 2024-01-17 PROCEDURE — 84460 ALANINE AMINO (ALT) (SGPT): CPT | Performed by: EMERGENCY MEDICINE

## 2024-01-17 PROCEDURE — 92523 SPEECH SOUND LANG COMPREHEN: CPT

## 2024-01-17 PROCEDURE — 85730 THROMBOPLASTIN TIME PARTIAL: CPT | Performed by: EMERGENCY MEDICINE

## 2024-01-17 PROCEDURE — 93005 ELECTROCARDIOGRAM TRACING: CPT | Performed by: EMERGENCY MEDICINE

## 2024-01-17 PROCEDURE — 80047 BASIC METABLC PNL IONIZED CA: CPT

## 2024-01-17 PROCEDURE — 36415 COLL VENOUS BLD VENIPUNCTURE: CPT

## 2024-01-17 PROCEDURE — 80164 ASSAY DIPROPYLACETIC ACD TOT: CPT

## 2024-01-17 PROCEDURE — 84484 ASSAY OF TROPONIN QUANT: CPT | Performed by: EMERGENCY MEDICINE

## 2024-01-17 PROCEDURE — 70551 MRI BRAIN STEM W/O DYE: CPT

## 2024-01-17 PROCEDURE — 96374 THER/PROPH/DIAG INJ IV PUSH: CPT

## 2024-01-17 PROCEDURE — 85014 HEMATOCRIT: CPT

## 2024-01-17 PROCEDURE — 71045 X-RAY EXAM CHEST 1 VIEW: CPT

## 2024-01-17 PROCEDURE — 99214 OFFICE O/P EST MOD 30 MIN: CPT

## 2024-01-17 PROCEDURE — 93306 TTE W/DOPPLER COMPLETE: CPT

## 2024-01-17 PROCEDURE — 95819 EEG AWAKE AND ASLEEP: CPT

## 2024-01-17 PROCEDURE — 70544 MR ANGIOGRAPHY HEAD W/O DYE: CPT

## 2024-01-17 PROCEDURE — 99222 1ST HOSP IP/OBS MODERATE 55: CPT | Performed by: STUDENT IN AN ORGANIZED HEALTH CARE EDUCATION/TRAINING PROGRAM

## 2024-01-17 PROCEDURE — 25010000002 LEVETRIRACETAM PER 10 MG

## 2024-01-17 PROCEDURE — 84450 TRANSFERASE (AST) (SGOT): CPT | Performed by: EMERGENCY MEDICINE

## 2024-01-17 PROCEDURE — 70547 MR ANGIOGRAPHY NECK W/O DYE: CPT

## 2024-01-17 PROCEDURE — 85025 COMPLETE CBC W/AUTO DIFF WBC: CPT | Performed by: EMERGENCY MEDICINE

## 2024-01-17 PROCEDURE — 94799 UNLISTED PULMONARY SVC/PX: CPT

## 2024-01-17 PROCEDURE — 83605 ASSAY OF LACTIC ACID: CPT

## 2024-01-17 PROCEDURE — 99291 CRITICAL CARE FIRST HOUR: CPT

## 2024-01-17 PROCEDURE — 85610 PROTHROMBIN TIME: CPT

## 2024-01-17 RX ORDER — SODIUM CHLORIDE 0.9 % (FLUSH) 0.9 %
10 SYRINGE (ML) INJECTION EVERY 12 HOURS SCHEDULED
Status: DISCONTINUED | OUTPATIENT
Start: 2024-01-17 | End: 2024-01-19

## 2024-01-17 RX ORDER — ASPIRIN 300 MG/1
300 SUPPOSITORY RECTAL DAILY
Status: DISCONTINUED | OUTPATIENT
Start: 2024-01-17 | End: 2024-01-25 | Stop reason: HOSPADM

## 2024-01-17 RX ORDER — SODIUM CHLORIDE 9 MG/ML
40 INJECTION, SOLUTION INTRAVENOUS AS NEEDED
Status: DISCONTINUED | OUTPATIENT
Start: 2024-01-17 | End: 2024-01-25 | Stop reason: HOSPADM

## 2024-01-17 RX ORDER — SERTRALINE HYDROCHLORIDE 100 MG/1
200 TABLET, FILM COATED ORAL DAILY
Status: DISCONTINUED | OUTPATIENT
Start: 2024-01-17 | End: 2024-01-25 | Stop reason: HOSPADM

## 2024-01-17 RX ORDER — SODIUM CHLORIDE 0.9 % (FLUSH) 0.9 %
10 SYRINGE (ML) INJECTION AS NEEDED
Status: DISCONTINUED | OUTPATIENT
Start: 2024-01-17 | End: 2024-01-19

## 2024-01-17 RX ORDER — NICOTINE POLACRILEX 4 MG
15 LOZENGE BUCCAL
Status: DISCONTINUED | OUTPATIENT
Start: 2024-01-17 | End: 2024-01-25 | Stop reason: HOSPADM

## 2024-01-17 RX ORDER — DIVALPROEX SODIUM 250 MG/1
500 TABLET, DELAYED RELEASE ORAL 3 TIMES DAILY
Status: DISCONTINUED | OUTPATIENT
Start: 2024-01-17 | End: 2024-01-25 | Stop reason: HOSPADM

## 2024-01-17 RX ORDER — NAPROXEN 500 MG/1
500 TABLET ORAL 2 TIMES DAILY WITH MEALS
COMMUNITY
End: 2024-01-25 | Stop reason: HOSPADM

## 2024-01-17 RX ORDER — CYCLOBENZAPRINE HCL 10 MG
10 TABLET ORAL 3 TIMES DAILY PRN
Status: DISCONTINUED | OUTPATIENT
Start: 2024-01-17 | End: 2024-01-25 | Stop reason: HOSPADM

## 2024-01-17 RX ORDER — BISACODYL 5 MG/1
5 TABLET, DELAYED RELEASE ORAL DAILY PRN
Status: DISCONTINUED | OUTPATIENT
Start: 2024-01-17 | End: 2024-01-25 | Stop reason: HOSPADM

## 2024-01-17 RX ORDER — LEVETIRACETAM 500 MG/5ML
1000 INJECTION, SOLUTION, CONCENTRATE INTRAVENOUS ONCE
Status: COMPLETED | OUTPATIENT
Start: 2024-01-17 | End: 2024-01-17

## 2024-01-17 RX ORDER — SODIUM CHLORIDE 9 MG/ML
40 INJECTION, SOLUTION INTRAVENOUS AS NEEDED
Status: DISCONTINUED | OUTPATIENT
Start: 2024-01-17 | End: 2024-01-19

## 2024-01-17 RX ORDER — ACETAMINOPHEN 500 MG
500 TABLET ORAL EVERY 6 HOURS PRN
COMMUNITY

## 2024-01-17 RX ORDER — CLOMIPRAMINE HYDROCHLORIDE 25 MG/1
25 CAPSULE ORAL NIGHTLY
Status: DISCONTINUED | OUTPATIENT
Start: 2024-01-17 | End: 2024-01-25 | Stop reason: HOSPADM

## 2024-01-17 RX ORDER — INSULIN LISPRO 100 [IU]/ML
2-7 INJECTION, SOLUTION INTRAVENOUS; SUBCUTANEOUS
Status: DISCONTINUED | OUTPATIENT
Start: 2024-01-17 | End: 2024-01-25 | Stop reason: HOSPADM

## 2024-01-17 RX ORDER — POLYETHYLENE GLYCOL 3350 17 G/17G
17 POWDER, FOR SOLUTION ORAL DAILY PRN
Status: DISCONTINUED | OUTPATIENT
Start: 2024-01-17 | End: 2024-01-25 | Stop reason: HOSPADM

## 2024-01-17 RX ORDER — CLOMIPRAMINE HYDROCHLORIDE 25 MG/1
25 CAPSULE ORAL NIGHTLY
COMMUNITY

## 2024-01-17 RX ORDER — SODIUM CHLORIDE 0.9 % (FLUSH) 0.9 %
10 SYRINGE (ML) INJECTION EVERY 12 HOURS SCHEDULED
Status: DISCONTINUED | OUTPATIENT
Start: 2024-01-17 | End: 2024-01-25 | Stop reason: HOSPADM

## 2024-01-17 RX ORDER — BISACODYL 10 MG
10 SUPPOSITORY, RECTAL RECTAL DAILY PRN
Status: DISCONTINUED | OUTPATIENT
Start: 2024-01-17 | End: 2024-01-25 | Stop reason: HOSPADM

## 2024-01-17 RX ORDER — AMOXICILLIN 250 MG
2 CAPSULE ORAL 2 TIMES DAILY
Status: DISCONTINUED | OUTPATIENT
Start: 2024-01-17 | End: 2024-01-25 | Stop reason: HOSPADM

## 2024-01-17 RX ORDER — DEXTROSE MONOHYDRATE 25 G/50ML
25 INJECTION, SOLUTION INTRAVENOUS
Status: DISCONTINUED | OUTPATIENT
Start: 2024-01-17 | End: 2024-01-25 | Stop reason: HOSPADM

## 2024-01-17 RX ORDER — GABAPENTIN 300 MG/1
300 CAPSULE ORAL 3 TIMES DAILY
Status: DISCONTINUED | OUTPATIENT
Start: 2024-01-17 | End: 2024-01-23

## 2024-01-17 RX ORDER — SODIUM CHLORIDE 0.9 % (FLUSH) 0.9 %
10 SYRINGE (ML) INJECTION AS NEEDED
Status: DISCONTINUED | OUTPATIENT
Start: 2024-01-17 | End: 2024-01-25 | Stop reason: HOSPADM

## 2024-01-17 RX ORDER — ASPIRIN 81 MG/1
81 TABLET, CHEWABLE ORAL DAILY
Status: DISCONTINUED | OUTPATIENT
Start: 2024-01-17 | End: 2024-01-25 | Stop reason: HOSPADM

## 2024-01-17 RX ORDER — ACETAMINOPHEN 325 MG/1
650 TABLET ORAL EVERY 4 HOURS PRN
Status: DISCONTINUED | OUTPATIENT
Start: 2024-01-17 | End: 2024-01-25 | Stop reason: HOSPADM

## 2024-01-17 RX ORDER — GABAPENTIN 300 MG/1
300 CAPSULE ORAL 3 TIMES DAILY
Status: ON HOLD | COMMUNITY
End: 2024-01-22

## 2024-01-17 RX ORDER — RANOLAZINE 500 MG/1
500 TABLET, EXTENDED RELEASE ORAL EVERY 12 HOURS SCHEDULED
Status: DISCONTINUED | OUTPATIENT
Start: 2024-01-17 | End: 2024-01-25 | Stop reason: HOSPADM

## 2024-01-17 RX ORDER — IBUPROFEN 600 MG/1
1 TABLET ORAL
Status: DISCONTINUED | OUTPATIENT
Start: 2024-01-17 | End: 2024-01-25 | Stop reason: HOSPADM

## 2024-01-17 RX ORDER — ATORVASTATIN CALCIUM 40 MG/1
80 TABLET, FILM COATED ORAL NIGHTLY
Status: DISCONTINUED | OUTPATIENT
Start: 2024-01-17 | End: 2024-01-25 | Stop reason: HOSPADM

## 2024-01-17 RX ADMIN — CLOMIPRAMINE HYDROCHLORIDE 25 MG: 25 CAPSULE ORAL at 20:35

## 2024-01-17 RX ADMIN — SERTRALINE HYDROCHLORIDE 200 MG: 100 TABLET ORAL at 15:54

## 2024-01-17 RX ADMIN — RANOLAZINE 500 MG: 500 TABLET, EXTENDED RELEASE ORAL at 20:35

## 2024-01-17 RX ADMIN — Medication 10 ML: at 22:36

## 2024-01-17 RX ADMIN — ATORVASTATIN CALCIUM 80 MG: 40 TABLET, FILM COATED ORAL at 20:36

## 2024-01-17 RX ADMIN — DIVALPROEX SODIUM 500 MG: 250 TABLET, DELAYED RELEASE ORAL at 17:22

## 2024-01-17 RX ADMIN — GABAPENTIN 300 MG: 300 CAPSULE ORAL at 15:54

## 2024-01-17 RX ADMIN — DIVALPROEX SODIUM 500 MG: 250 TABLET, DELAYED RELEASE ORAL at 20:35

## 2024-01-17 RX ADMIN — ASPIRIN 81 MG: 81 TABLET, CHEWABLE ORAL at 09:50

## 2024-01-17 RX ADMIN — Medication 10 ML: at 22:35

## 2024-01-17 RX ADMIN — GABAPENTIN 300 MG: 300 CAPSULE ORAL at 20:35

## 2024-01-17 RX ADMIN — LEVETIRACETAM 1000 MG: 100 INJECTION, SOLUTION INTRAVENOUS at 07:50

## 2024-01-17 NOTE — CASE MANAGEMENT/SOCIAL WORK
Discharge Planning Assessment  [facility]     Patient Name: Olivier Swift  MRN: [id number]  Today's Date: 1/17/2024    Admit Date: 1/17/2024    Plan: Home   Discharge Needs Assessment       Row Name 01/17/24 1155       Living Environment    People in Home spouse    Current Living Arrangements apartment    Primary Care Provided by self    Provides Primary Care For no one    Family Caregiver if Needed spouse    Family Caregiver Names Regina García, wife    Quality of Family Relationships helpful;involved;supportive       Transition Planning    Patient/Family Anticipates Transition to home with family    Patient/Family Anticipated Services at Transition        Discharge Needs Assessment    Equipment Currently Used at Home cane, straight;cpap    Concerns to be Addressed denies needs/concerns at this time    Discharge Coordination/Progress Lives in an apartment in [facility] with his wife, independent with ADLs. Has a cane and cpap.  No history of home health. No advanced directives.                   Discharge Plan       Row Name 01/17/24 1156       Plan    Plan Home    Patient/Family in Agreement with Plan yes    Plan Comments I spoke with Mr Swift and his wife Regina at bedside.  Mr Swift resides in an apartment in [facility] with Regina and is normally independent with ADLs.  He has a cane and cpap that he uses at home.  He has not had home health, and does not have an advanced directive.  His insurance is [employer], and he denies any issues or lapses in coverage.  His insurance also helps to cover prescriptions.  His PCP is Pastora Carbajal, and he gets his prescriptions filled at [facility] off of [facility].  At this time, there are no discharge needs.    Final Discharge Disposition Code 01 - home or self-care                  Continued Care and Services - Admitted Since 1/17/2024    Coordination has not been started for this encounter.          Demographic Summary    No  documentation.                  Functional Status       Row Name 01/17/24 1155       Functional Status    Usual Activity Tolerance good    Current Activity Tolerance good       Functional Status, IADL    Medications independent    Meal Preparation independent    Housekeeping independent    Laundry independent    Shopping independent       Mental Status    General Appearance WDL WDL                   Psychosocial    No documentation.                  Abuse/Neglect    No documentation.                  Legal    No documentation.                  Substance Abuse    No documentation.                  Patient Forms    No documentation.                     Erica Renner RN

## 2024-01-17 NOTE — ED PROVIDER NOTES
"Subjective   History of Present Illness  This patient is a very nice 43-year-old -American male who comes in today after a fall.  I was called to the bedside emergently by the nursing staff to assess for potential acute CVA.  Wife is here and provides most of the history secondary to the patient's confusion.  Past medical documentation indicates that the patient has a history of anxiety, bipolar disease, depression, type 2 diabetes, hypertension and sleep apnea as well as cardiac valve disease.  Wife reports that the patient's last known well was last night at approximately 10 or 11 PM.  They both went to sleep and he was acting appropriately.  He has a new job in [facility] and got up after his wife.  His wife is in the kitchen this morning when she heard him fall at approximately 6 AM Eastern time.  She went into the bathroom in the room and found the patient on the ground rubbing his forehead as if he had fallen and struck his head.  No specific signs of trauma.  Patient had no complaints of headache, neck pain, or extremity injury, nor does he currently.  Patient did state that he had a headache last night.  Patient has no history of stroke or seizure.  Wife reports that the patient has a longstanding history of falls and that in fact, his diabetes was ultimately diagnosed after 1 of these aforementioned falls or his glucose levels were found to be extremely high.  Patient is not taking anticoagulation.  Wife reports the patient does not have any slurred speech but is talking much more slowly than usual.  Patient tells me his name, tells me he is at the hospital and tells me his wife's name.  He appears \"dazed\" according to the wife.  She tells me does not usually talk like this.  He denies any numbness or tingling.  He has no discernible weakness.  No facial asymmetry.  No history of seizures.    In summary, we have a 43-year-old gentleman with a history of diabetes who fell today and was found " "in the bathroom holding his forehead.  He was brought to the emergency department for evaluation and is not at baseline with regard to mental status according to his wife.  He is brought in for further care and evaluation.    Past medical history  Anxiety, sleep apnea, hypertension, bipolar depression, type 2 diabetes.  Negative for CVA or CAD.    Family history  Diabetes, maternal grandmother      Review of Systems   Constitutional: Negative.  Negative for chills, fatigue, fever and unexpected weight change.   HENT:  Negative for dental problem, ear pain, hearing loss and sinus pressure.    Eyes:  Negative for pain and visual disturbance.   Respiratory:  Negative for chest tightness and shortness of breath.    Cardiovascular:  Negative for chest pain, palpitations and leg swelling.   Gastrointestinal:  Negative for blood in stool, diarrhea, nausea and vomiting.   Genitourinary:  Negative for difficulty urinating, dysuria, frequency, hematuria and urgency.   Musculoskeletal:  Negative for myalgias, neck pain and neck stiffness.   Neurological:  Positive for headaches. Negative for seizures, syncope, speech difficulty and light-headedness.   Psychiatric/Behavioral:  Positive for confusion.    All other systems reviewed and are negative.      Past Medical History:   Diagnosis Date    Anxiety     Bipolar depression     Depression     Diabetes mellitus     Diabetic amyotrophy associated with type 2 diabetes mellitus     Hypertension     Leaky heart valve     Sleep apnea        Allergies   Allergen Reactions    Trazodone Other (See Comments)     \" CAUSED ME TO HAVE AN ERECTION\"        History reviewed. No pertinent surgical history.    Family History   Problem Relation Age of Onset    Diabetes Maternal Grandmother        Social History     Socioeconomic History    Marital status:    Tobacco Use    Smoking status: Some Days     Types: Cigars     Passive exposure: Never    Smokeless tobacco: Never   Vaping Use    " Vaping Use: Never used   Substance and Sexual Activity    Alcohol use: Never    Drug use: Never    Sexual activity: Defer           Objective   Physical Exam  Vitals and nursing note reviewed.   Constitutional:       General: He is not in acute distress.     Appearance: He is well-developed. He is not toxic-appearing.   HENT:      Head: Normocephalic and atraumatic.      Jaw: No trismus.      Right Ear: Ear canal and external ear normal.      Left Ear: Ear canal and external ear normal.      Nose: Nose normal.      Mouth/Throat:      Mouth: Mucous membranes are moist. Mucous membranes are not dry. No oral lesions.      Dentition: No dental abscesses.      Pharynx: Oropharynx is clear. No posterior oropharyngeal erythema or uvula swelling.      Tonsils: No tonsillar exudate or tonsillar abscesses.   Eyes:      General:         Right eye: No discharge.         Left eye: No discharge.      Extraocular Movements: Extraocular movements intact.      Conjunctiva/sclera: Conjunctivae normal.      Right eye: Right conjunctiva is not injected.      Left eye: Left conjunctiva is not injected.      Pupils: Pupils are equal, round, and reactive to light.   Neck:      Vascular: No JVD.      Trachea: No tracheal tenderness.   Cardiovascular:      Rate and Rhythm: Normal rate and regular rhythm.      Heart sounds: Normal heart sounds.      No friction rub. No gallop.   Pulmonary:      Effort: Pulmonary effort is normal.      Breath sounds: Normal breath sounds. No wheezing or rales.   Chest:      Chest wall: No tenderness.   Abdominal:      General: Bowel sounds are normal. There is no distension.      Palpations: Abdomen is soft. Abdomen is not rigid. There is no mass or pulsatile mass.      Tenderness: There is no abdominal tenderness. There is no guarding or rebound. Negative signs include McBurney's sign.      Comments: No signs of acute abdomen.  No pain at McBurney's point.  No pulsatile abdominal mass.   Musculoskeletal:          General: No tenderness or deformity. Normal range of motion.      Cervical back: Normal range of motion and neck supple. No rigidity. Normal range of motion.   Lymphadenopathy:      Cervical: No cervical adenopathy.   Skin:     General: Skin is warm and dry.      Capillary Refill: Capillary refill takes less than 2 seconds.      Findings: No erythema or rash.      Comments: No diaphoresis, lesions, nevi, petechia, purpura   Neurological:      Mental Status: He is alert. He is disoriented.      Cranial Nerves: No cranial nerve deficit.      Sensory: No sensory deficit.      Motor: No tremor or abnormal muscle tone.      Comments: 5/5 strength bilaterally with flexion and extension of fingers, wrist, elbows, knees and hips as well as plantar and dorsiflexion of the foot.  No facial asymmetry.  No dysarthria.  Patient does appear to have a somewhat slow response to verbal questioning.  He is oriented to person and place.  He does not know the date.   Psychiatric:         Attention and Perception: He is attentive.         Speech: Speech normal.         Behavior: Behavior normal.         Thought Content: Thought content normal.         Judgment: Judgment normal.         Critical Care    Performed by: Felipe Wetzel MD  Authorized by: Felipe Wetzel MD    Critical care provider statement:     Critical care time (minutes):  60    Critical care start time:  1/17/2024 7:00 AM    Critical care end time:  1/17/2024 8:00 AM    Critical care was necessary to treat or prevent imminent or life-threatening deterioration of the following conditions:  CNS failure or compromise    Critical care was time spent personally by me on the following activities:  Development of treatment plan with patient or surrogate, discussions with consultants, evaluation of patient's response to treatment, examination of patient, ordering and performing treatments and interventions, ordering and review of laboratory studies, ordering and  review of radiographic studies, pulse oximetry, re-evaluation of patient's condition and review of old charts    I assumed direction of critical care for this patient from another provider in my specialty: no      Care discussed with: admitting provider               ED Course  ED Course as of 01/17/24 1545   Wed Jan 17, 2024   0655 As mentioned earlier, I was called emergently to the bedside as soon as the patient was brought back to room 16 to assess for potential stroke.  HPI as noted earlier in the document.  At this stage, acute CVA is certainly within the differential diagnosis.  We have ruled out hypoglycemia with a fingerstick here of 117 and a point-of-care Chem-8 that showed a glucose of 125.  Seizure is also possibility.  Patient certainly does have a postictal like appearance.  Last known well was last night prior to going to sleep at approximate 10 or 11 PM.  Patient is not a TNKase candidate.  NIH stroke scale, critical care time and other documentation will be shared when available and workup is complete.  Stroke coordinator is currently in the CT suite after being contacted secondary to code 19.  I do anticipate the patient will be admitted here for further neurologic evaluation.  I have kept the wife closely up-to-date with results.  I reviewed CT scan of the head independently.  Official radiologic read pending.  Final impression and plan following completion of workup. [KENNY]   1664 Discussed the case with [facility] stroke coordinator.  Based on the patient's creatinine, and other factors, acute stroke service/team in consultation with their attending, determined to move forward with MRI/MRA rather than CTA studies.  They would like the patient admitted to the hospitalist for further care.  As more information is available now in the medical record, does appear that at least at some point, the patient does have a history of seizures secondary to Depakote being noted on the chart.  Will discuss this  medication with his wife to see if a diagnosis of seizures in the past was made.  Additionally, patient has an INR here of 1.7 with no history of Coumadin or other anticoagulation which is intriguing. [KENNY]   0708 Official radiologic read has been completed.  They have confirmed no evidence of acute disease process.  My independent interpretation showed no evidence of bleed.  I reviewed the imaging in real-time in the CT scanner. [KENNY]   0709 NIH stroke scale 5.  Patient with mild aphasia and what stroke team believes is some minimal dysarthria on reexamination.  Additionally, stroke service believes he is very slightly weaker on the left side than the right side with hand  strength and extremity movement, something not appreciated on initial exam. [KENNY]   0710 Stroke team is managing additional studies including but not limited to MRI's. [KENNY]   0711 EKG was completed and I reviewed independently.  EKG shows normal sinus rhythm with a rate of 64 nonspecific T wave changes.  I do not appreciate any evidence of acute, dynamic or other concerning changes. [KENNY]   0747 Stroke team agrees with the plan to bring the patient into the hospital.  I have added a valproic acid level to the workup.  AST and ALT are unremarkable.  Troponin likewise unremarkable.  CBC normal.  Hospitalist, , contacted for admission. [KENNY]   0748 Chest x-ray was reviewed independently by me.  My independent interpretation showed no evidence of acute disease process.  Official radiologic read confirms this. [KENNY]   0748 Patient will be admitted with impression includes confusion, aphasia, dysarthria.  Patient be admitted to the hospitalist for further care.  1 g of Keppra has been ordered.  I do anticipate EEG.  I do favor seizure as a potential ultimate diagnosis.  Family updated and aware of the plan the patient will be admitted accordingly.  Total critical care time 60 minutes. [KENNY]   0808 I had a nice conversation with the patient, the  patient's wife, and the patient's daughter at the time that he was admitted to the hospitalist.  Patient's vital signs look great.  Blood pressure 123/85 with a heart rate of 64 and saturations of 94%.  We discussed the need for admission, the Keppra that was ordered, my concern about seizure activity, lab results and available imaging results at the time of admission.  Wife verbalized full understanding and appreciation.  Critical care time 60 minutes.  Patient admitted to  neurologic consultation in [place]. [KENNY]      ED Course User Index  [KENNY] Felipe Wetzel MD      Recent Results (from the past 24 hour(s))   POC Glucose Once    Collection Time: 01/17/24  6:42 AM    Specimen: Blood   Result Value Ref Range    Glucose 117 70 - 130 mg/dL   POC Protime / INR    Collection Time: 01/17/24  6:53 AM    Specimen: Blood   Result Value Ref Range    Protime 19.5 (H) 12.8 - 15.2 seconds    INR 1.7 (H) 0.8 - 1.2   POC CHEM 8    Collection Time: 01/17/24  6:54 AM    Specimen: Blood   Result Value Ref Range    Glucose 125 70 - 130 mg/dL    BUN 14 8 - 26 mg/dL    Creatinine 1.50 (H) 0.60 - 1.30 mg/dL    Sodium 143 138 - 146 mmol/L    POC Potassium 3.8 3.5 - 4.9 mmol/L    Chloride 101 98 - 109 mmol/L    Total CO2 29 24 - 29 mmol/L    Hemoglobin 14.3 12.0 - 17.0 g/dL    Hematocrit 42 38 - 51 %    Ionized Calcium 1.26 1.20 - 1.32 mmol/L    eGFR 59.2 (L) >60.0 mL/min/1.73   aPTT    Collection Time: 01/17/24  6:56 AM    Specimen: Blood   Result Value Ref Range    PTT 33.7 22.0 - 39.0 seconds   Lavender Top    Collection Time: 01/17/24  6:56 AM   Result Value Ref Range    Extra Tube hold for add-on    Light Blue Top    Collection Time: 01/17/24  6:56 AM   Result Value Ref Range    Extra Tube Hold for add-ons.    CBC Auto Differential    Collection Time: 01/17/24  6:56 AM    Specimen: Blood   Result Value Ref Range    WBC 9.83 3.40 - 10.80 10*3/mm3    RBC 4.89 4.14 - 5.80 10*6/mm3    Hemoglobin 14.4 13.0 - 17.7 g/dL     Hematocrit 43.7 37.5 - 51.0 %    MCV 89.4 79.0 - 97.0 fL    MCH 29.4 26.6 - 33.0 pg    MCHC 33.0 31.5 - 35.7 g/dL    RDW 14.1 12.3 - 15.4 %    RDW-SD 46.1 37.0 - 54.0 fl    MPV 13.6 (H) 6.0 - 12.0 fL    Platelets 162 140 - 450 10*3/mm3    Neutrophil % 45.7 42.7 - 76.0 %    Lymphocyte % 43.0 19.6 - 45.3 %    Monocyte % 10.1 5.0 - 12.0 %    Eosinophil % 0.5 0.3 - 6.2 %    Basophil % 0.4 0.0 - 1.5 %    Immature Grans % 0.3 0.0 - 0.5 %    Neutrophils, Absolute 4.49 1.70 - 7.00 10*3/mm3    Lymphocytes, Absolute 4.23 (H) 0.70 - 3.10 10*3/mm3    Monocytes, Absolute 0.99 (H) 0.10 - 0.90 10*3/mm3    Eosinophils, Absolute 0.05 0.00 - 0.40 10*3/mm3    Basophils, Absolute 0.04 0.00 - 0.20 10*3/mm3    Immature Grans, Absolute 0.03 0.00 - 0.05 10*3/mm3    nRBC 0.0 0.0 - 0.2 /100 WBC   Scan Slide    Collection Time: 01/17/24  6:56 AM    Specimen: Blood   Result Value Ref Range    RBC Morphology Normal Normal    WBC Morphology Normal Normal    Platelet Morphology Normal Normal   Single High Sensitivity Troponin T    Collection Time: 01/17/24  6:57 AM    Specimen: Blood   Result Value Ref Range    HS Troponin T 17 <22 ng/L   AST    Collection Time: 01/17/24  6:57 AM    Specimen: Blood   Result Value Ref Range    AST (SGOT) 14 1 - 40 U/L   ALT    Collection Time: 01/17/24  6:57 AM    Specimen: Blood   Result Value Ref Range    ALT (SGPT) 10 1 - 41 U/L   Green Top (Gel)    Collection Time: 01/17/24  6:57 AM   Result Value Ref Range    Extra Tube Hold for add-ons.    Gold Top - SST    Collection Time: 01/17/24  6:57 AM   Result Value Ref Range    Extra Tube Hold for add-ons.    Gray Top    Collection Time: 01/17/24  6:57 AM   Result Value Ref Range    Extra Tube Hold for add-ons.    Valproic Acid Level, Total    Collection Time: 01/17/24  6:57 AM    Specimen: Blood   Result Value Ref Range    Valproic Acid 86.8 50.0 - 125.0 mcg/mL   Lactic Acid, Plasma    Collection Time: 01/17/24  6:57 AM    Specimen: Blood   Result Value Ref Range     Lactate 1.7 0.5 - 2.0 mmol/L   ECG 12 Lead ED Triage Standing Order; Acute Stroke (Onset <24 hrs)    Collection Time: 01/17/24  7:08 AM   Result Value Ref Range    QT Interval 442 ms    QTC Interval 455 ms     Note: In addition to lab results from this visit, the labs listed above may include labs taken at another facility or during a different encounter within the last 24 hours. Please correlate lab times with ED admission and discharge times for further clarification of the services performed during this visit.    MRI Angiogram Neck Without Contrast   Final Result   1.  10 x 5 mm focus of FLAIR signal hyperintensity within the right parietal occipital region (series 9, image 18). This finding is nonspecific and may be related to chronic microvascular ischemic change, postinfectious/postinflammatory states,    demyelinating conditions, as well as other etiologies. If this location corresponds to patient's acute symptomatology, consider further evaluation with contrast-enhanced MRI of the brain.   2.No acute abnormality identified within the large arteries of the head or neck.   3.No significant stenosis of the bilateral internal carotid arteries.      Electronically Signed: Felix Nelson MD     1/17/2024 12:12 PM EST     Workstation ID: [id number]      MRI Angiogram Head Without Contrast   Final Result   1.  10 x 5 mm focus of FLAIR signal hyperintensity within the right parietal occipital region (series 9, image 18). This finding is nonspecific and may be related to chronic microvascular ischemic change, postinfectious/postinflammatory states,    demyelinating conditions, as well as other etiologies. If this location corresponds to patient's acute symptomatology, consider further evaluation with contrast-enhanced MRI of the brain.   2.No acute abnormality identified within the large arteries of the head or neck.   3.No significant stenosis of the bilateral internal carotid arteries.      Electronically Signed:  "Felix Nelson MD     1/17/2024 12:12 PM EST     Workstation ID: [id number]      MRI Brain Without Contrast   Final Result   1.  10 x 5 mm focus of FLAIR signal hyperintensity within the right parietal occipital region (series 9, image 18). This finding is nonspecific and may be related to chronic microvascular ischemic change, postinfectious/postinflammatory states,    demyelinating conditions, as well as other etiologies. If this location corresponds to patient's acute symptomatology, consider further evaluation with contrast-enhanced MRI of the brain.   2.No acute abnormality identified within the large arteries of the head or neck.   3.No significant stenosis of the bilateral internal carotid arteries.      Electronically Signed: Felix Nelson MD     1/17/2024 12:12 PM EST     Workstation ID: [id number]      XR Chest 1 View   Final Result   Impression:   No evidence of acute cardiopulmonary disease.          Electronically Signed: Jordan Castro MD     1/17/2024 7:23 AM EST     Workstation ID: [id number]      CT Head Without Contrast Stroke Protocol   Final Result   Impression:   Normal            Electronically Signed: Ar Fitch MD     1/17/2024 6:53 AM EST     Workstation ID: [id number]        Vitals:    01/17/24 1300 01/17/24 1315 01/17/24 1330 01/17/24 1453   BP: 136/94  127/93    Pulse: 58 61 67    Resp:       Temp:       TempSrc:       SpO2: 98% 95% 97%    Weight:    107 kg (235 lb 14.3 oz)   Height:    165.1 cm (65\")     Medications   sodium chloride 0.9 % flush 10 mL (has no administration in time range)   sodium chloride 0.9 % flush 10 mL (has no administration in time range)   sodium chloride 0.9 % flush 10 mL (has no administration in time range)   sodium chloride 0.9 % infusion 40 mL (has no administration in time range)   atorvastatin (LIPITOR) tablet 80 mg (has no administration in time range)   aspirin chewable tablet 81 mg (81 mg Oral Given 1/17/24 0950)     Or   aspirin suppository 300 mg ( Rectal " Not Given:  See Alt 1/17/24 0950)   clomiPRAMINE (ANAFRANIL) capsule 25 mg (has no administration in time range)   cyclobenzaprine (FLEXERIL) tablet 10 mg (has no administration in time range)   divalproex (DEPAKOTE) DR tablet 500 mg (has no administration in time range)   gabapentin (NEURONTIN) capsule 300 mg (has no administration in time range)   ranolazine (RANEXA) 12 hr tablet 500 mg (has no administration in time range)   sertraline (ZOLOFT) tablet 200 mg (has no administration in time range)   sodium chloride 0.9 % flush 10 mL (has no administration in time range)   sodium chloride 0.9 % flush 10 mL (has no administration in time range)   sodium chloride 0.9 % infusion 40 mL (has no administration in time range)   sennosides-docusate (PERICOLACE) 8.6-50 MG per tablet 2 tablet (has no administration in time range)     And   polyethylene glycol (MIRALAX) packet 17 g (has no administration in time range)     And   bisacodyl (DULCOLAX) EC tablet 5 mg (has no administration in time range)     And   bisacodyl (DULCOLAX) suppository 10 mg (has no administration in time range)   acetaminophen (TYLENOL) tablet 650 mg (has no administration in time range)   dextrose (GLUTOSE) oral gel 15 g (has no administration in time range)   dextrose (D50W) (25 g/50 mL) IV injection 25 g (has no administration in time range)   glucagon (GLUCAGEN) injection 1 mg (has no administration in time range)   Insulin Lispro (humaLOG) injection 2-7 Units ( Subcutaneous Canceled Entry 1/17/24 1513)   levETIRAcetam (KEPPRA) injection 1,000 mg (1,000 mg Intravenous Given 1/17/24 0750)     ECG/EMG Results (last 24 hours)       Procedure Component Value Units Date/Time    ECG 12 Lead ED Triage Standing Order; Acute Stroke (Onset <24 hrs) [[id number]] Collected: 01/17/24 0708     Updated: 01/17/24 1452     QT Interval 442 ms      QTC Interval 455 ms     Narrative:      Test Reason : ED Triage Standing Order~  Blood Pressure :   */*   mmHG  Vent.  Rate :  64 BPM     Atrial Rate :  64 BPM     P-R Int : 114 ms          QRS Dur :  96 ms      QT Int : 442 ms       P-R-T Axes :  27   7   4 degrees     QTc Int : 455 ms    Normal sinus rhythm  Nonspecific T wave abnormality  Abnormal ECG  When compared with ECG of 01-JUL-2022 15:23,  No significant change was found  Confirmed by MARLIN URENA MD ([id number]) on 1/17/2024 2:52:08 PM    Referred By: EDMD           Confirmed By: MARLIN URENA MD    Adult Transthoracic Echo Complete W/ Cont if Necessary Per Protocol (With Agitated Saline) [[id number]] Resulted: 01/17/24 1453     Updated: 01/17/24 1453          ECG 12 Lead ED Triage Standing Order; Acute Stroke (Onset <24 hrs)   Final Result   Test Reason : ED Triage Standing Order~   Blood Pressure :   */*   mmHG   Vent. Rate :  64 BPM     Atrial Rate :  64 BPM      P-R Int : 114 ms          QRS Dur :  96 ms       QT Int : 442 ms       P-R-T Axes :  27   7   4 degrees      QTc Int : 455 ms      Normal sinus rhythm   Nonspecific T wave abnormality   Abnormal ECG   When compared with ECG of 01-JUL-2022 15:23,   No significant change was found   Confirmed by MARLIN URENA MD ([id number]) on 1/17/2024 2:52:08 PM      Referred By: EDMD           Confirmed By: MARLIN URENA MD                              Total (NIH Stroke Scale): 4                  Medical Decision Making  Certainly must consider seizure, hypoglycemia, acute CVA, whether ischemic or hemorrhagic.  Will get CT imaging, engage the acute stroke team, and get a lab workup as well.  I do anticipate a further/deeper neurologic workup will be warranted including but not limited to EEG at some point, whether as part of an acute workup or outpatient workup.  Patient is not a TNKase candidate secondary to last known well of last night.    Problems Addressed:  Accident due to mechanical fall without injury, initial encounter: complicated acute illness or injury  Aphasia: complicated acute illness or injury  Closed head injury,  initial encounter: complicated acute illness or injury  Confusion: complicated acute illness or injury    Amount and/or Complexity of Data Reviewed  Labs: ordered. Decision-making details documented in ED Course.  Radiology: ordered. Decision-making details documented in ED Course.  ECG/medicine tests: ordered. Decision-making details documented in ED Course.    Risk  Prescription drug management.  Decision regarding hospitalization.        Final diagnoses:   Confusion   Closed head injury, initial encounter   Accident due to mechanical fall without injury, initial encounter   Aphasia       ED Disposition  ED Disposition       ED Disposition   Decision to Admit    Condition   --    Comment   Level of Care: Telemetry [5]   Diagnosis: Seizure [[id number]]   Admitting Physician: JOSE EUGENE [[id number]]   Attending Physician: JOSE EUGENE [[id number]]   Bed Request Comments: 3E/F                 No follow-up provider specified.       Medication List      No changes were made to your prescriptions during this visit.            Felipe Wetzel MD  01/17/24 9417

## 2024-01-17 NOTE — ED NOTES
Olivier Swift    Nursing Report ED to Floor:  Mental status: A&Ox4  Ambulatory status: x1  Oxygen Therapy:  2L  Cardiac Rhythm: SR  Admitted from: ed  Safety Concerns:  fall risk  Social Issues: none  ED Room #:  16    ED Nurse Phone Extension - [phone] or may call [phone].      HPI:   Chief Complaint   Patient presents with    Fall    Head Injury       Past Medical History:  Past Medical History:   Diagnosis Date    Anxiety     Bipolar depression     Depression     Diabetes mellitus     Diabetic amyotrophy associated with type 2 diabetes mellitus     Hypertension     Leaky heart valve     Sleep apnea         Past Surgical History:  History reviewed. No pertinent surgical history.     Admitting Doctor:   Destini Montes MD    Consulting Provider(s):  Consults       Date and Time Order Name Status Description    1/17/2024  6:46 AM Inpatient Neurology Consult Stroke Completed              Admitting Diagnosis:   The primary encounter diagnosis was Confusion. Diagnoses of Closed head injury, initial encounter, Accident due to mechanical fall without injury, initial encounter, and Aphasia were also pertinent to this visit.    Most Recent Vitals:   Vitals:    01/17/24 0830 01/17/24 0900 01/17/24 0930 01/17/24 0959   BP: 128/84 123/86 128/92 122/85   Pulse: 67 59 76 60   Resp:       Temp:       TempSrc:       SpO2: 95% 94% 98% 98%   Weight:       Height:           Active LDAs/IV Access:   Lines, Drains & Airways       Active LDAs       Name Placement date Placement time Site Days    Peripheral IV 01/17/24 0711 Left Antecubital 01/17/24  0711  Antecubital  less than 1                    Labs (abnormal labs have a star):   Labs Reviewed   CBC WITH AUTO DIFFERENTIAL - Abnormal; Notable for the following components:       Result Value    MPV 13.6 (*)     Lymphocytes, Absolute 4.23 (*)     Monocytes, Absolute 0.99 (*)     All other components within normal limits    Narrative:     Appended report. These results have been appended to  a previously verified report.   POCT PROTIME - INR - Abnormal; Notable for the following components:    Protime 19.5 (*)     INR 1.7 (*)     All other components within normal limits   POCT CHEM 8 - Abnormal; Notable for the following components:    Creatinine 1.50 (*)     eGFR 59.2 (*)     All other components within normal limits   SINGLE HSTROPONIN T - Normal    Narrative:     High Sensitive Troponin T Reference Range:  <14.0 ng/L- Negative Female for AMI  <22.0 ng/L- Negative Male for AMI  >=14 - Abnormal Female indicating possible myocardial injury.  >=22 - Abnormal Male indicating possible myocardial injury.   Clinicians would have to utilize clinical acumen, EKG, Troponin, and serial changes to determine if it is an Acute Myocardial Infarction or myocardial injury due to an underlying chronic condition.        APTT - Normal    Narrative:     PTT = The equivalent PTT values for the therapeutic range of heparin levels at 0.3 to 0.5 U/ml are 60 to 70 seconds.   AST - Normal   ALT - Normal   SCAN SLIDE - Normal   VALPROIC ACID LEVEL, TOTAL - Normal    Narrative:     Therapeutic Ranges for Valproic Acid    Epilepsy:       mcg/ml  Bipolar/Deirdre  up to 125 mcg/ml     LACTIC ACID, PLASMA - Normal   POCT GLUCOSE FINGERSTICK - Normal   RAINBOW DRAW    Narrative:     The following orders were created for panel order [place] Draw.  Procedure                               Abnormality         Status                     ---------                               -----------         ------                     Green Top (Gel)[[id number]]                                  Final result               Lavender Top[[id number]]                                     Final result               Gold Top - [facility][[id number]]                                   Final result               Gray Top[[id number]]                                         Final result               Light Blue Top[[id number]]                                   Final result                  Please view results for these tests on the individual orders.   POCT CHEM 8   POCT PROTIME - INR   POCT GLUCOSE FINGERSTICK   POCT GLUCOSE FINGERSTICK   POCT GLUCOSE FINGERSTICK   POCT GLUCOSE FINGERSTICK   POCT GLUCOSE FINGERSTICK   CBC AND DIFFERENTIAL    Narrative:     The following orders were created for panel order CBC & Differential.  Procedure                               Abnormality         Status                     ---------                               -----------         ------                     CBC Auto Differential[[id number]]        Abnormal            Final result               Scan Slide[[id number]]                   Normal              Final result                 Please view results for these tests on the individual orders.   GREEN TOP   LAVENDER TOP   GOLD TOP - SST   GRAY TOP   LIGHT BLUE TOP       Meds Given in ED:   Medications   sodium chloride 0.9 % flush 10 mL (has no administration in time range)   aspirin chewable tablet 81 mg (81 mg Oral Given 1/17/24 0950)     Or   aspirin suppository 300 mg ( Rectal Not Given:  See Alt 1/17/24 0950)   levETIRAcetam (KEPPRA) injection 1,000 mg (1,000 mg Intravenous Given 1/17/24 0750)

## 2024-01-17 NOTE — CONSULTS
Stroke Consult Note    Patient Name: Olivier Swift   MRN: [id number]  Age: 43 y.o.  Sex: male  : 1980    Primary Care Physician: Pastora Carbajal APRN  Referring Physician: Dr. Wetzel    TIME STROKE TEAM CALLED: 0650 EST     TIME PATIENT SEEN: 0655 EST    Handedness: Right  Race: White    Chief Complaint/Reason for Consultation: Altered mental status, speech difficulty    HPI: Mr. Swift is a 43-year-old male with PMH of anxiety, bipolar, DM, HTN, HLD, seizure disorder and EULOGIO.  He presents to [facility] ED with new onset of altered mental status and speech difficulty.  Wife reports patient's last known well of 2024 at 9:30 PM prior to him going to bed.  While she was getting ready this morning at 5:45 AM she heard him fall.  Ms. Swift found patient holding his head and in altered state.  Patient was brought to [facility] ED for further evaluation of presenting symptoms.    Initial NIH 5.  Blood pressure 157/103.  On exam patient is able to answer questions appropriately and follow one-step commands.  Patient does complain of headache.  Decreased sensitivity left side of body.  Expressive aphasia with mild dysarthria noted during conversation. Strength left upper extremity 4/5. Family reports patient had a unwitnessed fall this morning.  Occasional smoker and no EtOH use.  Takes prescribed medications routinely.      Last Known Normal Date/Time: 2024 at 2130 EST     Review of Systems   Constitutional:  Negative for fever.   HENT:  Negative for trouble swallowing and voice change.    Eyes:  Positive for visual disturbance.   Respiratory:  Negative for shortness of breath.    Cardiovascular:  Negative for chest pain.   Gastrointestinal:  Negative for abdominal pain and nausea.   Neurological:  Positive for speech difficulty, weakness, numbness and headaches.   Psychiatric/Behavioral:  Negative for confusion.       Past Medical History:   Diagnosis Date    Anxiety     Bipolar depression     Depression      "Diabetes mellitus     Diabetic amyotrophy associated with type 2 diabetes mellitus     Hypertension     Leaky heart valve     Sleep apnea      History reviewed. No pertinent surgical history.  Family History   Problem Relation Age of Onset    Diabetes Maternal Grandmother      Social History     Socioeconomic History    Marital status:    Tobacco Use    Smoking status: Some Days     Types: Cigars     Passive exposure: Never    Smokeless tobacco: Never   Vaping Use    Vaping Use: Never used   Substance and Sexual Activity    Alcohol use: Never    Drug use: Never    Sexual activity: Defer     Allergies   Allergen Reactions    Trazodone Other (See Comments)     \" CAUSED ME TO HAVE AN ERECTION\"      Prior to Admission medications    Medication Sig Start Date End Date Taking? Authorizing Provider   acetaminophen (TYLENOL) 500 MG tablet Take 1 tablet by mouth Every 6 (Six) Hours As Needed for Mild Pain.    Lu Polanco MD   amLODIPine (NORVASC) 5 MG tablet Take 1 tablet by mouth Daily.    Lu Polanco MD   atorvastatin (LIPITOR) 80 MG tablet Take 1 tablet by mouth Daily.    Lu Polanco MD   BD Pen Needle Micro U/F 32G X 6 MM misc USE 1 NEEDLE ONCE DAILY 12/11/22   Lu Polanco MD   clomiPRAMINE (ANAFRANIL) 25 MG capsule Take 1 capsule by mouth Every Night.    Lu Polanco MD   Continuous Blood Gluc Sensor (Dexcom G6 Sensor) USE ONE SENSOR WITH TRANSMITTER EVERY 10 DAYS 3/6/23   Lu Polanco MD   Continuous Blood Gluc Transmit (Dexcom G6 Transmitter) misc APPLY 1 MONTHLY TO ABDOMEN 1/3/23   Lu Polanco MD   cyclobenzaprine (FLEXERIL) 10 MG tablet Take 1 tablet by mouth 3 (Three) Times a Day As Needed for Muscle Spasms. 3/26/23   Xu Lee PA   diclofenac (VOLTAREN) 50 MG EC tablet Take 1 tablet by mouth 3 (Three) Times a Day. 3/26/23   Xu Lee PA   divalproex (DEPAKOTE) 500 MG DR tablet Take 1 tablet by mouth 3 (Three) Times a " Day.    Lu Polanco MD   gabapentin (NEURONTIN) 300 MG capsule Take 1 capsule by mouth 3 (Three) Times a Day.    Lu Polanco MD   hydroCHLOROthiazide (HYDRODIURIL) 25 MG tablet Take 1 tablet by mouth Daily.    Lu Polanco MD   Hydrocort-Pramoxine, Perianal, (PROCTOFOAM-HS) 1-1 % rectal foam Insert 1 application into the rectum 2 (Two) Times a Day.  Patient not taking: Reported on 8/9/2023 8/16/22   Star Cheung PA-C   Levemir FlexTouch 100 UNIT/ML injection Inject 15 Units under the skin into the appropriate area as directed Daily. 5/31/23   Lu Polanco MD   lidocaine (LIDODERM) 5 % 1 patch As Needed. 8/3/23   Lu Polanco MD   lisinopril (PRINIVIL,ZESTRIL) 40 MG tablet Take 1 tablet by mouth 2 (Two) Times a Day.    Lu Polanco MD   meclizine (ANTIVERT) 25 MG tablet Take 1 tablet by mouth 3 (Three) Times a Day As Needed for Dizziness. 7/1/22   Xu Sethi APRN   metFORMIN (GLUCOPHAGE) 500 MG tablet Take 1 tablet by mouth 2 (Two) Times a Day With Meals. 8/25/21   Daisy Marina MD   metoprolol succinate XL (TOPROL-XL) 25 MG 24 hr tablet Take 1 tablet by mouth Daily. 2/17/23   Lu Polanco MD   naproxen (NAPROSYN) 500 MG tablet Take 1 tablet by mouth 2 (Two) Times a Day With Meals.    Lu Polanco MD   Ozempic, 0.25 or 0.5 MG/DOSE, 2 MG/3ML solution pen-injector INJECT 0.5 MG SUBCUTANEOUSLY ONCE WEEKLY 7/7/23   Lu Polanco MD   Ozempic, 1 MG/DOSE, 4 MG/3ML solution pen-injector  8/31/23   Lu Polanco MD   prazosin (MINIPRESS) 2 MG capsule Take 1 capsule by mouth Every Night.    Lu Polanco MD   PROPRANOLOL HCL PO Take  by mouth.    Emergency, Nurse Epic, RN   QUEtiapine (SEROquel) 100 MG tablet Take 1 tablet by mouth Every Night.    Lu Polanco MD   ranolazine (RANEXA) 500 MG 12 hr tablet Take 1 tablet by mouth 2 (Two) Times a Day.    Emergency, Nurse [place], RN   Semaglutide, 1 MG/DOSE,  (OZEMPIC) 2 MG/1.5ML solution pen-injector Inject  under the skin into the appropriate area as directed 1 (One) Time Per Week.    Provider, MD Lu   sertraline (ZOLOFT) 100 MG tablet Take 2 tablets by mouth Daily.    Emergency, Nurse Epic, RN         Temp:  [97.8 °F (36.6 °C)] 97.8 °F (36.6 °C)  Heart Rate:  [65] 65  Resp:  [17] 17  BP: (157)/(103) 157/103  Neurological Exam  Mental Status  Alert. Oriented to person, place and time. Oriented to person, place, and time. Mild dysarthria present. Expressive aphasia present.    Cranial Nerves  CN II: Right visual acuity: Counts fingers. Left visual acuity: Normal.  CN III, IV, VI: Extraocular movements intact bilaterally. Pupils equal round and reactive to light bilaterally.  CN V:  Left: Diminished sensation of the entire left side of the face.  CN VII: Full and symmetric facial movement.  CN VIII: Hearing intact.  CN XI: Shoulder shrug strength is normal.  CN XII: Tongue midline without atrophy or fasciculations.    Motor  Normal muscle bulk throughout. Normal muscle tone. Strength is 5/5 in all four extremities except as noted.  Strength left upper extremity 4/5..    Sensory  Light touch abnormality: Sensation: Decreased sensitivity left side of body.     Coordination    Unable to assess.    Gait    Unable to assess.      Physical Exam  Constitutional:       General: He is not in acute distress.     Appearance: Normal appearance.   HENT:      Head: Normocephalic and atraumatic.      Nose: Nose normal.      Mouth/Throat:      Mouth: Mucous membranes are dry.   Eyes:      Extraocular Movements: Extraocular movements intact.      Pupils: Pupils are equal, round, and reactive to light.   Cardiovascular:      Pulses: Normal pulses.   Pulmonary:      Effort: Pulmonary effort is normal.   Abdominal:      General: Abdomen is flat.   Musculoskeletal:         General: Normal range of motion.      Cervical back: Normal range of motion.   Skin:     General: Skin is warm  and dry.   Neurological:      Mental Status: He is alert and oriented to person, place, and time.      Cranial Nerves: Cranial nerve deficit and dysarthria present.      Sensory: Sensory deficit present.      Motor: Weakness present.   Psychiatric:         Attention and Perception: He is inattentive.         Behavior: Behavior is slowed.         Cognition and Memory: Cognition is impaired.         Acute Stroke Data    Thrombolytic Inclusion / Exclusion Criteria    Time: 07:44 EST  Person Administering Scale: HILL Ross    Inclusion Criteria  [x]   18 years of age or greater   []   Onset of symptoms < 4.5 hours before beginning treatment (stroke onset = time patient was last seen well or without symptoms).   []   Diagnosis of acute ischemic stroke causing measurable disabling deficit (Complete Hemianopia, Any Aphasia, Visual or Sensory Extinction, Any weakness limiting sustained effort against gravity)   []   Any remaining deficit considered potentially disabling in view of patient and practitioner   Exclusion criteria (Do not proceed with Alteplase if any are checked under exclusion criteria)  [x]   Onset unknown or GREATER than 4.5 hours   []   ICH on CT/MRI   []   CT demonstrates hypodensity representing acute or subacute infarct   []   Significant head trauma or prior stroke in the previous 3 months   []   Symptoms suggestive of subarachnoid hemorrhage   []   History of un-ruptured intracranial aneurysm GREATER than 10 mm   []   Recent intracranial or intraspinal surgery within the last 3 months   []   Arterial puncture at a non-compressible site in the previous 7 days   []   Active internal bleeding   []   Acute bleeding tendency   []   Platelet count LESS than 100,000 for known hematological diseases such as leukemia, thrombocytopenia or chronic cirrhosis   []   Current use of anticoagulant with INR GREATER than 1.7 or PT GREATER than 15 seconds, aPTT GREATER than 40 seconds   []   Heparin received  within 48 hours, resulting in abnormally elevated aPTT GREATER than upper limit of normal   []   Current use of direct thrombin inhibitors or direct factor Xa inhibitors in the past 48 hours   []   Elevated blood pressure refractory to treatment (systolic GREATER than 185 mm/Hg or diastolic  GREATER than 110 mm/Hg   []   Suspected infective endocarditis and aortic arch dissection   []   Current use of therapeutic treatment dose of low-molecular-weight heparin (LMWH) within the previous 24 hours   []   Structural GI malignancy or bleed   Relative exclusion for all patients  []   Only minor non-disabling symptoms   []   Pregnancy   []   Seizure at onset with postictal residual neurological impairments   []   Major surgery or previous trauma within past 14 days   []   History of previous spontaneous ICH, intracranial neoplasm, or AV malformation   []   Postpartum (within previous 14 days)   []   Recent GI or urinary tract hemorrhage (within previous 21 days)   []   Recent acute MI (within previous 3 months)   []   History of un-ruptured intracranial aneurysm LESS than 10 mm   []   History of ruptured intracranial aneurysm   []   Blood glucose LESS than 50 mg/dL (2.7 mmol/L)   []   Dural puncture within the last 7 days   []   Known GREATER than 10 cerebral microbleeds   Additional exclusions for patients with symptoms onset between 3 and 4.5 hours.  []   Age > 80.   []   On any anticoagulants regardless of INR  >>> Warfarin (Coumadin), Heparin, Enoxaparin (Lovenox), fondaparinux (Arixtra), bivalirudin (Angiomax), Argatroban, dabigatran (Pradaxa), rivaroxaban (Xarelto), or apixaban (Eliquis)   []   Severe stroke (NIHSS > 25).   []   History of BOTH diabetes and previous ischemic stroke.   []   The risks and benefits have been discussed with the patient or family related to the administration of IV thrombolytic therapy for stroke symptoms.   []   I have discussed and reviewed the patient's case and imaging with the  attending prior to IV thrombolytic therapy.   NA Time IV thrombolytic administered       Hospital Meds:  Scheduled- levETIRAcetam, 1,000 mg, Intravenous, Once      Infusions-     PRNs-   sodium chloride    Functional Status Prior to Current Stroke/Oxford Score:   MODIFIED YEFRI SCALE (to be assessed for each patient having history of stroke) []Stroke history but not assessed  [x]0: No symptoms at all  []1: No significant disability despite symptoms  []2: Slight disability  []3: Moderate disability  []4: Moderately severe disability  []5: Severe disability  []6: Death        NIH Stroke Scale  Time: 07:44 EST  Person Administering Scale: HILL Ross  Interval: baseline  1a. Level of Consciousness: 1-->Not alert, but arousable by minor stimulation to obey, answer, or respond  1b. LOC Questions: 0-->Answers both questions correctly  1c. LOC Commands: 0-->Performs both tasks correctly  2. Best Gaze: 1-->Partial gaze palsy, gaze is abnormal in one or both eyes, but forced deviation or total gaze paresis is not present  3. Visual: 0-->No visual loss  4. Facial Palsy: 0-->Normal symmetrical movements  5a. Motor Arm, Left: 0-->No drift, limb holds 90 (or 45) degrees for full 10 secs  5b. Motor Arm, Right: 0-->No drift, limb holds 90 (or 45) degrees for full 10 secs  6a. Motor Leg, Left: 0-->No drift, leg holds 30 degree position for full 5 secs  6b. Motor Leg, Right: 0-->No drift, leg holds 30 degree position for full 5 secs  7. Limb Ataxia: 1-->Present in one limb  8. Sensory: 1-->Mild-to-moderate sensory loss, patient feels pinprick is less sharp or is dull on the affected side, or there is a loss of superficial pain with pinprick, but patient is aware of being touched  9. Best Language: 1-->Mild-to-moderate aphasia, some obvious loss of fluency or facility of comprehension, without significant limitation on ideas expressed or form of expression. Reduction of speech and/or comprehension, however, makes  conversation. . . (see row details)  10. Dysarthria: 0-->Normal  11. Extinction and Inattention (formerly Neglect): 0-->No abnormality    Total (NIH Stroke Scale): 5   Results Reviewed:  I have personally reviewed current lab, radiology, and data and agree with results.  WBC   Date Value Ref Range Status   01/17/2024 9.83 3.40 - 10.80 10*3/mm3 Preliminary     RBC   Date Value Ref Range Status   01/17/2024 4.89 4.14 - 5.80 10*6/mm3 Preliminary     Hemoglobin   Date Value Ref Range Status   01/17/2024 14.4 13.0 - 17.7 g/dL Preliminary   01/17/2024 14.3 12.0 - 17.0 g/dL Final     Comment:     Serial Number: [id number]:  [id number]     Hematocrit   Date Value Ref Range Status   01/17/2024 43.7 37.5 - 51.0 % Preliminary   01/17/2024 42 38 - 51 % Final     MCV   Date Value Ref Range Status   01/17/2024 89.4 79.0 - 97.0 fL Preliminary     MCH   Date Value Ref Range Status   01/17/2024 29.4 26.6 - 33.0 pg Preliminary     MCHC   Date Value Ref Range Status   01/17/2024 33.0 31.5 - 35.7 g/dL Preliminary     RDW   Date Value Ref Range Status   01/17/2024 14.1 12.3 - 15.4 % Preliminary     RDW-SD   Date Value Ref Range Status   01/17/2024 46.1 37.0 - 54.0 fl Preliminary     MPV   Date Value Ref Range Status   01/17/2024 13.6 (H) 6.0 - 12.0 fL Preliminary     Platelets   Date Value Ref Range Status   01/17/2024 162 140 - 450 10*3/mm3 Preliminary     nRBC   Date Value Ref Range Status   01/17/2024 0.0 0.0 - 0.2 /100 WBC Preliminary      Lab Results   Component Value Date    GLUCOSE 107 (H) 09/13/2023    BUN 14 09/13/2023    CREATININE 1.50 (H) 01/17/2024    EGFR 59.2 (L) 01/17/2024    BCR 11.6 09/13/2023    K 4.0 09/13/2023    CO2 31.0 (H) 09/13/2023    CALCIUM 9.5 09/13/2023    ALBUMIN 4.3 09/13/2023    BILITOT 0.3 09/13/2023    AST 14 01/17/2024    ALT 10 01/17/2024    XR Chest 1 View    Result Date: 1/17/2024  Impression: No evidence of acute cardiopulmonary disease. Electronically Signed: Jordan Castro MD  1/17/2024 7:23 AM  EST  Workstation ID: [id number]    CT Head Without Contrast Stroke Protocol    Result Date: 1/17/2024  Impression: Normal Electronically Signed: Ar Fitch MD  1/17/2024 6:53 AM EST  Workstation ID: [id number]        Assessment/Plan:  Mr. Swift is a 43-year-old male with PMH of anxiety, bipolar, DM, HTN, HLD, seizure disorder and EULOGIO.  He presents to [facility] ED with new onset of altered mental status and speech difficulty.  Wife reports patient's last known well of 1/16/2024 at 9:30 PM prior to him going to bed.  While she was getting ready this morning at 5:45 AM she heard him fall.  Ms. Swift found patient holding his head and in altered state.  Patient was brought to [facility] ED for further evaluation of presenting symptoms. Patient is not a candidate for IV thrombotic therapy due to last known well greater than 4.5 hours.  Patient is not a candidate for endovascular therapy due to incomplete scans.    Antiplatelet PTA: None  Anticoagulant PTA: None        Altered mental status and speech difficulty  Differentials to include TIA, CVA, seizure disorder  Initiate TIA/CVA without IV thrombolytic therapy order set  N.p.o. until bedside dysphagia screen complete by RN  Activity as tolerated  Aspirin 81 mg p.o. daily  Atorvastatin 80 mg p.o. nightly  Keppra 1 g IV x 1 now  Stat MRI brain without contrast  Stat MRA head and neck  EEG x 1 now  TTE routine  A1c, Lipid panel routine  Blood pressure goals, SBP less than 220  Diabetes educator to see if appropriate  PT/OT/SLP eval and treat  Case management to follow      Plan of care discussed with Dr. Wetzel, Dr. Rush, patient, family at bedside and primary RN.  Stroke neurology will continue to follow.  Thank you for this consult.  Call with any questions or concerns.      Odell Sow, APRN  January 17, 2024  07:44 EST

## 2024-01-17 NOTE — PLAN OF CARE
Goal Outcome Evaluation:                     Anticipated Discharge Disposition (SLP): home with assist, unknown    SLP Diagnosis: mild, dysarthria, mild-moderate, cognitive-linguistic disorder (01/17/24 4978)

## 2024-01-17 NOTE — THERAPY EVALUATION
Acute Care - Speech Language Pathology Initial Evaluation  [facility]  Cognitive-Communication Evaluation       Patient Name: Olivier Swift  : 1980  MRN: [id number]  Today's Date: 2024               Admit Date: 2024     Visit Dx:    ICD-10-CM ICD-9-CM   1. Confusion  R41.0 298.9   2. Closed head injury, initial encounter  S09.90XA 959.01   3. Accident due to mechanical fall without injury, initial encounter  W19.XXXA E888.9   4. Aphasia  R47.01 784.3   5. Cognitive communication deficit  R41.841 799.52   6. Dysarthria  R47.1 784.51     Patient Active Problem List   Diagnosis    Seizure    Primary hypertension    Type 2 diabetes mellitus with kidney complication, with long-term current use of insulin    Bipolar 1 disorder    Anxiety disorder    Fall     Past Medical History:   Diagnosis Date    Anxiety     Bipolar depression     Depression     Diabetes mellitus     Diabetic amyotrophy associated with type 2 diabetes mellitus     Hypertension     Leaky heart valve     Sleep apnea      History reviewed. No pertinent surgical history.    SLP Recommendation and Plan  SLP Diagnosis: mild, dysarthria, mild-moderate, cognitive-linguistic disorder (24 1505)  SLP Diagnosis Comments: Pt presents w/ mild dysarthria and mild to moderate cognitive linguistic deficits per this eval. Expressive/receptive language appears intact @ simple level. SLP will f/u for tx and dx tx as appropriate (24 150)        SLC Criteria for Skilled Therapy Interventions Met: yes (24 150)  Anticipated Discharge Disposition (SLP): home with assist, unknown (24 150)        Therapy Frequency (SLP SLC): 3 days per week (24 1505)  Predicted Duration Therapy Intervention (Days): until discharge (24 150)                                    SLP EVALUATION (last 72 hours)       SLP SLC Evaluation       Row Name 24 150                   Communication Assessment/Intervention    Document Type  evaluation  -        Subjective Information no complaints  -        Patient Observations alert;cooperative  -        Patient/Family/Caregiver Comments/Observations Family present  -        Patient Effort good  -        Symptoms Noted During/After Treatment none  -           General Information    Patient Profile Reviewed yes  -        Pertinent History Of Current Problem Adm w/ AMS. Hx: bipolar disorder, diabetes, HTN, anxiety. MRI: nonspecific hyperintensity in R parietal-occipital region  -        Precautions/Limitations, Vision WFL;for purposes of eval  -        Precautions/Limitations, Hearing WFL;for purposes of eval  -        Prior Level of Function-Communication unknown  -        Plans/Goals Discussed with patient;family;agreed upon  -        Barriers to Rehab none identified  -        Patient's Goals for Discharge patient did not state  -        Family Goals for Discharge family did not state  -           Pain    Additional Documentation Pain Scale: FACES Pre/Post-Treatment (Group)  -           Pain Scale: FACES Pre/Post-Treatment    Pain: FACES Scale, Pretreatment 0-->no hurt  -        Posttreatment Pain Rating 0-->no hurt  -           Comprehension Assessment/Intervention    Comprehension Assessment/Intervention Auditory Comprehension  -           Auditory Comprehension Assessment/Intervention    Auditory Comprehension (Communication) WFL  -           Expression Assessment/Intervention    Expression Assessment/Intervention verbal expression  -           Verbal Expression Assessment/Intervention    Verbal Expression [facility]  -           Motor Speech Assessment/Intervention    Motor Speech Function mild impairment  -        Characteristics Consistent with Dysarthria decreased intensity;decreased articulation  -           Cognitive Assessment Intervention- SLP    Cognitive Function (Cognition) moderate impairment  -        Orientation Status (Cognition) awareness  of basic personal information;person;place;situation;WFL;time;mild impairment  -        Memory (Cognitive) simple;immediate;mild impairment;moderate impairment  -        Attention (Cognitive) selective;sustained;moderate impairment  -        Thought Organization (Cognitive) concrete divergent;concrete convergent;moderate impairment  -        Reasoning (Cognitive) simple;moderate impairment  -MH        Problem Solving (Cognitive) simple;temporal;moderate impairment  -        Functional Math (Cognitive) simple;word problems;moderate impairment  -        Executive Function (Cognition) deficit awareness;WFL  -           SLP Evaluation Clinical Impressions    SLP Diagnosis mild;dysarthria;mild-moderate;cognitive-linguistic disorder  -        SLP Diagnosis Comments Pt presents w/ mild dysarthria and mild to moderate cognitive linguistic deficits per this eval. Expressive/receptive language appears intact @ simple level. SLP will f/u for tx and dx tx as appropriate  -        Rehab Potential/Prognosis good  -        SLC Criteria for Skilled Therapy Interventions Met yes  -        Functional Impact functional impact in ADLs;difficulty in expressing complex messages  -           Recommendations    Therapy Frequency (SLP SLC) 3 days per week  -        Predicted Duration Therapy Intervention (Days) until discharge  -        Anticipated Discharge Disposition (SLP) home with assist;unknown  -                  User Key  (r) = Recorded By, (t) = Taken By, (c) = Cosigned By      Initials Name Effective Dates     Patricia Leahy, MS CCC-SLP 05/12/23 -                        EDUCATION  The patient has been educated in the following areas:     Cognitive Impairment Communication Impairment.           SLP GOALS       Row Name 01/17/24 3475             Patient will demonstrate functional speech skills for return to discharge environment    [place] Independently  -      Time frame by discharge  -       Progress/Outcomes new goal  -         Patient will demonstrate functional cognitive-linguistic skills for return to discharge environment    [place] Independently  -      Time frame by discharge  -MH      Progress/Outcomes new goal  -         SLP Diagnostic Treatment     Patient will participate in further assessment in the following areas reading comprehension;graphic expression  -      Time Frame (Diagnostic) short term goal (STG)  -MH      Progress/Outcomes (Additional Goal 1, SLP) new goal  -         Phonation Goal 1 (SLP)    Improve Phonation By Goal 1 (SLP) using loud speech;80%;with minimal cues (75-90%)  -      Time Frame (Phonation Goal 1, SLP) short term goal (STG)  -MH      Progress/Outcomes (Phonation Goal 1, SLP) new goal  -         Articulation Goal 1 (SLP)    Improve Articulation Goal 1 (SLP) by over-articulating in connected speech;80%;with minimal cues (75-90%)  -      Time Frame (Articulation Goal 1, SLP) short term goal (STG)  -MH      Progress/Outcomes (Articulation Goal 1, SLP) new goal  -         Attention Goal 1 (SLP)    Improve Attention by Goal 1 (SLP) complete selective attention task;complete sustained attention task;80%;with minimal cues (75-90%)  -      Time Frame (Attention Goal 1, SLP) short term goal (STG)  -      Progress/Outcomes (Attention Goal 1, SLP) new goal  -         Orientation Goal 1 (SLP)    Improve Orientation Through Goal 1 (SLP) demonstrating orientation to day;demonstrating orientation to month;demonstrating orientation to year;demonstrating orientation to disease/impairment;80%;with minimal cues (75-90%)  -      Time Frame (Orientation Goal 1, SLP) short term goal (STG)  -      Progress/Outcomes (Orientation Goal 1, SLP) new goal  -         Memory Skills Goal 1 (SLP)    Improve Memory Skills Through Goal 1 (SLP) recalling related word lists immediately;recalling unrelated word lists immediately;80%;with minimal cues (75-90%)  -       Time Frame (Memory Skills Goal 1, SLP) short term goal (STG)  -MH      Progress/Outcomes (Memory Skills Goal 1, SLP) new goal  -MH         Organizational Skills Goal 1 (SLP)    Improve Thought Organization Through Goal 1 (SLP) completing a divergent naming task;completing a convergent naming task;80%;with minimal cues (75-90%)  -MH      Time Frame (Thought Organization Skills Goal 1, SLP) short term goal (STG)  -MH      Progress/Outcomes (Thought Organization Skills Goal 1, SLP) new goal  -MH                User Key  (r) = Recorded By, (t) = Taken By, (c) = Cosigned By      Initials Name Provider Type    Patricia Pino MS CCC-SLP Speech and Language Pathologist                            Time Calculation:      Time Calculation- SLP       Row Name 01/17/24 1746             Time Calculation- SLP    SLP Start Time 1505  -      SLP Received On 01/17/24  -         Untimed Charges    19758-OI Eval Speech and Production w/ Language Minutes 40  -MH         Total Minutes    Untimed Charges Total Minutes 40  -MH       Total Minutes 40  -MH                User Key  (r) = Recorded By, (t) = Taken By, (c) = Cosigned By      Initials Name Provider Type    Patricia Pino MS CCC-SLP Speech and Language Pathologist                    Therapy Charges for Today       Code Description Service Date Service Provider Modifiers Qty    [id number] HC ST EVAL SPEECH AND PROD W LANG  3 1/17/2024 Patricia Leahy MS CCC-SLP GN 1                       MS DIPIKA Rowley  1/17/2024

## 2024-01-17 NOTE — H&P
[facility] Medicine Services  HISTORY AND PHYSICAL    Patient Name: Olivier Swift  : 1980  MRN: [id number]  Primary Care Physician: Pastora Carbajal APRN  Date of admission: 2024      Subjective   Subjective     Chief Complaint:  AMS/fall    HPI:  Olivier Swift is a 43 y.o. male with a history of anxiety, bipolar disorder, diabetes, hypertension, EULOGIO presenting with acute onset of altered mental status.  History also provided by wife at bedside.  She states that early this morning she heard a fall and noted that her  had fallen in the bathroom.  He was slow to respond to questions and had some slurred speech afterwards so she brought him in worried about a stroke.  He has had falls in the past which they state is related to his diabetes.  He is on an insulin regimen at home.  He also endorses a headache for the past few days.  He gets headaches and this has been severe.currently he endorses a headache and weakness.  Wife states that his speech is still slurred      Personal History     Past Medical History:   Diagnosis Date    Anxiety     Bipolar depression     Depression     Diabetes mellitus     Diabetic amyotrophy associated with type 2 diabetes mellitus     Hypertension     Leaky heart valve     Sleep apnea            History reviewed. No pertinent surgical history.    Family History: family history includes Diabetes in his maternal grandmother.     Social History:  reports that he has been smoking cigars. He has never been exposed to tobacco smoke. He has never used smokeless tobacco. He reports that he does not drink alcohol and does not use drugs.  Social History     Social History Narrative    Not on file       Medications:  Available home medication information reviewed.  Propranolol HCl, QUEtiapine, Semaglutide (1 MG/DOSE), Semaglutide(0.25 or 0.5MG/DOS), acetaminophen, amLODIPine, atorvastatin, clomiPRAMINE, cyclobenzaprine, divalproex, gabapentin,  "hydroCHLOROthiazide, insulin detemir, lidocaine, lisinopril, meclizine, metFORMIN, metoprolol succinate XL, naproxen, prazosin, ranolazine, and sertraline    Allergies   Allergen Reactions    Trazodone Other (See Comments)     \" CAUSED ME TO HAVE AN ERECTION\"        Objective   Objective     Vital Signs:   Temp:  [97.8 °F (36.6 °C)] 97.8 °F (36.6 °C)  Heart Rate:  [59-76] 60  Resp:  [17] 17  BP: (122-157)/() 122/85  Total (NIH Stroke Scale): 5    Physical Exam   Constitutional: No acute distress, awake, alert, appears comfortable  HENT: NCAT, mucous membranes moist  Respiratory: Clear to auscultation bilaterally, respiratory effort normal   Cardiovascular: RRR, no murmurs, rubs, or gallops  Gastrointestinal: Positive bowel sounds, soft, nontender, nondistended  Musculoskeletal: No bilateral ankle edema  Psychiatric: Appropriate affect, cooperative  Neurologic: Oriented x 3, globally weak, 4/5 strength in all extremities, CN intact. Very slow to respond to questions and commands  Skin: No rashes      Result Review:  I have personally reviewed the results from the time of this admission to 1/17/2024 13:01 EST and agree with these findings:  [x]  Laboratory list / accordion  [x]  Microbiology  [x]  Radiology  [x]  EKG/Telemetry   []  Cardiology/Vascular   []  Pathology  []  Old records  []  Other:  Most notable findings include:   Elevated Cr  Non specific focus of R parietal occiptal region, no other acute intracranial abnormalities and no significant stenosis of ICA  EEG nl    LAB RESULTS:      Lab 01/17/24  0657 01/17/24  0656 01/17/24  0654 01/17/24  0653   WBC  --  9.83  --   --    HEMOGLOBIN  --  14.4  --   --    HEMOGLOBIN, POC  --   --  14.3  --    HEMATOCRIT  --  43.7  --   --    HEMATOCRIT POC  --   --  42  --    PLATELETS  --  162  --   --    NEUTROS ABS  --  4.49  --   --    IMMATURE GRANS (ABS)  --  0.03  --   --    LYMPHS ABS  --  4.23*  --   --    MONOS ABS  --  0.99*  --   --    EOS ABS  --  0.05  " --   --    MCV  --  89.4  --   --    LACTATE 1.7  --   --   --    PROTIME  --   --   --  19.5*   INR  --   --   --  1.7*   APTT  --  33.7  --   --          Lab 01/17/24  0654   CREATININE 1.50*   EGFR 59.2*         Lab 01/17/24  0657   ALT (SGPT) 10   AST (SGOT) 14         Lab 01/17/24  0657   HSTROP T 17                 UA          3/17/2023    18:18 9/13/2023    20:46   Urinalysis   Specific Gravity, UA  1.027    Ketones, UA Negative  Trace    Blood, UA  Negative    Leukocytes, UA Negative  Negative    Nitrite, UA  Negative        Microbiology Results (last 10 days)       ** No results found for the last 240 hours. **            MRI Brain Without Contrast    Result Date: 1/17/2024  MRI BRAIN WO CONTRAST, MRI ANGIOGRAM HEAD WO CONTRAST, MRI ANGIOGRAM NECK WO CONTRAST Date of Exam: 1/17/2024 10:49 AM EST Indication: Neuro deficit, acute, stroke suspected.  Comparison: None available. Technique:  Routine multiplanar/multisequence sequence images of the brain were obtained without contrast administration.  Routine 3-D time-of-flight gradient echo imaging was obtained of the head without contrast administration. Routine 3-D time-of-flight gradient echo imaging was obtained of the neck without contrast administration. FINDINGS: 10 x 5 mm focus of FLAIR signal hyperintensity within the right parietal occipital region (series 9, image 18). Midline structures appear unremarkable. No significant mass effect, intracranial hemorrhage, or hydrocephalus is identified. Diffusion-weighted sequences demonstrate no acute infarct.The visualized intracranial flow-voids appear unremarkable. The paranasal sinuses and mastoid air cells show no fluid signal. The orbits, globes, retrobulbar soft tissues appear unremarkable. The visualized superficial soft tissues and cervical spine demonstrate no significant abnormality.     Impression: 1.  10 x 5 mm focus of FLAIR signal hyperintensity within the right parietal occipital region (series  9, image 18). This finding is nonspecific and may be related to chronic microvascular ischemic change, postinfectious/postinflammatory states, demyelinating conditions, as well as other etiologies. If this location corresponds to patient's acute symptomatology, consider further evaluation with contrast-enhanced MRI of the brain. 2.No acute abnormality identified within the large arteries of the head or neck. 3.No significant stenosis of the bilateral internal carotid arteries. Electronically Signed: Felix Nelson MD  1/17/2024 12:12 PM EST  Workstation ID: [id number]    MRI Angiogram Head Without Contrast    Result Date: 1/17/2024  MRI BRAIN WO CONTRAST, MRI ANGIOGRAM HEAD WO CONTRAST, MRI ANGIOGRAM NECK WO CONTRAST Date of Exam: 1/17/2024 10:49 AM EST Indication: Neuro deficit, acute, stroke suspected.  Comparison: None available. Technique:  Routine multiplanar/multisequence sequence images of the brain were obtained without contrast administration.  Routine 3-D time-of-flight gradient echo imaging was obtained of the head without contrast administration. Routine 3-D time-of-flight gradient echo imaging was obtained of the neck without contrast administration. FINDINGS: 10 x 5 mm focus of FLAIR signal hyperintensity within the right parietal occipital region (series 9, image 18). Midline structures appear unremarkable. No significant mass effect, intracranial hemorrhage, or hydrocephalus is identified. Diffusion-weighted sequences demonstrate no acute infarct.The visualized intracranial flow-voids appear unremarkable. The paranasal sinuses and mastoid air cells show no fluid signal. The orbits, globes, retrobulbar soft tissues appear unremarkable. The visualized superficial soft tissues and cervical spine demonstrate no significant abnormality.     Impression: 1.  10 x 5 mm focus of FLAIR signal hyperintensity within the right parietal occipital region (series 9, image 18). This finding is nonspecific and may be  related to chronic microvascular ischemic change, postinfectious/postinflammatory states, demyelinating conditions, as well as other etiologies. If this location corresponds to patient's acute symptomatology, consider further evaluation with contrast-enhanced MRI of the brain. 2.No acute abnormality identified within the large arteries of the head or neck. 3.No significant stenosis of the bilateral internal carotid arteries. Electronically Signed: Felix Nelson MD  1/17/2024 12:12 PM EST  Workstation ID: [id number]    MRI Angiogram Neck Without Contrast    Result Date: 1/17/2024  MRI BRAIN WO CONTRAST, MRI ANGIOGRAM HEAD WO CONTRAST, MRI ANGIOGRAM NECK WO CONTRAST Date of Exam: 1/17/2024 10:49 AM EST Indication: Neuro deficit, acute, stroke suspected.  Comparison: None available. Technique:  Routine multiplanar/multisequence sequence images of the brain were obtained without contrast administration.  Routine 3-D time-of-flight gradient echo imaging was obtained of the head without contrast administration. Routine 3-D time-of-flight gradient echo imaging was obtained of the neck without contrast administration. FINDINGS: 10 x 5 mm focus of FLAIR signal hyperintensity within the right parietal occipital region (series 9, image 18). Midline structures appear unremarkable. No significant mass effect, intracranial hemorrhage, or hydrocephalus is identified. Diffusion-weighted sequences demonstrate no acute infarct.The visualized intracranial flow-voids appear unremarkable. The paranasal sinuses and mastoid air cells show no fluid signal. The orbits, globes, retrobulbar soft tissues appear unremarkable. The visualized superficial soft tissues and cervical spine demonstrate no significant abnormality.     Impression: 1.  10 x 5 mm focus of FLAIR signal hyperintensity within the right parietal occipital region (series 9, image 18). This finding is nonspecific and may be related to chronic microvascular ischemic change,  postinfectious/postinflammatory states, demyelinating conditions, as well as other etiologies. If this location corresponds to patient's acute symptomatology, consider further evaluation with contrast-enhanced MRI of the brain. 2.No acute abnormality identified within the large arteries of the head or neck. 3.No significant stenosis of the bilateral internal carotid arteries. Electronically Signed: Felix Nelson MD  1/17/2024 12:12 PM EST  Workstation ID: [id number]    EEG    Result Date: 1/17/2024  Reason for referral: 43 y.o.male with altered mental status, speech changes, consideration of seizure Technical Summary:  A 19 channel digital EEG was performed using the international 10-20 placement system, including eye leads and EKG leads. Duration: 21 minutes Findings: The patient is awake.  The background shows diffuse medium amplitude intermixed theta and alpha activity present symmetrically over both hemispheres.  A well-regulated 9 Hz posterior rhythm is evident symmetrically over the occipital and posterior parietal leads.  Low amplitude EMG artifact is noted anteriorly.  Drowsiness and light sleep are seen with mild slowing of the background.  No focal features or epileptiform activity are seen.  Photic stimulation does not change the background.  Hyperventilation is not performed. Video: Available Technical quality: Superior EKG: Regular, 70 bpm SUMMARY: Normal EEG in the awake and lightly asleep states No focal features or epileptiform activity are seen     Impression: Normal study This report is transcribed using the Dragon dictation system.      XR Chest 1 View    Result Date: 1/17/2024  XR CHEST 1 VW Date of Exam: 1/17/2024 7:11 AM EST Indication: Acute Stroke Protocol (onset < 12 hrs) Comparison: Chest radiograph 7/1/2022. Findings: Cardiomediastinal silhouette is within normal limits. No focal consolidation or overt pulmonary edema. No pleural effusion or pneumothorax. Osseous structures are  unremarkable.     Impression: Impression: No evidence of acute cardiopulmonary disease. Electronically Signed: Jordan Castro MD  1/17/2024 7:23 AM EST  Workstation ID: [id number]    CT Head Without Contrast Stroke Protocol    Result Date: 1/17/2024  CT HEAD WO CONTRAST STROKE PROTOCOL Date of Exam: 1/17/2024 6:46 AM EST Indication: Neuro deficit, acute, stroke suspected Neuro deficit, acute stroke suspected. Comparison: CT scan of the head dated July 1, 2022 Technique: Axial CT images were obtained of the head without contrast administration.  Reconstructed coronal images were also obtained. Automated exposure control and iterative construction methods were used. Scan Time: 6:47 a.m. Results discussed with stroke  [place] at 6:52 a.m. Findings: There is no evidence of hemorrhage. There is no mass effect or midline shift. There is no extracerebral collection. Ventricles are normal in size and configuration for patient's stated age.  Posterior fossa is within normal limits. Calvarium and skull base appear intact.   Visualized sinuses show no air fluid levels. Visualized orbits are unremarkable.     Impression: Impression: Normal Electronically Signed: Ar Fitch MD  1/17/2024 6:53 AM EST  Workstation ID: [id number]         Assessment & Plan   Assessment & Plan       Seizure    Primary hypertension    Type 2 diabetes mellitus with kidney complication, with long-term current use of insulin    Bipolar 1 disorder    Anxiety disorder    Fall        Olivier Swift is a 42 y/o M with a history of hypertension, diabetes, bipolar disorder, and EULOGIO who is presenting with acute altered mental status following a fall.  Due to altered mental status and slurred speech he is being admitted as a stroke alert and stroke team is following    Altered mental status  Slurred speech  Fall  -Etiologies include seizure versus TIA versus complex migraine  - So far brain imaging negative for any acute process.  There is a  nonspecific focus of the right parieto-occipital region.  - Stroke team following, appreciate recs.  Stroke order protocol set ordered    Diabetes  - Check A1c  --SSI for now   --monitor for hypoglycemia    HTN  --permissive HTN until normotensive ok'd by stroke    Elevated creatinine on CKD  - hold home diuretic/ACE  --avoid nephrotoxins  --repeat BMP AM    Anxiety  Bipolar disorder  --cont home meds          DVT prophylaxis:  No DVT prophylaxis order currently exists.      CODE STATUS:    Code Status and Medical Interventions:   Ordered at: 01/17/24 1259     Level Of Support Discussed With:    Patient     Code Status (Patient has no pulse and is not breathing):    CPR (Attempt to Resuscitate)     Medical Interventions (Patient has pulse or is breathing):    Full Support       Expected Discharge   Expected Discharge Date: 1/19/2024; Expected Discharge Time:      Destini Montes MD  01/17/24

## 2024-01-18 ENCOUNTER — APPOINTMENT (OUTPATIENT)
Dept: MRI IMAGING | Facility: HOSPITAL | Age: 44
End: 2024-01-18
Payer: COMMERCIAL

## 2024-01-18 LAB
ANION GAP SERPL CALCULATED.3IONS-SCNC: 5 MMOL/L (ref 5–15)
BASOPHILS # BLD AUTO: 0.04 10*3/MM3 (ref 0–0.2)
BASOPHILS NFR BLD AUTO: 0.5 % (ref 0–1.5)
BUN SERPL-MCNC: 12 MG/DL (ref 6–20)
BUN/CREAT SERPL: 11.7 (ref 7–25)
CALCIUM SPEC-SCNC: 8.6 MG/DL (ref 8.6–10.5)
CHLORIDE SERPL-SCNC: 107 MMOL/L (ref 98–107)
CHOLEST SERPL-MCNC: 54 MG/DL (ref 0–200)
CO2 SERPL-SCNC: 30 MMOL/L (ref 22–29)
CREAT SERPL-MCNC: 1.03 MG/DL (ref 0.76–1.27)
DEPRECATED RDW RBC AUTO: 45.2 FL (ref 37–54)
EGFRCR SERPLBLD CKD-EPI 2021: 93 ML/MIN/1.73
EOSINOPHIL # BLD AUTO: 0.07 10*3/MM3 (ref 0–0.4)
EOSINOPHIL NFR BLD AUTO: 0.9 % (ref 0.3–6.2)
ERYTHROCYTE [DISTWIDTH] IN BLOOD BY AUTOMATED COUNT: 14 % (ref 12.3–15.4)
GLUCOSE BLDC GLUCOMTR-MCNC: 101 MG/DL (ref 70–130)
GLUCOSE BLDC GLUCOMTR-MCNC: 119 MG/DL (ref 70–130)
GLUCOSE BLDC GLUCOMTR-MCNC: 77 MG/DL (ref 70–130)
GLUCOSE BLDC GLUCOMTR-MCNC: 79 MG/DL (ref 70–130)
GLUCOSE SERPL-MCNC: 83 MG/DL (ref 65–99)
HBA1C MFR BLD: 5.7 % (ref 4.8–5.6)
HCT VFR BLD AUTO: 40.7 % (ref 37.5–51)
HDLC SERPL-MCNC: 28 MG/DL (ref 40–60)
HGB BLD-MCNC: 13.5 G/DL (ref 13–17.7)
IMM GRANULOCYTES # BLD AUTO: 0.01 10*3/MM3 (ref 0–0.05)
IMM GRANULOCYTES NFR BLD AUTO: 0.1 % (ref 0–0.5)
LDLC SERPL CALC-MCNC: 9 MG/DL (ref 0–100)
LDLC/HDLC SERPL: 0.34 {RATIO}
LYMPHOCYTES # BLD AUTO: 3.46 10*3/MM3 (ref 0.7–3.1)
LYMPHOCYTES NFR BLD AUTO: 44.4 % (ref 19.6–45.3)
MCH RBC QN AUTO: 29.6 PG (ref 26.6–33)
MCHC RBC AUTO-ENTMCNC: 33.2 G/DL (ref 31.5–35.7)
MCV RBC AUTO: 89.3 FL (ref 79–97)
MONOCYTES # BLD AUTO: 0.69 10*3/MM3 (ref 0.1–0.9)
MONOCYTES NFR BLD AUTO: 8.8 % (ref 5–12)
NEUTROPHILS NFR BLD AUTO: 3.53 10*3/MM3 (ref 1.7–7)
NEUTROPHILS NFR BLD AUTO: 45.3 % (ref 42.7–76)
NRBC BLD AUTO-RTO: 0 /100 WBC (ref 0–0.2)
PLATELET # BLD AUTO: 161 10*3/MM3 (ref 140–450)
PMV BLD AUTO: 13.9 FL (ref 6–12)
POTASSIUM SERPL-SCNC: 4.2 MMOL/L (ref 3.5–5.2)
RBC # BLD AUTO: 4.56 10*6/MM3 (ref 4.14–5.8)
SODIUM SERPL-SCNC: 142 MMOL/L (ref 136–145)
TRIGL SERPL-MCNC: 82 MG/DL (ref 0–150)
VLDLC SERPL-MCNC: 17 MG/DL (ref 5–40)
WBC NRBC COR # BLD AUTO: 7.8 10*3/MM3 (ref 3.4–10.8)

## 2024-01-18 PROCEDURE — 99214 OFFICE O/P EST MOD 30 MIN: CPT | Performed by: STUDENT IN AN ORGANIZED HEALTH CARE EDUCATION/TRAINING PROGRAM

## 2024-01-18 PROCEDURE — 97535 SELF CARE MNGMENT TRAINING: CPT

## 2024-01-18 PROCEDURE — 97162 PT EVAL MOD COMPLEX 30 MIN: CPT

## 2024-01-18 PROCEDURE — 97165 OT EVAL LOW COMPLEX 30 MIN: CPT

## 2024-01-18 PROCEDURE — A9577 INJ MULTIHANCE: HCPCS | Performed by: INTERNAL MEDICINE

## 2024-01-18 PROCEDURE — 99232 SBSQ HOSP IP/OBS MODERATE 35: CPT | Performed by: INTERNAL MEDICINE

## 2024-01-18 PROCEDURE — 80061 LIPID PANEL: CPT

## 2024-01-18 PROCEDURE — G0378 HOSPITAL OBSERVATION PER HR: HCPCS

## 2024-01-18 PROCEDURE — 82948 REAGENT STRIP/BLOOD GLUCOSE: CPT

## 2024-01-18 PROCEDURE — 0 GADOBENATE DIMEGLUMINE 529 MG/ML SOLUTION: Performed by: INTERNAL MEDICINE

## 2024-01-18 PROCEDURE — 80048 BASIC METABOLIC PNL TOTAL CA: CPT | Performed by: STUDENT IN AN ORGANIZED HEALTH CARE EDUCATION/TRAINING PROGRAM

## 2024-01-18 PROCEDURE — 85025 COMPLETE CBC W/AUTO DIFF WBC: CPT | Performed by: STUDENT IN AN ORGANIZED HEALTH CARE EDUCATION/TRAINING PROGRAM

## 2024-01-18 PROCEDURE — 70552 MRI BRAIN STEM W/DYE: CPT

## 2024-01-18 PROCEDURE — 83036 HEMOGLOBIN GLYCOSYLATED A1C: CPT

## 2024-01-18 RX ADMIN — GADOBENATE DIMEGLUMINE 20 ML: 529 INJECTION, SOLUTION INTRAVENOUS at 20:59

## 2024-01-18 RX ADMIN — GABAPENTIN 300 MG: 300 CAPSULE ORAL at 08:52

## 2024-01-18 RX ADMIN — GABAPENTIN 300 MG: 300 CAPSULE ORAL at 21:33

## 2024-01-18 RX ADMIN — DIVALPROEX SODIUM 500 MG: 250 TABLET, DELAYED RELEASE ORAL at 16:44

## 2024-01-18 RX ADMIN — CLOMIPRAMINE HYDROCHLORIDE 25 MG: 25 CAPSULE ORAL at 22:25

## 2024-01-18 RX ADMIN — Medication 10 ML: at 21:35

## 2024-01-18 RX ADMIN — DIVALPROEX SODIUM 500 MG: 250 TABLET, DELAYED RELEASE ORAL at 21:33

## 2024-01-18 RX ADMIN — Medication 10 ML: at 08:56

## 2024-01-18 RX ADMIN — SERTRALINE HYDROCHLORIDE 200 MG: 100 TABLET ORAL at 08:52

## 2024-01-18 RX ADMIN — RANOLAZINE 500 MG: 500 TABLET, EXTENDED RELEASE ORAL at 21:34

## 2024-01-18 RX ADMIN — GABAPENTIN 300 MG: 300 CAPSULE ORAL at 16:44

## 2024-01-18 RX ADMIN — DIVALPROEX SODIUM 500 MG: 250 TABLET, DELAYED RELEASE ORAL at 08:52

## 2024-01-18 RX ADMIN — ASPIRIN 81 MG: 81 TABLET, CHEWABLE ORAL at 08:52

## 2024-01-18 RX ADMIN — RANOLAZINE 500 MG: 500 TABLET, EXTENDED RELEASE ORAL at 08:52

## 2024-01-18 RX ADMIN — SENNOSIDES AND DOCUSATE SODIUM 2 TABLET: 8.6; 5 TABLET ORAL at 08:52

## 2024-01-18 RX ADMIN — ATORVASTATIN CALCIUM 80 MG: 40 TABLET, FILM COATED ORAL at 21:34

## 2024-01-18 RX ADMIN — Medication 10 ML: at 21:34

## 2024-01-18 NOTE — NURSING NOTE
Orthostatic blood pressures obtained: Laying 122/85 HR 77, Sitting 118/85 HR 77, Standing 117/92 HR 73.

## 2024-01-18 NOTE — PLAN OF CARE
Goal Outcome Evaluation:  Plan of Care Reviewed With: patient, spouse           Outcome Evaluation: Pt presents with generalized weakness, difficulty walking, delayed processing with slow response to questions. Pt able to amb a short distance in hallway with CGA and RW, dem very slow gait speed, shuffling steps, and heavy use of UE's. PT rec d/c home with outpatient PT and RW.      Anticipated Discharge Disposition (PT): home with outpatient therapy services

## 2024-01-18 NOTE — PROGRESS NOTES
[facility] Medicine Services  PROGRESS NOTE    Patient Name: Olivier Swift  : 1980  MRN: [id number]    Date of Admission: 2024  Primary Care Physician: Pastora Carbajal APRN    Subjective   Subjective     CC:  Fall, speech difficulty    HPI:  No acute events. Wife thinks his speech is improving. HA improved per patient. Reviewed pending workup.       Objective   Objective     Vital Signs:   Temp:  [97.8 °F (36.6 °C)-98.8 °F (37.1 °C)] 98 °F (36.7 °C)  Heart Rate:  [65-94] 73  Resp:  [18-20] 20  BP: (109-134)/(73-92) 117/92     Physical Exam:  Constitutional: No acute distress, awake, alert  HENT: NCAT, mucous membranes moist  Respiratory: Clear to auscultation bilaterally, respiratory effort normal   Cardiovascular: RRR, no murmurs, rubs, or gallops  Gastrointestinal: Positive bowel sounds, soft, nontender, nondistended  Musculoskeletal: No bilateral ankle edema  Psychiatric: Appropriate affect, cooperative  Neurologic: Oriented x 3, strength symmetric in all extremities, Cranial Nerves grossly intact to confrontation, speech clear but slow  Skin: No rashes      Results Reviewed:  LAB RESULTS:      Lab 24  0633 24  0657 24  0656 24  0654 24  0653   WBC 7.80  --  9.83  --   --    HEMOGLOBIN 13.5  --  14.4  --   --    HEMOGLOBIN, POC  --   --   --  14.3  --    HEMATOCRIT 40.7  --  43.7  --   --    HEMATOCRIT POC  --   --   --  42  --    PLATELETS 161  --  162  --   --    NEUTROS ABS 3.53  --  4.49  --   --    IMMATURE GRANS (ABS) 0.01  --  0.03  --   --    LYMPHS ABS 3.46*  --  4.23*  --   --    MONOS ABS 0.69  --  0.99*  --   --    EOS ABS 0.07  --  0.05  --   --    MCV 89.3  --  89.4  --   --    LACTATE  --  1.7  --   --   --    PROTIME  --   --   --   --  19.5*   APTT  --   --  33.7  --   --          Lab 24  0633 24  0654   SODIUM 142  --    POTASSIUM 4.2  --    CHLORIDE 107  --    CO2 30.0*  --    ANION GAP 5.0  --    BUN 12  --     CREATININE 1.03 1.50*   EGFR 93.0 59.2*   GLUCOSE 83  --    CALCIUM 8.6  --    HEMOGLOBIN A1C 5.70*  --          Lab 01/17/24  0657   ALT (SGPT) 10   AST (SGOT) 14         Lab 01/17/24  0657 01/17/24  0653   HSTROP T 17  --    PROTIME  --  19.5*   INR  --  1.7*         Lab 01/18/24  0633   CHOLESTEROL 54   LDL CHOL 9   HDL CHOL 28*   TRIGLYCERIDES 82             Brief Urine Lab Results  (Last result in the past 365 days)        Color   Clarity   Blood   Leuk Est   Nitrite   Protein   CREAT   Urine HCG        09/13/23 2046 Yellow   Clear   Negative   Negative   Negative   Negative                   Microbiology Results Abnormal       None            Adult Transthoracic Echo Complete W/ Cont if Necessary Per Protocol (With Agitated Saline)    Result Date: 1/17/2024    Left ventricular ejection fraction appears to be 56 - 60%.   Saline test results are negative.   Estimated right ventricular systolic pressure from tricuspid regurgitation is normal (<35 mmHg). Calculated right ventricular systolic pressure from tricuspid regurgitation is 14 mmHg.     MRI Brain Without Contrast    Addendum Date: 1/17/2024    ADDENDUM #1 The following should be included in the FINDINGS section: The common carotid, internal carotid, middle cerebral, anterior cerebral, posterior cerebral, basilar, and vertebral arteries demonstrate normal flow signal without abrupt cut off or significant stenosis. No intracranial aneurysm is seen. Electronically Signed: Felix Nelson MD  1/17/2024 6:17 PM EST  Workstation ID: [id number] ORIGINAL REPORT: MRI BRAIN WO CONTRAST, MRI ANGIOGRAM HEAD WO CONTRAST, MRI ANGIOGRAM NECK WO CONTRAST Date of Exam: 1/17/2024 10:49 AM EST Indication: Neuro deficit, acute, stroke suspected.  Comparison: None available. Technique:  Routine multiplanar/multisequence sequence images of the brain were obtained without contrast administration.  Routine 3-D time-of-flight gradient echo imaging was obtained of the head without  contrast administration. Routine 3-D time-of-flight gradient echo imaging was obtained of the neck without contrast administration. FINDINGS: 10 x 5 mm focus of FLAIR signal hyperintensity within the right parietal occipital region (series 9, image 18). Midline structures appear unremarkable. No significant mass effect, intracranial hemorrhage, or hydrocephalus is identified. Diffusion-weighted sequences demonstrate no acute infarct.The visualized intracranial flow-voids appear unremarkable. The paranasal sinuses and mastoid air cells show no fluid signal. The orbits, globes, retrobulbar soft tissues appear unremarkable. The visualized superficial soft tissues and cervical spine demonstrate no significant abnormality. IMPRESSION: 1.  10 x 5 mm focus of FLAIR signal hyperintensity within the right parietal occipital region (series 9, image 18). This finding is nonspecific and may be related to chronic microvascular ischemic change, postinfectious/postinflammatory states, demyelinating conditions, as well as other etiologies. If this location corresponds to patient's acute symptomatology, consider further evaluation with contrast-enhanced MRI of the brain. 2.No acute abnormality identified within the large arteries of the head or neck. 3.No significant stenosis of the bilateral internal carotid arteries. Electronically Signed: Felix Nelson MD  1/17/2024 12:12 PM EST  Workstation ID: [id number]    Result Date: 1/17/2024  MRI BRAIN WO CONTRAST, MRI ANGIOGRAM HEAD WO CONTRAST, MRI ANGIOGRAM NECK WO CONTRAST Date of Exam: 1/17/2024 10:49 AM EST Indication: Neuro deficit, acute, stroke suspected.  Comparison: None available. Technique:  Routine multiplanar/multisequence sequence images of the brain were obtained without contrast administration.  Routine 3-D time-of-flight gradient echo imaging was obtained of the head without contrast administration. Routine 3-D time-of-flight gradient echo imaging was obtained of the  neck without contrast administration. FINDINGS: 10 x 5 mm focus of FLAIR signal hyperintensity within the right parietal occipital region (series 9, image 18). Midline structures appear unremarkable. No significant mass effect, intracranial hemorrhage, or hydrocephalus is identified. Diffusion-weighted sequences demonstrate no acute infarct.The visualized intracranial flow-voids appear unremarkable. The paranasal sinuses and mastoid air cells show no fluid signal. The orbits, globes, retrobulbar soft tissues appear unremarkable. The visualized superficial soft tissues and cervical spine demonstrate no significant abnormality.     Impression: 1.  10 x 5 mm focus of FLAIR signal hyperintensity within the right parietal occipital region (series 9, image 18). This finding is nonspecific and may be related to chronic microvascular ischemic change, postinfectious/postinflammatory states, demyelinating conditions, as well as other etiologies. If this location corresponds to patient's acute symptomatology, consider further evaluation with contrast-enhanced MRI of the brain. 2.No acute abnormality identified within the large arteries of the head or neck. 3.No significant stenosis of the bilateral internal carotid arteries. Electronically Signed: Felix Nelson MD  1/17/2024 12:12 PM EST  Workstation ID: [id number]    MRI Angiogram Head Without Contrast    Addendum Date: 1/17/2024    ADDENDUM #1 The following should be included in the FINDINGS section: The common carotid, internal carotid, middle cerebral, anterior cerebral, posterior cerebral, basilar, and vertebral arteries demonstrate normal flow signal without abrupt cut off or significant stenosis. No intracranial aneurysm is seen. Electronically Signed: Felix Nelson MD  1/17/2024 6:17 PM EST  Workstation ID: [id number] ORIGINAL REPORT: MRI BRAIN WO CONTRAST, MRI ANGIOGRAM HEAD WO CONTRAST, MRI ANGIOGRAM NECK WO CONTRAST Date of Exam: 1/17/2024 10:49 AM EST Indication:  Neuro deficit, acute, stroke suspected.  Comparison: None available. Technique:  Routine multiplanar/multisequence sequence images of the brain were obtained without contrast administration.  Routine 3-D time-of-flight gradient echo imaging was obtained of the head without contrast administration. Routine 3-D time-of-flight gradient echo imaging was obtained of the neck without contrast administration. FINDINGS: 10 x 5 mm focus of FLAIR signal hyperintensity within the right parietal occipital region (series 9, image 18). Midline structures appear unremarkable. No significant mass effect, intracranial hemorrhage, or hydrocephalus is identified. Diffusion-weighted sequences demonstrate no acute infarct.The visualized intracranial flow-voids appear unremarkable. The paranasal sinuses and mastoid air cells show no fluid signal. The orbits, globes, retrobulbar soft tissues appear unremarkable. The visualized superficial soft tissues and cervical spine demonstrate no significant abnormality. IMPRESSION: 1.  10 x 5 mm focus of FLAIR signal hyperintensity within the right parietal occipital region (series 9, image 18). This finding is nonspecific and may be related to chronic microvascular ischemic change, postinfectious/postinflammatory states, demyelinating conditions, as well as other etiologies. If this location corresponds to patient's acute symptomatology, consider further evaluation with contrast-enhanced MRI of the brain. 2.No acute abnormality identified within the large arteries of the head or neck. 3.No significant stenosis of the bilateral internal carotid arteries. Electronically Signed: Felix Nelson MD  1/17/2024 12:12 PM EST  Workstation ID: [id number]    Result Date: 1/17/2024  MRI BRAIN WO CONTRAST, MRI ANGIOGRAM HEAD WO CONTRAST, MRI ANGIOGRAM NECK WO CONTRAST Date of Exam: 1/17/2024 10:49 AM EST Indication: Neuro deficit, acute, stroke suspected.  Comparison: None available. Technique:  Routine  multiplanar/multisequence sequence images of the brain were obtained without contrast administration.  Routine 3-D time-of-flight gradient echo imaging was obtained of the head without contrast administration. Routine 3-D time-of-flight gradient echo imaging was obtained of the neck without contrast administration. FINDINGS: 10 x 5 mm focus of FLAIR signal hyperintensity within the right parietal occipital region (series 9, image 18). Midline structures appear unremarkable. No significant mass effect, intracranial hemorrhage, or hydrocephalus is identified. Diffusion-weighted sequences demonstrate no acute infarct.The visualized intracranial flow-voids appear unremarkable. The paranasal sinuses and mastoid air cells show no fluid signal. The orbits, globes, retrobulbar soft tissues appear unremarkable. The visualized superficial soft tissues and cervical spine demonstrate no significant abnormality.     Impression: 1.  10 x 5 mm focus of FLAIR signal hyperintensity within the right parietal occipital region (series 9, image 18). This finding is nonspecific and may be related to chronic microvascular ischemic change, postinfectious/postinflammatory states, demyelinating conditions, as well as other etiologies. If this location corresponds to patient's acute symptomatology, consider further evaluation with contrast-enhanced MRI of the brain. 2.No acute abnormality identified within the large arteries of the head or neck. 3.No significant stenosis of the bilateral internal carotid arteries. Electronically Signed: Felix Nelson MD  1/17/2024 12:12 PM EST  Workstation ID: [id number]    MRI Angiogram Neck Without Contrast    Addendum Date: 1/17/2024    ADDENDUM #1 The following should be included in the FINDINGS section: The common carotid, internal carotid, middle cerebral, anterior cerebral, posterior cerebral, basilar, and vertebral arteries demonstrate normal flow signal without abrupt cut off or significant stenosis.  No intracranial aneurysm is seen. Electronically Signed: Felix Nelson MD  1/17/2024 6:17 PM EST  Workstation ID: [id number] ORIGINAL REPORT: MRI BRAIN WO CONTRAST, MRI ANGIOGRAM HEAD WO CONTRAST, MRI ANGIOGRAM NECK WO CONTRAST Date of Exam: 1/17/2024 10:49 AM EST Indication: Neuro deficit, acute, stroke suspected.  Comparison: None available. Technique:  Routine multiplanar/multisequence sequence images of the brain were obtained without contrast administration.  Routine 3-D time-of-flight gradient echo imaging was obtained of the head without contrast administration. Routine 3-D time-of-flight gradient echo imaging was obtained of the neck without contrast administration. FINDINGS: 10 x 5 mm focus of FLAIR signal hyperintensity within the right parietal occipital region (series 9, image 18). Midline structures appear unremarkable. No significant mass effect, intracranial hemorrhage, or hydrocephalus is identified. Diffusion-weighted sequences demonstrate no acute infarct.The visualized intracranial flow-voids appear unremarkable. The paranasal sinuses and mastoid air cells show no fluid signal. The orbits, globes, retrobulbar soft tissues appear unremarkable. The visualized superficial soft tissues and cervical spine demonstrate no significant abnormality. IMPRESSION: 1.  10 x 5 mm focus of FLAIR signal hyperintensity within the right parietal occipital region (series 9, image 18). This finding is nonspecific and may be related to chronic microvascular ischemic change, postinfectious/postinflammatory states, demyelinating conditions, as well as other etiologies. If this location corresponds to patient's acute symptomatology, consider further evaluation with contrast-enhanced MRI of the brain. 2.No acute abnormality identified within the large arteries of the head or neck. 3.No significant stenosis of the bilateral internal carotid arteries. Electronically Signed: Felix Nelson MD  1/17/2024 12:12 PM EST   Workstation ID: [id number]    Result Date: 1/17/2024  MRI BRAIN WO CONTRAST, MRI ANGIOGRAM HEAD WO CONTRAST, MRI ANGIOGRAM NECK WO CONTRAST Date of Exam: 1/17/2024 10:49 AM EST Indication: Neuro deficit, acute, stroke suspected.  Comparison: None available. Technique:  Routine multiplanar/multisequence sequence images of the brain were obtained without contrast administration.  Routine 3-D time-of-flight gradient echo imaging was obtained of the head without contrast administration. Routine 3-D time-of-flight gradient echo imaging was obtained of the neck without contrast administration. FINDINGS: 10 x 5 mm focus of FLAIR signal hyperintensity within the right parietal occipital region (series 9, image 18). Midline structures appear unremarkable. No significant mass effect, intracranial hemorrhage, or hydrocephalus is identified. Diffusion-weighted sequences demonstrate no acute infarct.The visualized intracranial flow-voids appear unremarkable. The paranasal sinuses and mastoid air cells show no fluid signal. The orbits, globes, retrobulbar soft tissues appear unremarkable. The visualized superficial soft tissues and cervical spine demonstrate no significant abnormality.     Impression: 1.  10 x 5 mm focus of FLAIR signal hyperintensity within the right parietal occipital region (series 9, image 18). This finding is nonspecific and may be related to chronic microvascular ischemic change, postinfectious/postinflammatory states, demyelinating conditions, as well as other etiologies. If this location corresponds to patient's acute symptomatology, consider further evaluation with contrast-enhanced MRI of the brain. 2.No acute abnormality identified within the large arteries of the head or neck. 3.No significant stenosis of the bilateral internal carotid arteries. Electronically Signed: Felix Nelson MD  1/17/2024 12:12 PM EST  Workstation ID: [id number]    EEG    Result Date: 1/17/2024  Reason for referral: 43 y.o.male  with altered mental status, speech changes, consideration of seizure Technical Summary:  A 19 channel digital EEG was performed using the international 10-20 placement system, including eye leads and EKG leads. Duration: 21 minutes Findings: The patient is awake.  The background shows diffuse medium amplitude intermixed theta and alpha activity present symmetrically over both hemispheres.  A well-regulated 9 Hz posterior rhythm is evident symmetrically over the occipital and posterior parietal leads.  Low amplitude EMG artifact is noted anteriorly.  Drowsiness and light sleep are seen with mild slowing of the background.  No focal features or epileptiform activity are seen.  Photic stimulation does not change the background.  Hyperventilation is not performed. Video: Available Technical quality: Superior EKG: Regular, 70 bpm SUMMARY: Normal EEG in the awake and lightly asleep states No focal features or epileptiform activity are seen     Impression: Normal study This report is transcribed using the Dragon dictation system.      XR Chest 1 View    Result Date: 1/17/2024  XR CHEST 1 VW Date of Exam: 1/17/2024 7:11 AM EST Indication: Acute Stroke Protocol (onset < 12 hrs) Comparison: Chest radiograph 7/1/2022. Findings: Cardiomediastinal silhouette is within normal limits. No focal consolidation or overt pulmonary edema. No pleural effusion or pneumothorax. Osseous structures are unremarkable.     Impression: Impression: No evidence of acute cardiopulmonary disease. Electronically Signed: Jordan Castro MD  1/17/2024 7:23 AM EST  Workstation ID: [id number]    CT Head Without Contrast Stroke Protocol    Result Date: 1/17/2024  CT HEAD WO CONTRAST STROKE PROTOCOL Date of Exam: 1/17/2024 6:46 AM EST Indication: Neuro deficit, acute, stroke suspected Neuro deficit, acute stroke suspected. Comparison: CT scan of the head dated July 1, 2022 Technique: Axial CT images were obtained of the head without contrast administration.   Reconstructed coronal images were also obtained. Automated exposure control and iterative construction methods were used. Scan Time: 6:47 a.m. Results discussed with stroke  [place] at 6:52 a.m. Findings: There is no evidence of hemorrhage. There is no mass effect or midline shift. There is no extracerebral collection. Ventricles are normal in size and configuration for patient's stated age.  Posterior fossa is within normal limits. Calvarium and skull base appear intact.   Visualized sinuses show no air fluid levels. Visualized orbits are unremarkable.     Impression: Impression: Normal Electronically Signed: Ar Fitch MD  1/17/2024 6:53 AM EST  Workstation ID: [id number]     Results for orders placed during the hospital encounter of 01/17/24    Adult Transthoracic Echo Complete W/ Cont if Necessary Per Protocol (With Agitated Saline)    Interpretation Summary    Left ventricular ejection fraction appears to be 56 - 60%.    Saline test results are negative.    Estimated right ventricular systolic pressure from tricuspid regurgitation is normal (<35 mmHg). Calculated right ventricular systolic pressure from tricuspid regurgitation is 14 mmHg.      Current medications:  Scheduled Meds:aspirin, 81 mg, Oral, Daily   Or  aspirin, 300 mg, Rectal, Daily  atorvastatin, 80 mg, Oral, Nightly  clomiPRAMINE, 25 mg, Oral, Nightly  divalproex, 500 mg, Oral, TID  gabapentin, 300 mg, Oral, TID  insulin lispro, 2-7 Units, Subcutaneous, 4x Daily AC & at Bedtime  ranolazine, 500 mg, Oral, Q12H  senna-docusate sodium, 2 tablet, Oral, BID  sertraline, 200 mg, Oral, Daily  sodium chloride, 10 mL, Intravenous, Q12H  sodium chloride, 10 mL, Intravenous, Q12H      Continuous Infusions:   PRN Meds:.  acetaminophen    senna-docusate sodium **AND** polyethylene glycol **AND** bisacodyl **AND** bisacodyl    cyclobenzaprine    dextrose    dextrose    glucagon (human recombinant)    sodium chloride    sodium chloride    sodium  chloride    sodium chloride    sodium chloride    Assessment & Plan   Assessment & Plan     Active Hospital Problems    Diagnosis  POA    **Seizure [R56.9]  Yes    Primary hypertension [I10]  Unknown    Type 2 diabetes mellitus with kidney complication, with long-term current use of insulin [E11.29, Z79.4]  Not Applicable    Bipolar 1 disorder [F31.9]  Unknown    Anxiety disorder [F41.9]  Unknown    Fall [W19.XXXA]  Unknown      Resolved Hospital Problems   No resolved problems to display.        Brief Hospital Course to date:  Olivier Swift is a 44 y/o M with a history of hypertension, diabetes, bipolar disorder, and EULOGIO who is presenting with acute altered mental status following a fall. CT head normal. MRI brain with 10x5 mm focus of FLAIR hyperintensity within the right parietal occipital region.      Altered mental status  Slurred speech  Fall  -Etiologies include seizure versus TIA versus complex migraine; fall related to dysautonomia resulting in concussion/confusion  - imaging per above  - EEG normal  - Neurology consulted -- appreciate recs   - Recommend continuing depakote 500 mg TID   - MRI brain with contrast pending  - Orthostatics     Diabetes  - A1C 5.7  --SSI for now   --monitor for hypoglycemia     HTN  --BP stable -- holding amlodipine, HCTZ, lisinopril, prazosin      Elevated creatinine on CKD  - hold home diuretic/ACE  --avoid nephrotoxins  - improved      Anxiety  Bipolar disorder  --cont home meds    Expected Discharge Location and Transportation: home  Expected Discharge   Expected Discharge Date: 1/19/2024; Expected Discharge Time:      DVT prophylaxis:  Mechanical DVT prophylaxis orders are present.     AM-PAC 6 Clicks Score (PT): 20 (01/18/24 1030)    CODE STATUS:   Code Status and Medical Interventions:   Ordered at: 01/17/24 1259     Level Of Support Discussed With:    Patient     Code Status (Patient has no pulse and is not breathing):    CPR (Attempt to Resuscitate)     Medical  Interventions (Patient has pulse or is breathing):    Full Support       Tara Rueda DO  01/18/24

## 2024-01-18 NOTE — CONSULTS
"                  Clinical Nutrition   Nutrition Support Assessment  Reason for Visit: Consult/NSG admit screen      Patient Name: Olivier Swift  YOB: 1980  MRN: [id number]  Date of Encounter: 01/18/24 11:05 EST  Admission date: 1/17/2024    Comments:    Nutrition Assessment   Admission Diagnosis:  Seizure [R56.9]      Problem List:    Seizure    Primary hypertension    Type 2 diabetes mellitus with kidney complication, with long-term current use of insulin    Bipolar 1 disorder    Anxiety disorder    Fall        PMH:   He  has a past medical history of Anxiety, Bipolar depression, Depression, Diabetes mellitus, Diabetic amyotrophy associated with type 2 diabetes mellitus, Hypertension, Leaky heart valve, and Sleep apnea.    PSH:  He  has no past surgical history on file.    Applicable Nutrition Concerns:   Skin:  Oral:  GI:    Applicable Interval History:     Reported/Observed/Food/Nutrition Related History:     Able to obtain some nutrition hx from pt, wife also at bedside assists w/hx. Pt denies wt changes or appetite/intake changes PTA. Pt's wife reports [id number]. Pt states he eats 3 regular meals daily and does not drink/need ONS at this time. Pt denies chewing or swallowing difficulty. NKFA.     Anthropometrics     Flowsheet Rows      Flowsheet Row First Filed Value   Admission Height 165.1 cm (65\") Documented at 01/17/2024 0638   Admission Weight 107 kg (235 lb) Documented at 01/17/2024 0638          Height: Height: 165.1 cm (65\")  Last Filed Weight: Weight: 107 kg (235 lb 14.3 oz) (01/17/24 1453)  Method: Weight Method: Bed scale  BMI: BMI (Calculated): 39.3  BMI classification: Obese Class II: 35-39.9kg/m2  IBW:  62kg    UBW:  220-230lbs per wife report  Weight change:      Weight       Weight (kg) Weight (lbs) Weight Method Visit Report   3/17/2023 90.719 kg  200 lb      3/26/2023 102.059 kg  225 lb  Stated     6/5/2023 103.42 kg  228 lb   --    6/8/2023 104.872 kg  231 lb 3.2 oz   " --    8/9/2023 104.781 kg  231 lb   --    9/13/2023 99.791 kg  220 lb  Stated     1/17/2024 107 kg  235 lb 14.3 oz  Bed scale      100.88 kg  222 lb 6.4 oz  Stated      106.595 kg  235 lb        Nutrition Focused Physical Exam     Date:     1/18    NFPE completed, patient does not meet criteria for malnutrition diagnosis at this time.     Current Nutrition Prescription   PO: Diet: Cardiac Diets; Healthy Heart (2-3 Na+); Texture: Regular Texture (IDDSI 7); Fluid Consistency: Thin (IDDSI 0)  Oral Nutrition Supplement:   Intake: Insufficient data 100% x 1 meal noted    Nutrition Diagnosis   Date:  1/18            Updated:    Problem No nutrition diagnosis at this time   Etiology    Signs/Symptoms    Status:     Goal:   General: Maintain nutrition  PO: Establish PO  EN/PN: N/A    Nutrition Intervention      Follow treatment progress, Care plan reviewed    RD to provide ONS if warranted, pending establishment of po intakes.     Monitoring/Evaluation:   Per protocol, PO intake      Rakel Bonner MS,RD,LD  Time Spent: 20

## 2024-01-18 NOTE — PLAN OF CARE
Goal Outcome Evaluation:  Plan of Care Reviewed With: patient        Progress: no change  Outcome Evaluation: Baseline for ADL completion limited by strength, balance, and cognitive deficits warranting skilled IP OT services to promote return to PLOF. Delayed processing and slow guarded gait during ADL related mobility. Provided resource sheet and recommend bath seating in home environment for safer transfers and showering tasks. Rec d/c to home with assist, endorse PT rec for OPPT.      Anticipated Discharge Disposition (OT): home with assist, home with outpatient therapy services, other (see comments) (PT)

## 2024-01-18 NOTE — THERAPY EVALUATION
Patient Name: Olivier Swift  : 1980    MRN: [id number]                              Today's Date: 2024       Admit Date: 2024    Visit Dx:     ICD-10-CM ICD-9-CM   1. Confusion  R41.0 298.9   2. Closed head injury, initial encounter  S09.90XA 959.01   3. Accident due to mechanical fall without injury, initial encounter  W19.XXXA E888.9   4. Aphasia  R47.01 784.3   5. Cognitive communication deficit  R41.841 799.52   6. Dysarthria  R47.1 784.51   7. Impaired functional mobility, balance, gait, and endurance  Z74.09 V49.89     Patient Active Problem List   Diagnosis    Seizure    Primary hypertension    Type 2 diabetes mellitus with kidney complication, with long-term current use of insulin    Bipolar 1 disorder    Anxiety disorder    Fall     Past Medical History:   Diagnosis Date    Anxiety     Bipolar depression     Depression     Diabetes mellitus     Diabetic amyotrophy associated with type 2 diabetes mellitus     Hypertension     Leaky heart valve     Sleep apnea      History reviewed. No pertinent surgical history.   General Information       Row Name 24          Physical Therapy Time and Intention    Document Type evaluation  -SD     Mode of Treatment individual therapy;physical therapy  -SD       Row Name 24          General Information    Patient Profile Reviewed yes  -SD     Prior Level of Function independent:;all household mobility;community mobility;gait;transfer;bed mobility;ADL's  pt reports independent with cane, hx of multiple recent falls  -SD     Existing Precautions/Restrictions fall;seizures  -SD     Barriers to Rehab cognitive status;ineffective coping;impaired sensation;medically complex  -SD       Row Name 24          Living Environment    People in Home spouse  -SD       Row Name 24          Home Main Entrance    Number of Stairs, Main Entrance other (see comments)  8 + 10 NIKKI  -SD       Row Name 24          Stairs  Within Home, Primary    Number of Stairs, Within Home, Primary none  -SD       Row Name 01/18/24 0917          Cognition    Orientation Status (Cognition) oriented to;person;disoriented to;place;situation;time  Pt only stated that he was in a hospital in [place]  -SD       Row Name 01/18/24 0917          Safety Issues, Functional Mobility    Safety Issues Affecting Function (Mobility) problem-solving;safety precaution awareness;sequencing abilities;insight into deficits/self-awareness;judgment;positioning of assistive device  -SD     Impairments Affecting Function (Mobility) balance;cognition;coordination;strength;sensation/sensory awareness;pain;motor control  -SD     Comment, Safety Issues/Impairments (Mobility) Delayed processing, slow to respond to questions/commands  -SD               User Key  (r) = Recorded By, (t) = Taken By, (c) = Cosigned By      Initials Name Provider Type    Mis Garcia, PT Physical Therapist                   Mobility       Row Name 01/18/24 1020          Bed Mobility    Bed Mobility supine-sit  -SD     Supine-Sit [place] (Bed Mobility) standby assist  -SD     Assistive Device (Bed Mobility) bed rails;head of bed elevated  -SD     Comment, (Bed Mobility) pt able to complete on his own with extra time and effort  -SD       Row Name 01/18/24 1020          Transfers    Comment, (Transfers) [place] for safety  -SD       Row Name 01/18/24 1020          Sit-Stand Transfer    Sit-Stand [place] (Transfers) contact guard  -SD     Assistive Device (Sit-Stand Transfers) walker, front-wheeled  -SD       Row Name 01/18/24 1020          Gait/Stairs (Locomotion)    [place] Level (Gait) contact guard;1 person assist;verbal cues  -SD     Assistive Device (Gait) walker, front-wheeled  -SD     Distance in Feet (Gait) 50ft  -SD     Deviations/Abnormal Patterns (Gait) bilateral deviations;base of support, wide;owen decreased;festinating/shuffling;gait speed decreased  -SD      Bilateral Gait Deviations heel strike decreased;forward flexed posture  -SD     Comment, (Gait/Stairs) Pt amb a short distance in hallway with RW, dem very slow gait speed and shuffling steps. Pt dem heavy use of UE's on RW and needed assist to steer walker during turns.  -SD               User Key  (r) = Recorded By, (t) = Taken By, (c) = Cosigned By      Initials Name Provider Type    SD Mis Ashley, PT Physical Therapist                   Obj/Interventions       Row Name 01/18/24 1024          Range of Motion Comprehensive    General Range of Motion lower extremity range of motion deficits identified  -SD       Row Name 01/18/24 1024          Strength Comprehensive (MMT)    General Manual Muscle Testing (MMT) Assessment lower extremity strength deficits identified  -SD     Comment, General Manual Muscle Testing (MMT) Assessment BLE's 4-/5. Question submaximal effort.  -SD               User Key  (r) = Recorded By, (t) = Taken By, (c) = Cosigned By      Initials Name Provider Type    SD Mis Ashley, PT Physical Therapist                   Goals/Plan       Row Name 01/18/24 1029          Bed Mobility Goal 1 (PT)    Activity/Assistive Device (Bed Mobility Goal 1, PT) sit to supine;supine to sit  -SD     [place] Level/Cues Needed (Bed Mobility Goal 1, PT) independent  -SD     Time Frame (Bed Mobility Goal 1, PT) long term goal (LTG);2 weeks  -SD       Row Name 01/18/24 1029          Transfer Goal 1 (PT)    Activity/Assistive Device (Transfer Goal 1, PT) sit-to-stand/stand-to-sit;bed-to-chair/chair-to-bed  -SD     [place] Level/Cues Needed (Transfer Goal 1, PT) modified independence  -SD     Time Frame (Transfer Goal 1, PT) long term goal (LTG);2 weeks  -SD       Row Name 01/18/24 1029          Gait Training Goal 1 (PT)    Activity/Assistive Device (Gait Training Goal 1, PT) gait (walking locomotion);improve balance and speed;walker, rolling  or cane  -SD     [place] Level (Gait  Training Goal 1, PT) modified independence  -SD     Distance (Gait Training Goal 1, PT) 300  -SD     Time Frame (Gait Training Goal 1, PT) long term goal (LTG);2 weeks  -SD       Row Name 01/18/24 1029          Stairs Goal 1 (PT)    Activity/Assistive Device (Stairs Goal 1, PT) ascending stairs;descending stairs  -SD     [place]/Cues Needed (Stairs Goal 1, PT) standby assist  -SD     Number of Stairs (Stairs Goal 1, PT) 18  -SD     Time Frame (Stairs Goal 1, PT) long term goal (LTG);2 weeks  -SD       Row Name 01/18/24 1029          Therapy Assessment/Plan (PT)    Planned Therapy Interventions (PT) balance training;bed mobility training;gait training;home exercise program;patient/family education;neuromuscular re-education;transfer training;strengthening;stair training  -SD               User Key  (r) = Recorded By, (t) = Taken By, (c) = Cosigned By      Initials Name Provider Type    Mis Garcia, PT Physical Therapist                   Clinical Impression       Row Name 01/18/24 1025          Pain    Additional Documentation Pain Scale: FACES Pre/Post-Treatment (Group)  -SD       [facility] Name 01/18/24 1025          Pain Scale: FACES Pre/Post-Treatment    Pain: FACES Scale, Pretreatment 4-->hurts little more  -SD     Posttreatment Pain Rating 4-->hurts little more  -SD     Pain Location - back  -SD       Row Name 01/18/24 1025          Plan of Care Review    Plan of Care Reviewed With patient;spouse  -SD     Outcome Evaluation Pt presents with generalized weakness, difficulty walking, delayed processing with slow response to questions. Pt able to amb a short distance in hallway with CGA and RW, dem very slow gait speed, shuffling steps, and heavy use of UE's. PT rec d/c home with outpatient PT and RW.  -SD       Row Name 01/18/24 1025          Therapy Assessment/Plan (PT)    Patient/Family Therapy Goals Statement (PT) return home  -SD     Rehab Potential (PT) good, to achieve stated therapy goals   -SD     Criteria for Skilled Interventions Met (PT) yes;skilled treatment is necessary  -SD     Therapy Frequency (PT) daily  -SD     Predicted Duration of Therapy Intervention (PT) 2wks  -SD       Row Name 01/18/24 1025          Vital Signs    Pre Systolic BP Rehab 121  -SD     Pre Treatment Diastolic BP 86  -SD     Post Systolic BP Rehab 134  -SD     Post Treatment Diastolic BP 83  -SD     Pretreatment Heart Rate (beats/min) 75  -SD     Posttreatment Heart Rate (beats/min) 70  -SD     Pre SpO2 (%) 94  -SD     O2 Delivery Pre Treatment room air  -SD     Post SpO2 (%) 95  -SD     O2 Delivery Post Treatment room air  -SD     Pre Patient Position Supine  -SD     Post Patient Position Sitting  -SD       Row Name 01/18/24 1025          Positioning and Restraints    Pre-Treatment Position in bed  -SD     Post Treatment Position chair  -SD     In Chair notified nsg;reclined;call light within reach;encouraged to call for assist;exit alarm on;with family/caregiver;waffle cushion  -SD               User Key  (r) = Recorded By, (t) = Taken By, (c) = Cosigned By      Initials Name Provider Type    Mis Garcia, PT Physical Therapist                   Outcome Measures       Row Name 01/18/24 1030 01/18/24 0800       How much help from another person do you currently need...    Turning from your back to your side while in flat bed without using bedrails? 4  -SD 4  -PB    Moving from lying on back to sitting on the side of a flat bed without bedrails? 4  -SD 4  -PB    Moving to and from a bed to a chair (including a wheelchair)? 3  -SD 3  -PB    Standing up from a chair using your arms (e.g., wheelchair, bedside chair)? 3  -SD 3  -PB    Climbing 3-5 steps with a railing? 3  -SD 3  -PB    To walk in hospital room? 3  -SD 4  -PB    AM-PAC 6 Clicks Score (PT) 20  -SD 21  -PB    Highest Level of Mobility Goal 6 --> Walk 10 steps or more  -SD 6 --> Walk 10 steps or more  -PB      Row Name 01/18/24 1030          Modified  [place] Scale    Pre-Stroke Modified [place] Scale 3 - Moderate disability.  Requiring some help, but able to walk without assistance.  -SD       Row Name 01/18/24 1030          Functional Assessment    Outcome Measure Options AM-PAC 6 Clicks Basic Mobility (PT);Modified [place]  -SD               User Key  (r) = Recorded By, (t) = Taken By, (c) = Cosigned By      Initials Name Provider Type    Mis Garcia, MINA Physical Therapist    Evon Higgins RN Registered Nurse                                   PT Recommendation and Plan  Planned Therapy Interventions (PT): balance training, bed mobility training, gait training, home exercise program, patient/family education, neuromuscular re-education, transfer training, strengthening, stair training  Plan of Care Reviewed With: patient, spouse  Outcome Evaluation: Pt presents with generalized weakness, difficulty walking, delayed processing with slow response to questions. Pt able to amb a short distance in hallway with CGA and RW, dem very slow gait speed, shuffling steps, and heavy use of UE's. PT rec d/c home with outpatient PT and RW.     Time Calculation:   PT Evaluation Complexity  History, PT Evaluation Complexity: 3 or more personal factors and/or comorbidities  Examination of Body Systems (PT Eval Complexity): total of 3 or more elements  Clinical Presentation (PT Evaluation Complexity): evolving  Clinical Decision Making (PT Evaluation Complexity): moderate complexity  Overall Complexity (PT Evaluation Complexity): moderate complexity     PT Charges       Row Name 01/18/24 1032             Time Calculation    Start Time 0917  -SD      PT Non-Billable Time (min) 55 min  -SD      PT Received On 01/18/24  -SD      PT Goal Re-Cert Due Date 01/28/24  -SD                User Key  (r) = Recorded By, (t) = Taken By, (c) = Cosigned By      Initials Name Provider Type    Mis Garcia, PT Physical Therapist                  Therapy Charges for Today        Code Description Service Date Service Provider Modifiers Qty    69386566194 HC PT EVAL MOD COMPLEXITY 4 1/18/2024 Mis Ashley, PT GP 1            PT G-Codes  Outcome Measure Options: AM-PAC 6 Clicks Basic Mobility (PT), Modified Sudha  AM-PAC 6 Clicks Score (PT): 20  PT Discharge Summary  Anticipated Discharge Disposition (PT): home with outpatient therapy services    Mis Ashley, PT  1/18/2024

## 2024-01-18 NOTE — PLAN OF CARE
Goal Outcome Evaluation:              Outcome Evaluation: Patient A&O x3 with confusion about the month and year. X1 staff has assisted with him ambulating to the restroom, gait steady. Blood glucoses have been between  today. He has consumed all of his meal and apple juice between meals without any problems. NIHSS 7 today MD aware and is repeating MRI with contrast. Has alarms on and in place with call light within reach.

## 2024-01-18 NOTE — THERAPY EVALUATION
Patient Name: Olivier Swift  : 1980    MRN: [id number]                              Today's Date: 2024       Admit Date: 2024    Visit Dx:     ICD-10-CM ICD-9-CM   1. Confusion  R41.0 298.9   2. Closed head injury, initial encounter  S09.90XA 959.01   3. Accident due to mechanical fall without injury, initial encounter  W19.XXXA E888.9   4. Aphasia  R47.01 784.3   5. Cognitive communication deficit  R41.841 799.52   6. Dysarthria  R47.1 784.51   7. Impaired functional mobility, balance, gait, and endurance  Z74.09 V49.89     Patient Active Problem List   Diagnosis    Seizure    Primary hypertension    Type 2 diabetes mellitus with kidney complication, with long-term current use of insulin    Bipolar 1 disorder    Anxiety disorder    Fall     Past Medical History:   Diagnosis Date    Anxiety     Bipolar depression     Depression     Diabetes mellitus     Diabetic amyotrophy associated with type 2 diabetes mellitus     Hypertension     Leaky heart valve     Sleep apnea      History reviewed. No pertinent surgical history.   General Information       Row Name 24 1014          OT Time and Intention    Document Type evaluation  -CS     Mode of Treatment occupational therapy  -CS       Row Name 24 1014          General Information    Patient Profile Reviewed yes  -CS     Prior Level of Function independent:;all household mobility;ADL's  Spouse reports assist prn and encourages Pt to wait for her to be present for bathing tasks. No DME reported, SPC for mobility prn.  -CS     Existing Precautions/Restrictions fall;seizures  -CS     Barriers to Rehab medically complex  -CS       Row Name 24 1014          Living Environment    People in Home spouse  -CS       Row Name 24 1014          Home Main Entrance    Number of Stairs, Main Entrance eight;ten;other (see comments)  8 + 10  -CS       Row Name 24 1014          Stairs Within Home, Primary    Number of Stairs, Within Home,  Primary none  -CS       Row Name 01/18/24 1014          Cognition    Orientation Status (Cognition) oriented to;person  -CS       Row Name 01/18/24 1014          Safety Issues, Functional Mobility    Safety Issues Affecting Function (Mobility) safety precautions follow-through/compliance;insight into deficits/self-awareness;safety precaution awareness  -CS     Impairments Affecting Function (Mobility) balance;cognition;strength;sensation/sensory awareness  -CS     Cognitive Impairments, Mobility Safety/Performance insight into deficits/self-awareness  -CS     Comment, Safety Issues/Impairments (Mobility) delayed processing and slow/guarded movements this date  -CS               User Key  (r) = Recorded By, (t) = Taken By, (c) = Cosigned By      Initials Name Provider Type    CS Faheem Cates, OT Occupational Therapist                     Mobility/ADL's       Row Name 01/18/24 1016          Bed Mobility    Bed Mobility supine-sit;scooting/bridging  -CS     Scooting/Bridging [place] (Bed Mobility) supervision  -     Supine-Sit [place] (Bed Mobility) standby assist  -CS     Assistive Device (Bed Mobility) bed rails;head of bed elevated  -CS     Comment, (Bed Mobility) no dizziness reported upon sitting  -       Row Name 01/18/24 1016          Transfers    Transfers sit-stand transfer;stand-sit transfer  -       Row Name 01/18/24 1016          Sit-Stand Transfer    Sit-Stand [place] (Transfers) contact guard  -     Assistive Device (Sit-Stand Transfers) walker, front-wheeled  -       Row Name 01/18/24 1016          Functional Mobility    Functional Mobility- Comment defer to PT for specifics  -       Row Name 01/18/24 1016          Activities of Daily Living    BADL Assessment/Intervention lower body dressing;feeding;upper body dressing  -       Row Name 01/18/24 1016          Lower Body Dressing Assessment/Training    [place] Level (Lower Body Dressing)  don;pants/bottoms;shoes/slippers;supervision;contact guard assist  -CS     Comment, (Lower Body Dressing) CGA for dynamic STS  -CS       Row Name 01/18/24 1016          Upper Body Dressing Assessment/Training    [place] Level (Upper Body Dressing) pajama/robe;set up  -CS     Position (Upper Body Dressing) edge of bed sitting  -CS               User Key  (r) = Recorded By, (t) = Taken By, (c) = Cosigned By      Initials Name Provider Type    CS Faheem Cates OT Occupational Therapist                   Obj/Interventions       Row Name 01/18/24 1019          Sensory Assessment (Somatosensory)    Sensory Assessment (Somatosensory) bilateral UE  -CS     Bilateral UE Sensory Assessment general sensation;intact;light touch localization;impaired  -CS       Row Name 01/18/24 1019          Range of Motion Comprehensive    General Range of Motion bilateral upper extremity ROM WFL  -CS       Row Name 01/18/24 1019          Motor Skills    Motor Skills coordination;functional endurance  -CS     Functional Endurance moderate  -CS     Motor Control/Coordination Interventions occupation/activity based treatment  -CS       Row Name 01/18/24 1019          Balance    Balance Assessment sitting static balance;sitting dynamic balance;standing static balance;standing dynamic balance  -CS     Static Sitting Balance supervision  -CS     Dynamic Sitting Balance standby assist  -CS     Position, Sitting Balance unsupported;sitting edge of bed  -CS     Static Standing Balance standby assist  -CS     Dynamic Standing Balance contact guard  -CS     Position/Device Used, Standing Balance supported;walker, front-wheeled  -CS     Balance Interventions sitting;sit to stand;standing;occupation based/functional task  -CS               User Key  (r) = Recorded By, (t) = Taken By, (c) = Cosigned By      Initials Name Provider Type    CS Faheem Cates OT Occupational Therapist                   Goals/Plan       Row Name 01/18/24 1036           Transfer Goal 1 (OT)    Activity/Assistive Device (Transfer Goal 1, OT) bed-to-chair/chair-to-bed;toilet  -CS     [place] Level/Cues Needed (Transfer Goal 1, OT) supervision required;modified independence  -CS     Time Frame (Transfer Goal 1, OT) long term goal (LTG);10 days  -CS     Progress/Outcome (Transfer Goal 1, OT) new goal  -CS       Row Name 01/18/24 1034          Toileting Goal 1 (OT)    Activity/Device (Toileting Goal 1, OT) adjust/manage clothing;perform perineal hygiene  -CS     [place] Level/Cues Needed (Toileting Goal 1, OT) independent  -CS     Time Frame (Toileting Goal 1, OT) long term goal (LTG);10 days  -CS     Progress/Outcome (Toileting Goal 1, OT) new goal  -CS       Row Name 01/18/24 1034          Grooming Goal 1 (OT)    Activity/Device (Grooming Goal 1, OT) hair care;oral care;wash face, hands  -CS     [place] (Grooming Goal 1, OT) supervision required  -CS     Time Frame (Grooming Goal 1, OT) long term goal (LTG);10 days  -CS     Strategies/Barriers (Grooming Goal 1, OT) sinkside, safety with AD  -CS     Progress/Outcome (Grooming Goal 1, OT) new goal  -CS       Row Name 01/18/24 1034          Strength Goal 1 (OT)    Strength Goal 1 (OT) Pt will increase gross BUE strength 1 muscle grade to promote increased safety/Ind with ADL related transfers  -CS     Time Frame (Strength Goal 1, OT) long term goal (LTG);10 days  -CS     Progress/Outcome (Strength Goal 1, OT) new goal  -CS       Row Name 01/18/24 1034          Therapy Assessment/Plan (OT)    Planned Therapy Interventions (OT) activity tolerance training;functional balance retraining;occupation/activity based interventions;ROM/therapeutic exercise;strengthening exercise;transfer/mobility retraining;patient/caregiver education/training;neuromuscular control/coordination retraining;adaptive equipment training  -CS               User Key  (r) = Recorded By, (t) = Taken By, (c) = Cosigned By      Initials Name Provider Type     CS Faheem Cates, OT Occupational Therapist                   Clinical Impression       Row Name 01/18/24 1022          Pain Assessment    Additional Documentation Pain Scale: FACES Pre/Post-Treatment (Group)  -       Row Name 01/18/24 1022          Pain Scale: FACES Pre/Post-Treatment    Pain: FACES Scale, Pretreatment 0-->no hurt  -CS     Posttreatment Pain Rating 0-->no hurt  -CS       Row Name 01/18/24 1022          Plan of Care Review    Plan of Care Reviewed With patient  -CS     Progress no change  -CS     Outcome Evaluation Baseline for ADL completion limited by strength, balance, and cognitive deficits warranting skilled IP OT services to promote return to PLOF. Delayed processing and slow guarded gait during ADL related mobility. Provided resource sheet and recommend bath seating in home environment for safer transfers and showering tasks. Rec d/c to home with assist, endorse PT rec for OPPT.  -       Row Name 01/18/24 1022          Therapy Assessment/Plan (OT)    Rehab Potential (OT) good, to achieve stated therapy goals  -CS     Criteria for Skilled Therapeutic Interventions Met (OT) yes;meets criteria;skilled treatment is necessary  -     Therapy Frequency (OT) daily  -       Row Name 01/18/24 1022          Therapy Plan Review/Discharge Plan (OT)    Anticipated Discharge Disposition (OT) home with assist;home with outpatient therapy services;other (see comments)  PT  -CS       Row Name 01/18/24 1022          Vital Signs    Pre Systolic BP Rehab 121  -CS     Pre Treatment Diastolic BP 86  -CS     Post Systolic BP Rehab 134  -CS     Post Treatment Diastolic BP 83  -CS     O2 Delivery Pre Treatment room air  -CS     O2 Delivery Intra Treatment room air  -CS     O2 Delivery Post Treatment room air  -CS     Pre Patient Position Supine  -CS     Intra Patient Position Standing  -CS     Post Patient Position Sitting  -CS       Row Name 01/18/24 1022          Positioning and Restraints     Pre-Treatment Position in bed  -CS     Post Treatment Position chair  -CS     In Chair notified nsg;reclined;sitting;call light within reach;encouraged to call for assist;exit alarm on;waffle cushion;legs elevated  -CS               User Key  (r) = Recorded By, (t) = Taken By, (c) = Cosigned By      Initials Name Provider Type    CS Faheem Cates, OT Occupational Therapist                   Outcome Measures       Row Name 01/18/24 1035          How much help from another is currently needed...    Putting on and taking off regular lower body clothing? 3  -CS     Bathing (including washing, rinsing, and drying) 3  -CS     Toileting (which includes using toilet bed pan or urinal) 3  -CS     Putting on and taking off regular upper body clothing 4  -CS     Taking care of personal grooming (such as brushing teeth) 3  -CS     Eating meals 4  -CS     AM-PAC 6 Clicks Score (OT) 20  -CS       Row Name 01/18/24 1030 01/18/24 0800       How much help from another person do you currently need...    Turning from your back to your side while in flat bed without using bedrails? 4  -SD 4  -PB    Moving from lying on back to sitting on the side of a flat bed without bedrails? 4  -SD 4  -PB    Moving to and from a bed to a chair (including a wheelchair)? 3  -SD 3  -PB    Standing up from a chair using your arms (e.g., wheelchair, bedside chair)? 3  -SD 3  -PB    Climbing 3-5 steps with a railing? 3  -SD 3  -PB    To walk in hospital room? 3  -SD 4  -PB    AM-PAC 6 Clicks Score (PT) 20  -SD 21  -PB    Highest Level of Mobility Goal 6 --> Walk 10 steps or more  -SD 6 --> Walk 10 steps or more  -PB      Row Name 01/18/24 1035 01/18/24 1030       Modified Sudha Scale    Pre-Stroke Modified [place] Scale -- 3 - Moderate disability.  Requiring some help, but able to walk without assistance.  -SD    Modified [place]ha Scale 2 - Slight disability.  Unable to carry out all previous activities but able to look after own affairs without  assistance.  - --      Row Name 01/18/24 1035 01/18/24 1030       Functional Assessment    Outcome Measure Options Modified [place];AM-PAC 6 Clicks Daily Activity (OT)  - AM-PAC 6 Clicks Basic Mobility (PT);Modified [place]  -SD              User Key  (r) = Recorded By, (t) = Taken By, (c) = Cosigned By      Initials Name Provider Type    Mis Garcia, PT Physical Therapist    Faheem Rowell, OT Occupational Therapist    Evon Higgins RN Registered Nurse                      OT Recommendation and Plan  Planned Therapy Interventions (OT): activity tolerance training, functional balance retraining, occupation/activity based interventions, ROM/therapeutic exercise, strengthening exercise, transfer/mobility retraining, patient/caregiver education/training, neuromuscular control/coordination retraining, adaptive equipment training  Therapy Frequency (OT): daily  Plan of Care Review  Plan of Care Reviewed With: patient  Progress: no change  Outcome Evaluation: Baseline for ADL completion limited by strength, balance, and cognitive deficits warranting skilled IP OT services to promote return to PLOF. Delayed processing and slow guarded gait during ADL related mobility. Provided resource sheet and recommend bath seating in home environment for safer transfers and showering tasks. Rec d/c to home with assist, endorse PT rec for OPPT.     Time Calculation:   Evaluation Complexity (OT)  Review Occupational Profile/Medical/Therapy History Complexity: expanded/moderate complexity  Assessment, Occupational Performance/Identification of Deficit Complexity: 1-3 performance deficits  Clinical Decision Making Complexity (OT): problem focused assessment/low complexity  Overall Complexity of Evaluation (OT): low complexity     Time Calculation- OT       Row Name 01/18/24 1012             Time Calculation- OT    OT Start Time 0935  -      OT Received On 01/18/24  -      OT Goal Re-Cert Due Date 01/28/24  -          Timed Charges    38416 - OT Self Care/Mgmt Minutes 10  -CS         Untimed Charges    OT Eval/Re-eval Minutes 35  -CS         Total Minutes    Timed Charges Total Minutes 10  -CS      Untimed Charges Total Minutes 35  -CS       Total Minutes 45  -CS                User Key  (r) = Recorded By, (t) = Taken By, (c) = Cosigned By      Initials Name Provider Type    CS Faheem Cates, OT Occupational Therapist                  Therapy Charges for Today       Code Description Service Date Service Provider Modifiers Qty    02322909091 HC OT SELF CARE/MGMT/TRAIN EA 15 MIN 1/18/2024 Faheem Cates, OT GO 1    [id number] HC OT EVAL LOW COMPLEXITY 3 1/18/2024 Faheem Cates OT GO 1                 Faheem Cates OT  1/18/2024

## 2024-01-18 NOTE — CONSULTS
Diabetes Education    Patient Name:  Olivier Swift  YOB: 1980  MRN: [id number]  Admit Date:  1/17/2024      Diabetes ed consult rec'd per stroke protocol. Chart reviewed. Current A1c 5.7. Met w/ pt at bedside briefly this morning, he reports he takes levemir insulin daily, as well as ozempic weekly and metformin. He is unable to recall all of his doses readily. Does endorse frequent low blood sugars in the MOTN, but wears Cat CGM that has helped. Provider at bedside to see pt, defer further education attempts at this time due to pt unable to appropriately answer all orientation questions per exam.  Will cont to follow and reassess for appropriate time for education. If there are interval needs, plesae call [phone]. Thank you!     TTS reviewing chart, preparing education materials, and at bedside w/ pt approx 20 minutes.     Electronically signed by:  Arielle Griffith RN  01/18/24 11:35 EST

## 2024-01-18 NOTE — CONSULTS
[facility]  Consult Note    Patient Name: Olivier Swift  : 1980  MRN: [id number]  Primary Care Physician:  Pastora Carbajal APRN  Referring Physician: No ref. provider found  Date of admission: 2024    Subjective     Reason for Consult: AMS and speech disturbance, history of seizures    Olivier Swift is a 43 y.o. male with history of anxiety, bipolar, depression, type 2 diabetes, hypertension, sleep apnea, cardiac valve disease who presented after a fall.    Patient presented to [facility] ED after his wife found him on the bathroom floor.  Last known well was approximately  around 10 or 11 PM.  On the morning of  his wife heard a fall and found him in the bathroom on the floor rubbing his head as if he had struck his head.  Reportedly has had multiple falls due to his diabetes.    Patient remembers going to the bathroom and falling forward on his forehead.  He said he will get spots in his vision, tunnel vision, no sensation of cold sweat and shakiness prior to falls.  He thinks that he is usually down for a few seconds and then returns to baseline within a couple of minutes.  Denies bowel or bladder incontinence, tongue bite.    He was initially evaluated by the stroke team in the ER where NIH was 5.  CT head was unremarkable.  There was concern this episode was possible seizure and he was given IV Keppra 1 g.    He denies a history of seizures, he takes Depakote for his bipolar disorder.    Interestingly, his story changes a couple of times.  He reports he takes care of his own medications then later is unable to clarify what medications he is taking, doses and then states his wife handles his medications.    Review Of Systems   Constitutional:   Cardiovascular: Negative for chest pain or palpitations.  Respiratory: Negative for shortness of breath or cough.  Gastrointestinal: Negative for nausea and vomiting.  Genitourinary: Negative for bladder incontinence.  Musculoskeletal:  Negative for aches and pains in the muscles or joints.  Dermatological: Negative for skin breakdown.   Neurological: Positive for headache, numbness, tingling. Negative for pain, or vision changes.     Personal History     Past Medical History:   Diagnosis Date    Anxiety     Bipolar depression     Depression     Diabetes mellitus     Diabetic amyotrophy associated with type 2 diabetes mellitus     Hypertension     Leaky heart valve     Sleep apnea        History reviewed. No pertinent surgical history.    Family History: family history includes Diabetes in his maternal grandmother. Otherwise pertinent FHx was reviewed and not pertinent to current issue.    Social History:  reports that he has been smoking cigars. He has never been exposed to tobacco smoke. He has never used smokeless tobacco. He reports that he does not drink alcohol and does not use drugs.    Home Medications:   Propranolol HCl, QUEtiapine, Semaglutide (1 MG/DOSE), Semaglutide(0.25 or 0.5MG/DOS), acetaminophen, amLODIPine, atorvastatin, clomiPRAMINE, cyclobenzaprine, divalproex, gabapentin, hydroCHLOROthiazide, insulin detemir, lidocaine, lisinopril, meclizine, metFORMIN, metoprolol succinate XL, naproxen, prazosin, ranolazine, and sertraline    Current Medications:     Current Facility-Administered Medications:     acetaminophen (TYLENOL) tablet 650 mg, 650 mg, Oral, Q4H PRN, Destini Montes MD    aspirin chewable tablet 81 mg, 81 mg, Oral, Daily, 81 mg at 01/18/24 0852 **OR** aspirin suppository 300 mg, 300 mg, Rectal, Daily, Odell Sow APRN    atorvastatin (LIPITOR) tablet 80 mg, 80 mg, Oral, Nightly, Odell oSw APRN, 80 mg at 01/17/24 2036    sennosides-docusate (PERICOLACE) 8.6-50 MG per tablet 2 tablet, 2 tablet, Oral, BID, 2 tablet at 01/18/24 0852 **AND** polyethylene glycol (MIRALAX) packet 17 g, 17 g, Oral, Daily PRN **AND** bisacodyl (DULCOLAX) EC tablet 5 mg, 5 mg, Oral, Daily PRN **AND** bisacodyl (DULCOLAX) suppository 10 mg,  "10 mg, Rectal, Daily PRN, Destini Montes MD    clomiPRAMINE (ANAFRANIL) capsule 25 mg, 25 mg, Oral, Nightly, Destini Montes MD, 25 mg at 01/17/24 2035    cyclobenzaprine (FLEXERIL) tablet 10 mg, 10 mg, Oral, TID PRN, Destini Montes MD    dextrose (D50W) (25 g/50 mL) IV injection 25 g, 25 g, Intravenous, Q15 Min PRN, Destini Montes MD    dextrose (GLUTOSE) oral gel 15 g, 15 g, Oral, Q15 Min PRN, Destini Montes MD    divalproex (DEPAKOTE) DR tablet 500 mg, 500 mg, Oral, TID, Destini Montes MD, 500 mg at 01/18/24 0852    gabapentin (NEURONTIN) capsule 300 mg, 300 mg, Oral, TID, Destini Montes MD, 300 mg at 01/18/24 0852    glucagon (GLUCAGEN) injection 1 mg, 1 mg, Intramuscular, Q15 Min PRN, Destini Montes MD    Insulin Lispro (humaLOG) injection 2-7 Units, 2-7 Units, Subcutaneous, 4x Daily AC & at Bedtime, Destini Montes MD    ranolazine (RANEXA) 12 hr tablet 500 mg, 500 mg, Oral, Q12H, Destini Montes MD, 500 mg at 01/18/24 0852    sertraline (ZOLOFT) tablet 200 mg, 200 mg, Oral, Daily, Destini Montes MD, 200 mg at 01/18/24 0852    sodium chloride 0.9 % flush 10 mL, 10 mL, Intravenous, PRN, Felipe Wetzel MD    sodium chloride 0.9 % flush 10 mL, 10 mL, Intravenous, Q12H, Odell Sow APRN, 10 mL at 01/18/24 0856    sodium chloride 0.9 % flush 10 mL, 10 mL, Intravenous, PRN, Odell Sow APRN    sodium chloride 0.9 % flush 10 mL, 10 mL, Intravenous, Q12H, Destini Montes MD, 10 mL at 01/18/24 0856    sodium chloride 0.9 % flush 10 mL, 10 mL, Intravenous, PRN, Destini Montes MD    sodium chloride 0.9 % infusion 40 mL, 40 mL, Intravenous, PRN, Odell Sow APRN    sodium chloride 0.9 % infusion 40 mL, 40 mL, Intravenous, PRN, Destini Montes MD     Allergies:  Allergies   Allergen Reactions    Trazodone Other (See Comments)     \" CAUSED ME TO HAVE AN ERECTION\"        Objective     Vitals:  Temp:  [97.8 °F (36.6 °C)-98.8 °F (37.1 °C)] 97.8 °F (36.6 °C)  Heart Rate:  [58-94] 77  Resp:  [18] 18  BP: (109-136)/(73-94) " 121/86    Neurologic Exam   Mental status/Cognition: alert; oriented to person, age, knows he is in the hospital but does not know the name of the hospital.  He is unable to say city, month, year.  He is aware that it is cold outside and wintertime.  He does not know what city he lives in ([place]).  Unable to do the days of the week backwards  Speech/language: fluent; difficulty with two-step commands.    Cranial nerves: EOMI. PERRL. Face appears symmetric. No dysarthria.  Tongue protrudes midline.  Diminished sensation to light touch, pinprick in the left V1, V2, V3.    Motor: No drift in any extremity.     Sensation: Diminished to pinprick, light touch, cold on the left arm and left leg.  Diminished vibratory sensation in the bilateral great toes and medial malleolus.  Absent proprioception at the bilateral great toes, though most of these appear to be guesses.  Questionable midline splitting of pinprick on the face.    Reflexes: 2+ at bilateral biceps, brachioradialis, patellas, achilles. Toes down.     Coordination/Complex Motor: Finger-to-nose intact bilaterally without dysmetria        Laboratory Results:   Lab Results   Component Value Date    GLUCOSE 83 01/18/2024    CALCIUM 8.6 01/18/2024     01/18/2024    K 4.2 01/18/2024    CO2 30.0 (H) 01/18/2024     01/18/2024    BUN 12 01/18/2024    CREATININE 1.03 01/18/2024    EGFRIFAFRI 96 11/11/2021    BCR 11.7 01/18/2024    ANIONGAP 5.0 01/18/2024     Lab Results   Component Value Date    WBC 7.80 01/18/2024    HGB 13.5 01/18/2024    HCT 40.7 01/18/2024    MCV 89.3 01/18/2024     01/18/2024     Lab Results   Component Value Date    CHOL 54 01/18/2024    CHOL 86 10/02/2020     Lab Results   Component Value Date    HDL 28 (L) 01/18/2024    HDL 27 (L) 10/02/2020     Lab Results   Component Value Date    LDL 9 01/18/2024    LDL 32 10/02/2020     Lab Results   Component Value Date    TRIG 82 01/18/2024    TRIG 135 10/02/2020     Lab Results  "  Component Value Date    HGBA1C 5.70 (H) 01/18/2024     Lab Results   Component Value Date    INR 1.7 (H) 01/17/2024    PROTIME 19.5 (H) 01/17/2024     No results found for: \"TOWFCGHU29\"  No results found for: \"FOLATE\"    Lactate 1.7    Valproic acid level 1/17/24 86.8    Images/Procedures   CT head 1/17/2024  No intracranial abnormality    MRI brain without contrast 1/17/2024  A small focus of FLAIR hyperintensity within the right parietal occipital region of undetermined significance, likely related to chronic small vessel disease.    MRA brain and neck 1/17/2024  No LVO, significant stenosis or vessel abnormalities.    Routine EEG 1/18/2024  Normal study    Assessment / Plan   Active Hospital Problems:  Bipolar disorder  Anxiety, depression  Encephalopathy    Brief Patient Summary:  Olivier Swift is a 43 y.o. male with history of anxiety, bipolar, depression, type 2 diabetes, hypertension, sleep apnea, cardiac valve disease who presented after a fall.  On exam, patient is not oriented to location, including name of hospital and current city or the city that he lives in, month, year even after this was told to him by the nurse shortly before my visit.  There is also sensory loss to light touch, pinprick, cold and the left arm and leg, absent proprioception and vibration to the bilateral great toes.  There is possibly some midline splitting across the face.    Unclear etiology of patient's altered mental status.  He does not have a seizure history and was on Depakote for his bipolar disorder.  Semiology of event does not seem consistent with seizure as well as with ongoing AMS would expect changes on EEG and his EEG was normal.  It is possible patient fell due to dysautonomia resulting in concussion leading to confusion.  MRI brain without contrast showed FLAIR changes in the right parietal lobe that could be chronic small vessel changes versus demyelinating lesion.  Given ongoing symptoms would recommend " repeating MRI with contrast to help discern etiology.     There is also some inconsistencies in his story, midline splitting that gives concern for possible functional overlay.  However this is a diagnosis of exclusion and will rule out other pathologic processes first.    His falls could be due to dysautonomia leading to orthostatic hypotension versus peripheral neuropathy decreasing proprioception.  Would recommend checking orthostatics.  It does not appear he has had an EMG in the past, could consider this as an outpatient procedure.      Plan:   -Continue Depakote 500 mg 3 times daily for bipolar disorder  -MRI brain with contrast  -Check orthostatics    I have discussed the above with the patient, bedside RN, hospitalist  Time spent with patient: 45 minutes in face-to-face evaluation and management of the patient.       Dennis Champion, DO

## 2024-01-19 LAB
GLUCOSE BLDC GLUCOMTR-MCNC: 83 MG/DL (ref 70–130)
GLUCOSE BLDC GLUCOMTR-MCNC: 83 MG/DL (ref 70–130)
GLUCOSE BLDC GLUCOMTR-MCNC: 87 MG/DL (ref 70–130)
GLUCOSE BLDC GLUCOMTR-MCNC: 98 MG/DL (ref 70–130)

## 2024-01-19 PROCEDURE — 82948 REAGENT STRIP/BLOOD GLUCOSE: CPT

## 2024-01-19 PROCEDURE — 92507 TX SP LANG VOICE COMM INDIV: CPT

## 2024-01-19 PROCEDURE — 99239 HOSP IP/OBS DSCHRG MGMT >30: CPT | Performed by: INTERNAL MEDICINE

## 2024-01-19 PROCEDURE — 97110 THERAPEUTIC EXERCISES: CPT

## 2024-01-19 PROCEDURE — G0378 HOSPITAL OBSERVATION PER HR: HCPCS

## 2024-01-19 PROCEDURE — 99214 OFFICE O/P EST MOD 30 MIN: CPT | Performed by: STUDENT IN AN ORGANIZED HEALTH CARE EDUCATION/TRAINING PROGRAM

## 2024-01-19 PROCEDURE — 97116 GAIT TRAINING THERAPY: CPT

## 2024-01-19 RX ORDER — ASPIRIN 81 MG/1
81 TABLET, CHEWABLE ORAL DAILY
Qty: 30 TABLET | Refills: 0 | Status: SHIPPED | OUTPATIENT
Start: 2024-01-20 | End: 2024-01-25 | Stop reason: SDUPTHER

## 2024-01-19 RX ADMIN — SERTRALINE HYDROCHLORIDE 200 MG: 100 TABLET ORAL at 09:06

## 2024-01-19 RX ADMIN — DIVALPROEX SODIUM 500 MG: 250 TABLET, DELAYED RELEASE ORAL at 21:38

## 2024-01-19 RX ADMIN — RANOLAZINE 500 MG: 500 TABLET, EXTENDED RELEASE ORAL at 21:39

## 2024-01-19 RX ADMIN — Medication 10 ML: at 09:07

## 2024-01-19 RX ADMIN — DIVALPROEX SODIUM 500 MG: 250 TABLET, DELAYED RELEASE ORAL at 09:06

## 2024-01-19 RX ADMIN — SENNOSIDES AND DOCUSATE SODIUM 2 TABLET: 8.6; 5 TABLET ORAL at 21:38

## 2024-01-19 RX ADMIN — CLOMIPRAMINE HYDROCHLORIDE 25 MG: 25 CAPSULE ORAL at 21:38

## 2024-01-19 RX ADMIN — DIVALPROEX SODIUM 500 MG: 250 TABLET, DELAYED RELEASE ORAL at 18:58

## 2024-01-19 RX ADMIN — GABAPENTIN 300 MG: 300 CAPSULE ORAL at 09:06

## 2024-01-19 RX ADMIN — RANOLAZINE 500 MG: 500 TABLET, EXTENDED RELEASE ORAL at 09:06

## 2024-01-19 RX ADMIN — GABAPENTIN 300 MG: 300 CAPSULE ORAL at 18:08

## 2024-01-19 RX ADMIN — ASPIRIN 81 MG: 81 TABLET, CHEWABLE ORAL at 09:06

## 2024-01-19 RX ADMIN — ATORVASTATIN CALCIUM 80 MG: 40 TABLET, FILM COATED ORAL at 21:38

## 2024-01-19 RX ADMIN — GABAPENTIN 300 MG: 300 CAPSULE ORAL at 21:38

## 2024-01-19 NOTE — PLAN OF CARE
Goal Outcome Evaluation:  Plan of Care Reviewed With: patient        Progress: no change  Outcome Evaluation: Pt with noteable weakness in BLEs and with knee buckling in prolonged standing requiring assist to sit in bathroom onto BSC. Pt will continue to benefit from PT to address ongoing strength, balance, and endurance deficits, and return to PLOF. Due to pt's fxnl mobility status well below baseline, PT changing rec to IRF upon dc.      Anticipated Discharge Disposition (PT): inpatient rehabilitation facility

## 2024-01-19 NOTE — PLAN OF CARE
Goal Outcome Evaluation:                     Anticipated Discharge Disposition (SLP): home with assist, unknown             Treatment Assessment (SLP): continued, cognitive-linguistic disorder, improved, dysarthria (01/19/24 7563)    Plan for Continued Treatment (SLP): continue treatment per plan of care (01/19/24 9100)

## 2024-01-19 NOTE — CASE MANAGEMENT/SOCIAL WORK
Continued Stay Note  [facility]     Patient Name: Olivier Swift  MRN: [id number]  Today's Date: 1/19/2024    Admit Date: 1/17/2024    Plan: [facility] vs home   Discharge Plan       Row Name 01/19/24 1451       Plan    Plan [facility] vs home    Plan Comments I spoke with patient in regards to discharge planning and rehab recommendations for inpatient rehab. He is fine with a referral to [facility], if he needs this; otherwise, plans for home. If improves enough for home and is discharged, I can order outpatient rehab on Monday. Alexandra with [facility] picked up the referral and will complete it on Monday.     Final Discharge Disposition Code 30 - still a patient                   Discharge Codes    No documentation.                 Expected Discharge Date and Time       Expected Discharge Date Expected Discharge Time    Jan 19, 2024               Moni Bhatt RN

## 2024-01-19 NOTE — THERAPY TREATMENT NOTE
Acute Care - Speech Language Pathology Treatment Note  [facility]     Patient Name: Olivier Swift  : 1980  MRN: [id number]  Today's Date: 2024               Admit Date: 2024     Visit Dx:    ICD-10-CM ICD-9-CM   1. Confusion  R41.0 298.9   2. Closed head injury, initial encounter  S09.90XA 959.01   3. Accident due to mechanical fall without injury, initial encounter  W19.XXXA E888.9   4. Aphasia  R47.01 784.3   5. Cognitive communication deficit  R41.841 799.52   6. Dysarthria  R47.1 784.51   7. Impaired functional mobility, balance, gait, and endurance  Z74.09 V49.89     Patient Active Problem List   Diagnosis    Seizure    Primary hypertension    Type 2 diabetes mellitus with kidney complication, with long-term current use of insulin    Bipolar 1 disorder    Anxiety disorder    Fall     Past Medical History:   Diagnosis Date    Anxiety     Bipolar depression     Depression     Diabetes mellitus     Diabetic amyotrophy associated with type 2 diabetes mellitus     Hypertension     Leaky heart valve     Sleep apnea      History reviewed. No pertinent surgical history.    SLP Recommendation and Plan                 Anticipated Discharge Disposition (SLP): home with assist, unknown (24 1300)        Therapy Frequency (SLP SLC): 3 days per week (24 1300)  Predicted Duration Therapy Intervention (Days): until discharge (24 1300)        Daily Summary of Progress (SLP): progress toward functional goals as expected (24 1135)           Treatment Assessment (SLP): continued, cognitive-linguistic disorder, improved, dysarthria (24 1135)  Treatment Assessment Comments (SLP): Cont w/ cognitive linguistic deficits and dysarthria. Dysarthria improved. SLP will cont to f/u for tx as appropriate (24 1135)  Plan for Continued Treatment (SLP): continue treatment per plan of care (24 113)         SLP EVALUATION (last 72 hours)       SLP SLC Evaluation       Row Name  01/19/24 1300 01/19/24 1135 01/17/24 1505             Communication Assessment/Intervention    Document Type --  - therapy note (daily note)  - evaluation  -      Subjective Information --  - no complaints  - no complaints  -      Patient Observations --  - alert;cooperative  - alert;cooperative  -      Patient/Family/Caregiver Comments/Observations --  -MH No family present  - Family present  -      Patient Effort --  - good  - good  -      Symptoms Noted During/After Treatment --  -MH none  - none  -         General Information    Patient Profile Reviewed -- -- yes  -      Pertinent History Of Current Problem -- -- Adm w/ AMS. Hx: bipolar disorder, diabetes, HTN, anxiety. MRI: nonspecific hyperintensity in R parietal-occipital region  -      Precautions/Limitations, Vision -- -- WFL;for purposes of eval  -      Precautions/Limitations, Hearing -- -- WFL;for purposes of eval  -      Prior Level of Function-Communication -- -- unknown  -      Plans/Goals Discussed with -- -- patient;family;agreed upon  -      Barriers to Rehab -- -- none identified  -      Patient's Goals for Discharge -- -- patient did not state  -      Family Goals for Discharge -- -- family did not state  -         Pain    Additional Documentation --  - Pain Scale: FACES Pre/Post-Treatment (Group)  - Pain Scale: FACES Pre/Post-Treatment (Group)  -         Pain Scale: FACES Pre/Post-Treatment    Pain: FACES Scale, Pretreatment --  -MH 0-->no hurt  -MH 0-->no hurt  -MH      Posttreatment Pain Rating --  -MH 0-->no hurt  -MH 0-->no hurt  -MH         Comprehension Assessment/Intervention    Comprehension Assessment/Intervention -- -- Auditory Comprehension  -         Auditory Comprehension Assessment/Intervention    Auditory Comprehension (Communication) -- -- WFL  -         Expression Assessment/Intervention    Expression Assessment/Intervention -- -- verbal expression  -         Verbal  Expression Assessment/Intervention    Verbal Expression -- -- WF  -         Motor Speech Assessment/Intervention    Motor Speech Function -- -- mild impairment  -      Characteristics Consistent with Dysarthria -- -- decreased intensity;decreased articulation  -         Cognitive Assessment Intervention- SLP    Cognitive Function (Cognition) -- -- moderate impairment  -      Orientation Status (Cognition) -- -- awareness of basic personal information;person;place;situation;WFL;time;mild impairment  -      Memory (Cognitive) -- -- simple;immediate;mild impairment;moderate impairment  -      Attention (Cognitive) -- -- selective;sustained;moderate impairment  -      Thought Organization (Cognitive) -- -- concrete divergent;concrete convergent;moderate impairment  -      Reasoning (Cognitive) -- -- simple;moderate impairment  -      Problem Solving (Cognitive) -- -- simple;temporal;moderate impairment  -      Functional Math (Cognitive) -- -- simple;word problems;moderate impairment  -      Executive Function (Cognition) -- -- deficit awareness;[facility]  -         SLP Evaluation Clinical Impressions    SLP Diagnosis -- -- mild;dysarthria;mild-moderate;cognitive-linguistic disorder  -      SLP Diagnosis Comments -- -- Pt presents w/ mild dysarthria and mild to moderate cognitive linguistic deficits per this eval. Expressive/receptive language appears intact @ simple level. SLP will f/u for tx and dx tx as appropriate  -      Rehab Potential/Prognosis -- -- good  -[facility] Criteria for Skilled Therapy Interventions Met -- -- yes  -      Functional Impact -- -- functional impact in ADLs;difficulty in expressing complex messages  -         SLP Treatment Clinical Impressions    Treatment Assessment (SLP) --  -MH continued;cognitive-linguistic disorder;improved;dysarthria  - --      Treatment Assessment Comments (SLP) --  -MH Cont w/ cognitive linguistic deficits and dysarthria. Dysarthria  improved. SLP will cont to f/u for tx as appropriate  -MH --      Daily Summary of Progress (SLP) --  -MH progress toward functional goals as expected  -MH --      Plan for Continued Treatment (SLP) --  -MH continue treatment per plan of care  -MH --      Care Plan Review --  -MH care plan/treatment goals reviewed  -MH --         Recommendations    Therapy Frequency (SLP SLC) 3 days per week  -MH 3 days per week  -MH 3 days per week  -MH      Predicted Duration Therapy Intervention (Days) until discharge  -MH until discharge  -MH until discharge  -MH      Anticipated Discharge Disposition (SLP) home with assist;unknown  -MH home with assist;unknown  -MH home with assist;unknown  -MH                User Key  (r) = Recorded By, (t) = Taken By, (c) = Cosigned By      Initials Name Effective Dates    Patricia Pino, MS CCC-SLP 05/12/23 -                        EDUCATION  The patient has been educated in the following areas:     Cognitive Impairment Communication Impairment.           SLP GOALS       Row Name 01/19/24 1135 01/17/24 1505          Patient will demonstrate functional speech skills for return to discharge environment    [place] Independently  -MH Independently  -MH     Time frame by discharge  - by discharge  -MH     Progress/Outcomes continuing progress toward goal  -MH new goal  -MH        Patient will demonstrate functional cognitive-linguistic skills for return to discharge environment    [place] Independently  -MH Independently  -MH     Time frame by discharge  - by discharge  -MH     Progress/Outcomes continuing progress toward goal  -MH new goal  -        SLP Diagnostic Treatment     Patient will participate in further assessment in the following areas reading comprehension;graphic expression  -MH reading comprehension;graphic expression  -MH     Time Frame (Diagnostic) short term goal (STG)  -MH short term goal (STG)  -MH     Progress/Outcomes (Additional Goal 1, SLP) goal met   -MH new goal  -MH     Comment (Diagnostic) Dx tx completed, graphic expression and reading comprehension WFL  -MH --        Phonation Goal 1 (SLP)    Improve Phonation By Goal 1 (SLP) using loud speech;80%;with minimal cues (75-90%)  -MH using loud speech;80%;with minimal cues (75-90%)  -MH     Time Frame (Phonation Goal 1, SLP) short term goal (STG)  -MH short term goal (STG)  -MH     Progress (Phonation Goal 1, SLP) 70%;with minimal cues (75-90%)  - --     Progress/Outcomes (Phonation Goal 1, SLP) continuing progress toward goal  -MH new goal  -MH        Articulation Goal 1 (SLP)    Improve Articulation Goal 1 (SLP) by over-articulating in connected speech;80%;with minimal cues (75-90%)  -MH by over-articulating in connected speech;80%;with minimal cues (75-90%)  -MH     Time Frame (Articulation Goal 1, SLP) short term goal (STG)  -MH short term goal (STG)  -MH     Progress (Articulation Goal 1, SLP) 70%;with minimal cues (75-90%)  - --     Progress/Outcomes (Articulation Goal 1, SLP) continuing progress toward goal  -MH new goal  -MH        Attention Goal 1 (SLP)    Improve Attention by Goal 1 (SLP) complete selective attention task;complete sustained attention task;80%;with minimal cues (75-90%)  -MH complete selective attention task;complete sustained attention task;80%;with minimal cues (75-90%)  -     Time Frame (Attention Goal 1, SLP) short term goal (STG)  -MH short term goal (STG)  -MH     Progress (Attention Goal 1, SLP) 50%;with moderate cues (50-74%)  - --     Progress/Outcomes (Attention Goal 1, SLP) continuing progress toward goal  -MH new goal  -        Orientation Goal 1 (SLP)    Improve Orientation Through Goal 1 (SLP) demonstrating orientation to day;demonstrating orientation to month;demonstrating orientation to year;demonstrating orientation to disease/impairment;80%;with minimal cues (75-90%)  - demonstrating orientation to day;demonstrating orientation to month;demonstrating  orientation to year;demonstrating orientation to disease/impairment;80%;with minimal cues (75-90%)  -MH     Time Frame (Orientation Goal 1, SLP) short term goal (STG)  -MH short term goal (STG)  -MH     Progress/Outcomes (Orientation Goal 1, SLP) goal met  -MH new goal  -MH     Comment (Orientation Goal 1, SLP) Oriented to all. Will continue to address goal next session to ensure carry over  -MH --        Memory Skills Goal 1 (SLP)    Improve Memory Skills Through Goal 1 (SLP) recalling related word lists immediately;recalling unrelated word lists immediately;80%;with minimal cues (75-90%)  -MH recalling related word lists immediately;recalling unrelated word lists immediately;80%;with minimal cues (75-90%)  -MH     Time Frame (Memory Skills Goal 1, SLP) short term goal (STG)  -MH short term goal (STG)  -MH     Progress (Memory Skills Goal 1, SLP) 60%;with minimal cues (75-90%)  -MH --     Progress/Outcomes (Memory Skills Goal 1, SLP) continuing progress toward goal  -MH new goal  -MH     Comment (Memory Skills Goal 1, SLP) Recalling related word lists immediately  -MH --        Organizational Skills Goal 1 (SLP)    Improve Thought Organization Through Goal 1 (SLP) completing a divergent naming task;completing a convergent naming task;80%;with minimal cues (75-90%)  -MH completing a divergent naming task;completing a convergent naming task;80%;with minimal cues (75-90%)  -MH     Time Frame (Thought Organization Skills Goal 1, SLP) short term goal (STG)  -MH short term goal (STG)  -MH     Progress (Thought Organization Skills Goal 1, SLP) 60%;with moderate cues (50-74%)  -MH --     Progress/Outcomes (Thought Organization Skills Goal 1, SLP) continuing progress toward goal  -MH new goal  -MH     Comment (Thought Organization Skills Goal 1, SLP) 40% divergent, 60% convergent  -MH --               User Key  (r) = Recorded By, (t) = Taken By, (c) = Cosigned By      Initials Name Provider Type    Patricia Pino, MS  CCC-SLP Speech and Language Pathologist                            Time Calculation:      Time Calculation- SLP       Row Name 01/19/24 1334             Time Calculation- SLP    SLP Start Time 1135  -      SLP Received On 01/19/24  -         Untimed Charges    62760-OI Treatment/ST Modification Prosth Aug Alter  41  -         Total Minutes    Untimed Charges Total Minutes 41  -       Total Minutes 41  -MH                User Key  (r) = Recorded By, (t) = Taken By, (c) = Cosigned By      Initials Name Provider Type     Patricia Leahy, MS CCC-SLP Speech and Language Pathologist                    Therapy Charges for Today       Code Description Service Date Service Provider Modifiers Qty    [id number]  ST TREATMENT SPEECH 3 1/19/2024 Patricia Leahy, MS KUNZ-SLP GN 1                       MS DIPIKA Rowley  1/19/2024

## 2024-01-19 NOTE — PROGRESS NOTES
[facility] Neurology    Progress Note    Patient Name: Olivier Swift  : 1980  MRN: [id number]  Primary Care Physician:  Pastora Carbajal APRN  Date of admission: 2024    Subjective     Reason for consult: AMS and speech disturbance    History of Present Illness   Patient is doing much better this morning, feels that his speech has improved.  He still has some weakness and needed to use a walker with physical therapy this morning.  He uses a cane at baseline.  He states that he does have some leg weakness from bulging disks in his back.    Review of Systems   General: Negative for fever, nausea, or vomiting.   Neurological: Negative for headache, pain, or weakness.     Objective     Vitals:   Temp:  [97.3 °F (36.3 °C)-98.8 °F (37.1 °C)] 97.3 °F (36.3 °C)  Heart Rate:  [59-99] 67  Resp:  [16-20] 18  BP: (115-134)/(75-92) 129/92    Orthostatics:   Laying 122/85 HR 77, Sitting 118/85 HR 77, Standing 117/92 HR 73.     Neurologic Exam   Mental status/Cognition: alert; oriented to person, place, year, and month; good attention, responds appropriately; able to spell world forward and backward  Speech/language: fluent; comprehension intact    Cranial nerves: EOMI. PERRL. Face appears symmetric. No dysarthria.  Face sensation intact to light touch bilaterally.  Temperature is midline    Motor: No pronator drift.  Able to raise both legs off of bed to antigravity, though with some difficulty left > right.  Some weakness noted bilaterally with dorsiflexion.    Sensation: Mildly diminished to light touch in the left leg.    Current Medications    Current Facility-Administered Medications:     acetaminophen (TYLENOL) tablet 650 mg, 650 mg, Oral, Q4H PRN, Destini Montes MD    aspirin chewable tablet 81 mg, 81 mg, Oral, Daily, 81 mg at 24 0906 **OR** aspirin suppository 300 mg, 300 mg, Rectal, Daily, Odell Sow APRN    atorvastatin (LIPITOR) tablet 80 mg, 80 mg, Oral, Nightly, Odell Sow APRN, 80  mg at 01/18/24 2134    sennosides-docusate (PERICOLACE) 8.6-50 MG per tablet 2 tablet, 2 tablet, Oral, BID, 2 tablet at 01/18/24 0852 **AND** polyethylene glycol (MIRALAX) packet 17 g, 17 g, Oral, Daily PRN **AND** bisacodyl (DULCOLAX) EC tablet 5 mg, 5 mg, Oral, Daily PRN **AND** bisacodyl (DULCOLAX) suppository 10 mg, 10 mg, Rectal, Daily PRN, Destini Montes MD    clomiPRAMINE (ANAFRANIL) capsule 25 mg, 25 mg, Oral, Nightly, Destini Montes MD, 25 mg at 01/18/24 2225    cyclobenzaprine (FLEXERIL) tablet 10 mg, 10 mg, Oral, TID PRN, Destini Montes MD    dextrose (D50W) (25 g/50 mL) IV injection 25 g, 25 g, Intravenous, Q15 Min PRN, Destini Montes MD    dextrose (GLUTOSE) oral gel 15 g, 15 g, Oral, Q15 Min PRN, Destnii Montes MD    divalproex (DEPAKOTE) DR tablet 500 mg, 500 mg, Oral, TID, Destini Montes MD, 500 mg at 01/19/24 0906    gabapentin (NEURONTIN) capsule 300 mg, 300 mg, Oral, TID, Destini Montes MD, 300 mg at 01/19/24 0906    glucagon (GLUCAGEN) injection 1 mg, 1 mg, Intramuscular, Q15 Min PRN, Destini Montes MD    Insulin Lispro (humaLOG) injection 2-7 Units, 2-7 Units, Subcutaneous, 4x Daily AC & at Bedtime, Destini Montes MD    ranolazine (RANEXA) 12 hr tablet 500 mg, 500 mg, Oral, Q12H, Destini Montes MD, 500 mg at 01/19/24 0906    sertraline (ZOLOFT) tablet 200 mg, 200 mg, Oral, Daily, Destini Montes MD, 200 mg at 01/19/24 0906    sodium chloride 0.9 % flush 10 mL, 10 mL, Intravenous, PRN, Felipe Wetzel MD    sodium chloride 0.9 % flush 10 mL, 10 mL, Intravenous, Q12H, Odell Sow APRN, 10 mL at 01/19/24 0907    sodium chloride 0.9 % flush 10 mL, 10 mL, Intravenous, PRN, Odell Sow APRN    sodium chloride 0.9 % flush 10 mL, 10 mL, Intravenous, Q12H, Destini Montes MD, 10 mL at 01/18/24 2134    sodium chloride 0.9 % flush 10 mL, 10 mL, Intravenous, PRN, Destini Montes MD    sodium chloride 0.9 % infusion 40 mL, 40 mL, Intravenous, PRNMagi Ryan, APRN    sodium chloride 0.9 % infusion 40 mL, 40  "mL, Intravenous, PRN, Destini Montes MD    Laboratory Results:   Lab Results   Component Value Date    GLUCOSE 83 01/18/2024    CALCIUM 8.6 01/18/2024     01/18/2024    K 4.2 01/18/2024    CO2 30.0 (H) 01/18/2024     01/18/2024    BUN 12 01/18/2024    CREATININE 1.03 01/18/2024    EGFRIFAFRI 96 11/11/2021    BCR 11.7 01/18/2024    ANIONGAP 5.0 01/18/2024     Lab Results   Component Value Date    WBC 7.80 01/18/2024    HGB 13.5 01/18/2024    HCT 40.7 01/18/2024    MCV 89.3 01/18/2024     01/18/2024     Lab Results   Component Value Date    CHOL 54 01/18/2024    CHOL 86 10/02/2020     Lab Results   Component Value Date    HDL 28 (L) 01/18/2024    HDL 27 (L) 10/02/2020     Lab Results   Component Value Date    LDL 9 01/18/2024    LDL 32 10/02/2020     Lab Results   Component Value Date    TRIG 82 01/18/2024    TRIG 135 10/02/2020     Lab Results   Component Value Date    HGBA1C 5.70 (H) 01/18/2024     Lab Results   Component Value Date    INR 1.7 (H) 01/17/2024    PROTIME 19.5 (H) 01/17/2024     No results found for: \"FOLATE\"  No results found for: \"UPMPVVUK46\"    Lactate 1.7     Valproic acid level 1/17/24 86.8    Images/Procedures   CT head 1/17/2024  No intracranial abnormality     MRI brain without contrast 1/17/2024  A small focus of FLAIR hyperintensity within the right parietal occipital region of undetermined significance, likely related to chronic small vessel disease.     MRA brain and neck 1/17/2024  No LVO, significant stenosis or vessel abnormalities.     Routine EEG 1/18/2024  Normal study    MRI brain with contrast 1/18/2024  No pathologic intracranial enhancement.  Previously demonstrated area of abnormal increased T2 signal within the periventricular white matter of the right parietal lobe corresponding to area of chronic skin change.    Assessment / Plan   Active Hospital Problems:  Fall  Suspected neuropathy  Lumbar stenosis  Postconcussive confusion    Brief Patient Summary:  Olivier " KAYCEE Swift is a 43 y.o. male with history of  anxiety, bipolar, depression, type 2 diabetes, hypertension, sleep apnea, cardiac valve disease who presented after a fall.  He is more alert, oriented on exam today compared to yesterday.  He does have some asymmetric leg weakness that he states is his baseline.  He walks with a cane while at home due to bulging disks in his back.  He likely has a neuropathy that is contributing to his falls at home.  Suspect patient fell and possibly sustained a concussion leading to ongoing confusion.     Less likely seizure, patient does not have a history of seizure disorder.  No further workup from a neurology perspective, patient is okay to discharge.    Plan:   - Continue Depakote 500 mg 3 times daily for bipolar disorder   -No follow-up with neurology needed  -Patient is okay to discharge      I have discussed the above with the patient, hospitalist  Time spent with patient: 35 minutes in face-to-face evaluation and management of the patient.      Dennis Champion DO, MS

## 2024-01-19 NOTE — CONSULTS
"Diabetes Education  Assessment/Teaching    Patient Name:  Olivier Swift  YOB: 1980  MRN: [id number]  Admit Date:  1/17/2024      Assessment Date:  1/19/2024  Flowsheet Row Most Recent Value   General Information     Referral From: Other (comment)  [stroke protocol]   Height 165.1 cm (65\")   Height Method Stated   Weight 107 kg (235 lb 14.3 oz)   Weight Method Bed scale   Are you currently involved in an activity/exercise program?  No   Do you have high blood pressure? yes   Are you currently being treated for high blood pressure? yes   Patient expressed need avoiding lows   Is patient pregnant? n/a   Pregnancy Assessment    Diabetes History    What type of diabetes do you have? Type 2   Current DM knowledge fair   Have you had diabetes education/teaching in the past? yes   Do you test your blood sugar at home? yes   Frequency of checks using CGM   Meter type Cat 2   Who performs the test? self   Have you had low blood sugar? (<70mg/dl) yes   How often do you have low blood sugar? frequently   Have you had high blood sugar? (>140mg/dl) yes   How often do you have high blood sugar? rare   How would you rate your diabetes control? good   Do you have any diabetes complications? circulation problems, neuropathy, heart disease   Education Preferences    What areas of diabetes would you like to learn about? avoiding low blood sugar   Barriers to Learning changes in sensation   Nutrition Information    How many meals do you eat each day? 3   How many snacks do you eat each day? 1   Assessment Topics    Healthy Eating - Assessment Needs education   Being Active - Assessment Needs education   Taking Medication - Assessment Needs education   Problem Solving - Assessment Needs education   Reducing Risk - Assessment Needs education   Healthy Coping - Assessment Needs education   Monitoring - Assessment Needs education   DM Goals    Healthy Eating - Goal Tomorrow   Being Active - Goal Tomorrow   Taking " Medication - Goal Tomorrow   Problem Solving - Goal Tomorrow   Reducing Risk - Goal Tomorrow   Healthy Coping - Goal Tomorrow   Monitoring - Goal Tomorrow   Contact Plan Follow-up medical care            Flowsheet Row Most Recent Value   DM Education Needs    Meter Has own   Meter Type Freestyle   Medication Insulin, Oral, Other injectables   Problem Solving Hypoglycemia, Hyperglycemia   Reducing Risks A1C testing, Lipids, Blood pressure   Healthy Eating Reviewed meal plan   Discharge Plan Home   Teaching Method Explanation, Discussion, Handouts   Patient Response Verbalized understanding              Other Comments:  Total time spent reviewing chart, preparing education/materials, providing education at bedside, and coordinating care approx 40 minutes.    Consult for diabetes education received per stroke protocol. Chart reviewed. Pt was seen at bedside today. Permission given for visit.     Discussed and taught Mr. Swift about type 2 diabetes self-management, risk factors, and importance of blood glucose control to reduce complications. Target blood glucose readings and A1c goals per ADA were reviewed. Reviewed with patient current A1c 5.7 and discussed its significance. Signs, symptoms, and treatment of hyperglycemia and hypoglycemia were discussed. Reviewed most common causes of low blood sugar including missing/skipping meals or taking too much insulin.     Pt reports he has frequent lows. Has been wearing a Cat CGM for about a year, and thinks it has helped with preventing some lows, but still occurs approx 4 times per week, typically 11pm-midnight. Goes to sleep around 8 pm. We brainstormed snack ideas and discussed what a balanced snack looks like, including serving of carb and protein. Discussed how carbs and protein affect blood sugar differently. Encouraged to consider logging food using val so that he can observe food intake patterns in correlation to his glucose readings.     Reports he takes  "metformin, levemir, and ozempic. Believes his current levemir dose is 30 u daily, which was decreased about 4-5 months ago from 35 u daily. Goes to PCP every 6 months for labs and check-up.    Reviewed ABCs of diabetes care. Reviewed insulin injection techniques.     Lifestyle changes such as physical activity with MD approval and healthy eating were encouraged. Stressed the importance of strict blood sugar control after surgery to prevent complications such as infection and to promote healing of incision. Encouraged pt to monitor blood sugar at home 3-4  times per day and to call PCP if blood sugar is trending high. Encouraged to keep record of blood glucose readings to take to follow up appointment with PCP. Provided patient with copy of [employer] \"What is Diabetes\" handout, \"Blood Glucose Goals\" handout, and \"What is A1c\" handout.     Thank you for this consult.     Pt is not a candidate for follow up stroke class based on exclusion criteria of MRI negative for acute changes.     Electronically signed by:  Arielle Griffith RN  01/19/24 15:02 EST  "

## 2024-01-19 NOTE — THERAPY TREATMENT NOTE
Patient Name: Olivier Swift  : 1980    MRN: [id number]                              Today's Date: 2024       Admit Date: 2024    Visit Dx:     ICD-10-CM ICD-9-CM   1. Confusion  R41.0 298.9   2. Closed head injury, initial encounter  S09.90XA 959.01   3. Accident due to mechanical fall without injury, initial encounter  W19.XXXA E888.9   4. Aphasia  R47.01 784.3   5. Cognitive communication deficit  R41.841 799.52   6. Dysarthria  R47.1 784.51   7. Impaired functional mobility, balance, gait, and endurance  Z74.09 V49.89     Patient Active Problem List   Diagnosis    Seizure    Primary hypertension    Type 2 diabetes mellitus with kidney complication, with long-term current use of insulin    Bipolar 1 disorder    Anxiety disorder    Fall     Past Medical History:   Diagnosis Date    Anxiety     Bipolar depression     Depression     Diabetes mellitus     Diabetic amyotrophy associated with type 2 diabetes mellitus     Hypertension     Leaky heart valve     Sleep apnea      History reviewed. No pertinent surgical history.   General Information       Row Name 24 1111          Physical Therapy Time and Intention    Document Type therapy note (daily note)  -KR     Mode of Treatment physical therapy;individual therapy  -KR       Row Name 24 1111          General Information    Patient Profile Reviewed yes  -KR     Existing Precautions/Restrictions fall;seizures  -KR     Barriers to Rehab medically complex  -KR       Row Name 24 1111          Safety Issues, Functional Mobility    Safety Issues Affecting Function (Mobility) safety precautions follow-through/compliance;safety precaution awareness;insight into deficits/self-awareness;awareness of need for assistance  -KR     Impairments Affecting Function (Mobility) balance;strength;sensation/sensory awareness  -KR               User Key  (r) = Recorded By, (t) = Taken By, (c) = Cosigned By      Initials Name Provider Type    MINERVA Askew  MINA Rizo Physical Therapist                   Mobility       Row Name 01/19/24 1111          Bed Mobility    Bed Mobility supine-sit  -KR     Supine-Sit [place] (Bed Mobility) standby assist  -KR     Assistive Device (Bed Mobility) bed rails;head of bed elevated  -KR       Row Name 01/19/24 1111          Bed-Chair Transfer    Bed-Chair [place] (Transfers) moderate assist (50% patient effort);1 person assist  -KR     Comment, (Bed-Chair Transfer) steps from BSC to chair.  -KR       Row Name 01/19/24 1111          Sit-Stand Transfer    Sit-Stand [place] (Transfers) contact guard  -KR     Assistive Device (Sit-Stand Transfers) walker, front-wheeled  -KR     Comment, (Sit-Stand Transfer) 1x from bed, 2x from chair, 1x from BSC. Cues for UE push up to stand and reaching back to sit for controlled lowering. Pt with knee buckling while standing at the sink washing his hands requiring modA to maintain balance. PT assisted pt into sitting on the BSC.  -KR       Row Name 01/19/24 1111          Gait/Stairs (Locomotion)    [place] Level (Gait) contact guard;1 person assist;verbal cues  -KR     Assistive Device (Gait) walker, front-wheeled  -KR     Distance in Feet (Gait) 60 + 20 + 5  -KR     Deviations/Abnormal Patterns (Gait) bilateral deviations;owen decreased;gait speed decreased;stride length decreased  -KR     Bilateral Gait Deviations heel strike decreased;forward flexed posture  -KR     Comment, (Gait/Stairs) pt with magnetic-type gait. Cues for increased step length, upright posture, relaxed shoulders.  -KR               User Key  (r) = Recorded By, (t) = Taken By, (c) = Cosigned By      Initials Name Provider Type    KR Sallie Askew, PT Physical Therapist                   Obj/Interventions       Row Name 01/19/24 1118          Motor Skills    Therapeutic Exercise hip;ankle  -KR       Row Name 01/19/24 1118          Hip (Therapeutic Exercise)    Hip (Therapeutic Exercise) strengthening  exercise  -KR     Hip Strengthening (Therapeutic Exercise) 10 repetitions;bilateral;marching while standing  -KR       Row Name 01/19/24 1118          Ankle (Therapeutic Exercise)    Ankle (Therapeutic Exercise) strengthening exercise  -KR     Ankle Strengthening (Therapeutic Exercise) 10 repetitions;bilateral;standing;plantarflexion  -KR       Row Name 01/19/24 1118          Balance    Balance Assessment sitting static balance;sitting dynamic balance;standing static balance;standing dynamic balance  -KR     Static Sitting Balance standby assist  -KR     Dynamic Sitting Balance standby assist  -KR     Position, Sitting Balance unsupported;sitting in chair;sitting edge of bed  -KR     Static Standing Balance contact guard  -KR     Dynamic Standing Balance contact guard  -KR     Position/Device Used, Standing Balance supported;walker, front-wheeled  -KR     Balance Interventions sitting;standing;sit to stand;supported;static;dynamic  -KR     Comment, Balance pt donned pants and shoes while seated EOB with SBA, as well as managed LB dressing in standing with CGA. Pt stood at toilet with CGA.  -KR               User Key  (r) = Recorded By, (t) = Taken By, (c) = Cosigned By      Initials Name Provider Type    Sallie Wood, PT Physical Therapist                   Goals/Plan    No documentation.                  Clinical Impression       Row Name 01/19/24 1119          Pain    Pretreatment Pain Rating 0/10 - no pain  -KR     Posttreatment Pain Rating 0/10 - no pain  -KR       Row Name 01/19/24 1119          Plan of Care Review    Plan of Care Reviewed With patient  -KR     Progress no change  -KR     Outcome Evaluation Pt with noteable weakness in BLEs and with knee buckling in prolonged standing requiring assist to sit in bathroom onto BSC. Pt will continue to benefit from PT to address ongoing strength, balance, and endurance deficits, and return to PLOF. Due to pt's fxnl mobility status well below baseline, PT  changing rec to IRF upon dc.  -KR       Row Name 01/19/24 1119          Vital Signs    Pre Patient Position Supine  -KR     Intra Patient Position Standing  -KR     Post Patient Position Sitting  -KR       Row Name 01/19/24 1119          Positioning and Restraints    Pre-Treatment Position in bed  -KR     Post Treatment Position chair  -KR     In Chair notified nsg;reclined;call light within reach;encouraged to call for assist;exit alarm on;waffle cushion;legs elevated  -KR               User Key  (r) = Recorded By, (t) = Taken By, (c) = Cosigned By      Initials Name Provider Type    Sallie Wood, PT Physical Therapist                   Outcome Measures       Row Name 01/19/24 1123 01/19/24 0800       How much help from another person do you currently need...    Turning from your back to your side while in flat bed without using bedrails? 4  -KR 4  -ME    Moving from lying on back to sitting on the side of a flat bed without bedrails? 3  -KR 4  -ME    Moving to and from a bed to a chair (including a wheelchair)? 3  -KR 3  -ME    Standing up from a chair using your arms (e.g., wheelchair, bedside chair)? 3  -KR 3  -ME    Climbing 3-5 steps with a railing? 2  -KR 3  -ME    To walk in hospital room? 3  -KR 3  -ME    AM-PAC 6 Clicks Score (PT) 18  -KR 20  -ME    Highest Level of Mobility Goal 6 --> Walk 10 steps or more  -KR 6 --> Walk 10 steps or more  -ME              User Key  (r) = Recorded By, (t) = Taken By, (c) = Cosigned By      Initials Name Provider Type    Patricia Mena, RN Registered Nurse    Sallie Wood, PT Physical Therapist                                 Physical Therapy Education       Title: PT OT SLP Therapies (Done)       Topic: Physical Therapy (Done)       Point: Mobility training (Done)       Learning Progress Summary             Patient Acceptance, E, VU by MINERVA at 1/19/2024 1123                         Point: Body mechanics (Done)       Learning Progress Summary              Patient Acceptance, E, VU by KR at 1/19/2024 1123                         Point: Precautions (Done)       Learning Progress Summary             Patient Acceptance, E, VU by KR at 1/19/2024 1123                                         User Key       Initials Effective Dates Name Provider Type Discipline     12/30/22 -  Sallie Askew PT Physical Therapist PT                  PT Recommendation and Plan     Plan of Care Reviewed With: patient  Progress: no change  Outcome Evaluation: Pt with noteable weakness in BLEs and with knee buckling in prolonged standing requiring assist to sit in bathroom onto BSC. Pt will continue to benefit from PT to address ongoing strength, balance, and endurance deficits, and return to PLOF. Due to pt's fxnl mobility status well below baseline, PT changing rec to IRF upon dc.     Time Calculation:         PT Charges       Row Name 01/19/24 1124             Time Calculation    Start Time 1032  -KR      PT Received On 01/19/24  -KR         Timed Charges    87348 - PT Therapeutic Exercise Minutes 10  -KR      02901 - Gait Training Minutes  10  -KR      24628 - PT Therapeutic Activity Minutes 9  -KR         Total Minutes    Timed Charges Total Minutes 29  -KR       Total Minutes 29  -KR                User Key  (r) = Recorded By, (t) = Taken By, (c) = Cosigned By      Initials Name Provider Type    KR Sallie Askew PT Physical Therapist                  Therapy Charges for Today       Code Description Service Date Service Provider Modifiers Qty    81554503890 HC PT THER PROC EA 15 MIN 1/19/2024 Sallie Askew, PT GP 1    47149069398 HC GAIT TRAINING EA 15 MIN 1/19/2024 Sallie Askew, PT GP 1            PT G-Codes  Outcome Measure Options: Modified Sudha, AM-PAC 6 Clicks Daily Activity (OT)  AM-PAC 6 Clicks Score (PT): 18  AM-PAC 6 Clicks Score (OT): 20  Modified Sudha Scale: 2 - Slight disability.  Unable to carry out all previous activities but able to look after own affairs  without assistance.  PT Discharge Summary  Anticipated Discharge Disposition (PT): inpatient rehabilitation facility    Sallie Askew, PT  1/19/2024

## 2024-01-19 NOTE — DISCHARGE SUMMARY
[facility] Medicine Services  DISCHARGE SUMMARY    Patient Name: Olivier Swift  : 1980  MRN: [id number]    Date of Admission: 2024  6:39 AM  Date of Discharge:  24  Primary Care Physician: Pastora Carbajal APRN    Consults       Date and Time Order Name Status Description    2024  9:04 AM Inpatient Neurology Consult General Completed     2024  6:46 AM Inpatient Neurology Consult Stroke Completed             Hospital Course     Presenting Problem: syncope    Active Hospital Problems    Diagnosis  POA    Primary hypertension [I10]  Yes    Type 2 diabetes mellitus with kidney complication, with long-term current use of insulin [E11.29, Z79.4]  Not Applicable    Bipolar 1 disorder [F31.9]  Yes    Anxiety disorder [F41.9]  Yes    Fall [W19.XXXA]  Yes      Resolved Hospital Problems   No resolved problems to display.          Hospital Course:  Olivier Swift is a 42 y/o M with a history of hypertension, diabetes, bipolar disorder, and EULOGIO who is presenting with acute altered mental status following a fall. CT head normal. MRI brain with 10x5 mm focus of FLAIR hyperintensity within the right parietal occipital region. MRI brain with contrast with no pathologic enhancement, area of abnormal signal on MRI brain correspond to area of chronic ischemic changes.      Altered mental status  Slurred speech  Fall  -Etiologies include seizure versus TIA versus complex migraine; orthostatic hypotension/ fall related to dysautonomia resulting in concussion/confusion  - imaging per above  - EEG normal  - Orthostatics negative  - Neurology consulted -- appreciate recs   - Recommend continuing depakote 500 mg TID   - Follow up outpatient neurology as needed      Diabetes  - A1C 5.7  --resume home medications      HTN  --BP well controlled in the hospital on no medication. Possibly orthostatic contributing to the above. Held BP medications on DC with instructions to monitor BP at  home and report readings to PCP     Elevated creatinine on CKD  - hold home diuretic/ACE on admission  --avoid nephrotoxins  - improved      Anxiety  Bipolar disorder  --cont home meds    Discharge Follow Up Recommendations for outpatient labs/diagnostics:   PCP Pastora Carbajal 1 week    Day of Discharge     HPI:   No acute events. States he feels improved today. Speech pace is improved. Reviewed neurology pending recs. Reviewed plans for DC today and he is agreeable. Answered all questions to the best of my ability.     Review of Systems  Gen- No fevers, chills  CV- No chest pain, palpitations  Resp- No cough, dyspnea  GI- No N/V/D, abd pain    Vital Signs:   Temp:  [97.3 °F (36.3 °C)-98.8 °F (37.1 °C)] 98.3 °F (36.8 °C)  Heart Rate:  [59-84] 69  Resp:  [16-18] 18  BP: (115-129)/(72-92) 116/72      Physical Exam:  Constitutional: No acute distress, awake, alert  HENT: NCAT, mucous membranes moist  Respiratory: Clear to auscultation bilaterally, respiratory effort normal   Cardiovascular: RRR, no murmurs, rubs, or gallops  Gastrointestinal: Positive bowel sounds, soft, nontender, nondistended  Musculoskeletal: No bilateral ankle edema  Psychiatric: Appropriate affect, cooperative  Neurologic: Oriented x 3, Cranial Nerves grossly intact to confrontation, speech clear  Skin: No rashes      Pertinent  and/or Most Recent Results     LAB RESULTS:      Lab 01/18/24  0633 01/17/24  0657 01/17/24  0656 01/17/24  0654 01/17/24  0653   WBC 7.80  --  9.83  --   --    HEMOGLOBIN 13.5  --  14.4  --   --    HEMOGLOBIN, POC  --   --   --  14.3  --    HEMATOCRIT 40.7  --  43.7  --   --    HEMATOCRIT POC  --   --   --  42  --    PLATELETS 161  --  162  --   --    NEUTROS ABS 3.53  --  4.49  --   --    IMMATURE GRANS (ABS) 0.01  --  0.03  --   --    LYMPHS ABS 3.46*  --  4.23*  --   --    MONOS ABS 0.69  --  0.99*  --   --    EOS ABS 0.07  --  0.05  --   --    MCV 89.3  --  89.4  --   --    LACTATE  --  1.7  --   --   --    PROTIME  --    --   --   --  19.5*   APTT  --   --  33.7  --   --          Lab 01/18/24  0633 01/17/24  0654   SODIUM 142  --    POTASSIUM 4.2  --    CHLORIDE 107  --    CO2 30.0*  --    ANION GAP 5.0  --    BUN 12  --    CREATININE 1.03 1.50*   EGFR 93.0 59.2*   GLUCOSE 83  --    CALCIUM 8.6  --    HEMOGLOBIN A1C 5.70*  --          Lab 01/17/24  0657   ALT (SGPT) 10   AST (SGOT) 14         Lab 01/17/24  0657 01/17/24  0653   HSTROP T 17  --    PROTIME  --  19.5*   INR  --  1.7*         Lab 01/18/24  0633   CHOLESTEROL 54   LDL CHOL 9   HDL CHOL 28*   TRIGLYCERIDES 82             Brief Urine Lab Results  (Last result in the past 365 days)        Color   Clarity   Blood   Leuk Est   Nitrite   Protein   CREAT   Urine HCG        09/13/23 2046 Yellow   Clear   Negative   Negative   Negative   Negative                 Microbiology Results (last 10 days)       ** No results found for the last 240 hours. **            MRI Brain With Contrast    Result Date: 1/18/2024  MRI BRAIN W CONTRAST Date of Exam: 1/18/2024 8:55 PM EST Indication: left sided tingling; follow up FLAIR changes in right parietal lobe seen on noncontrast MRI.  Comparison: Noncontrast MRI brain 1/17/2024 Technique:  Routine multiplanar/multisequence sequence images of the brain were obtained after the uneventful administration of 20 cc Multihance.  Findings: Within the periventricular white matter of the right parietal lobe there is a 10 x 5 mm area of decreased T1 signal. This corresponds to the area of abnormal increased FLAIR signal on prior exam. There is no associated pathologic contrast enhancement and  this would be most consistent with an area of chronic small vessel ischemic disease. There is no pathologic contrast enhancement within the brain or cerebellum. Globes and orbits are within normal limits. Visualized paranasal sinuses appear clear. Dural venous sinuses are patent. Sella and suprasellar cistern appear within normal limits. Craniovertebral junction is  within normal limits.     Impression: 1. No pathologic intracranial contrast enhancement. 2. Previously demonstrated area of abnormal increased T2 signal within the periventricular white matter of the right parietal lobe corresponds to an area of chronic ischemic change. Electronically Signed: Gregorio Long MD  1/18/2024 9:34 PM EST  Workstation ID: [id number]    Adult Transthoracic Echo Complete W/ Cont if Necessary Per Protocol (With Agitated Saline)    Result Date: 1/17/2024    Left ventricular ejection fraction appears to be 56 - 60%.   Saline test results are negative.   Estimated right ventricular systolic pressure from tricuspid regurgitation is normal (<35 mmHg). Calculated right ventricular systolic pressure from tricuspid regurgitation is 14 mmHg.     MRI Brain Without Contrast    Addendum Date: 1/17/2024    ADDENDUM #1 The following should be included in the FINDINGS section: The common carotid, internal carotid, middle cerebral, anterior cerebral, posterior cerebral, basilar, and vertebral arteries demonstrate normal flow signal without abrupt cut off or significant stenosis. No intracranial aneurysm is seen. Electronically Signed: Felix Nelson MD  1/17/2024 6:17 PM EST  Workstation ID: [id number] ORIGINAL REPORT: MRI BRAIN WO CONTRAST, MRI ANGIOGRAM HEAD WO CONTRAST, MRI ANGIOGRAM NECK WO CONTRAST Date of Exam: 1/17/2024 10:49 AM EST Indication: Neuro deficit, acute, stroke suspected.  Comparison: None available. Technique:  Routine multiplanar/multisequence sequence images of the brain were obtained without contrast administration.  Routine 3-D time-of-flight gradient echo imaging was obtained of the head without contrast administration. Routine 3-D time-of-flight gradient echo imaging was obtained of the neck without contrast administration. FINDINGS: 10 x 5 mm focus of FLAIR signal hyperintensity within the right parietal occipital region (series 9, image 18). Midline structures appear unremarkable.  No significant mass effect, intracranial hemorrhage, or hydrocephalus is identified. Diffusion-weighted sequences demonstrate no acute infarct.The visualized intracranial flow-voids appear unremarkable. The paranasal sinuses and mastoid air cells show no fluid signal. The orbits, globes, retrobulbar soft tissues appear unremarkable. The visualized superficial soft tissues and cervical spine demonstrate no significant abnormality. IMPRESSION: 1.  10 x 5 mm focus of FLAIR signal hyperintensity within the right parietal occipital region (series 9, image 18). This finding is nonspecific and may be related to chronic microvascular ischemic change, postinfectious/postinflammatory states, demyelinating conditions, as well as other etiologies. If this location corresponds to patient's acute symptomatology, consider further evaluation with contrast-enhanced MRI of the brain. 2.No acute abnormality identified within the large arteries of the head or neck. 3.No significant stenosis of the bilateral internal carotid arteries. Electronically Signed: Felix Nelson MD  1/17/2024 12:12 PM EST  Workstation ID: [id number]    Result Date: 1/17/2024  MRI BRAIN WO CONTRAST, MRI ANGIOGRAM HEAD WO CONTRAST, MRI ANGIOGRAM NECK WO CONTRAST Date of Exam: 1/17/2024 10:49 AM EST Indication: Neuro deficit, acute, stroke suspected.  Comparison: None available. Technique:  Routine multiplanar/multisequence sequence images of the brain were obtained without contrast administration.  Routine 3-D time-of-flight gradient echo imaging was obtained of the head without contrast administration. Routine 3-D time-of-flight gradient echo imaging was obtained of the neck without contrast administration. FINDINGS: 10 x 5 mm focus of FLAIR signal hyperintensity within the right parietal occipital region (series 9, image 18). Midline structures appear unremarkable. No significant mass effect, intracranial hemorrhage, or hydrocephalus is identified.  Diffusion-weighted sequences demonstrate no acute infarct.The visualized intracranial flow-voids appear unremarkable. The paranasal sinuses and mastoid air cells show no fluid signal. The orbits, globes, retrobulbar soft tissues appear unremarkable. The visualized superficial soft tissues and cervical spine demonstrate no significant abnormality.     1.  10 x 5 mm focus of FLAIR signal hyperintensity within the right parietal occipital region (series 9, image 18). This finding is nonspecific and may be related to chronic microvascular ischemic change, postinfectious/postinflammatory states, demyelinating conditions, as well as other etiologies. If this location corresponds to patient's acute symptomatology, consider further evaluation with contrast-enhanced MRI of the brain. 2.No acute abnormality identified within the large arteries of the head or neck. 3.No significant stenosis of the bilateral internal carotid arteries. Electronically Signed: Felix Nelson MD  1/17/2024 12:12 PM EST  Workstation ID: [id number]    MRI Angiogram Head Without Contrast    Addendum Date: 1/17/2024    ADDENDUM #1 The following should be included in the FINDINGS section: The common carotid, internal carotid, middle cerebral, anterior cerebral, posterior cerebral, basilar, and vertebral arteries demonstrate normal flow signal without abrupt cut off or significant stenosis. No intracranial aneurysm is seen. Electronically Signed: Felix Nelson MD  1/17/2024 6:17 PM EST  Workstation ID: [id number] ORIGINAL REPORT: MRI BRAIN WO CONTRAST, MRI ANGIOGRAM HEAD WO CONTRAST, MRI ANGIOGRAM NECK WO CONTRAST Date of Exam: 1/17/2024 10:49 AM EST Indication: Neuro deficit, acute, stroke suspected.  Comparison: None available. Technique:  Routine multiplanar/multisequence sequence images of the brain were obtained without contrast administration.  Routine 3-D time-of-flight gradient echo imaging was obtained of the head without contrast administration.  Routine 3-D time-of-flight gradient echo imaging was obtained of the neck without contrast administration. FINDINGS: 10 x 5 mm focus of FLAIR signal hyperintensity within the right parietal occipital region (series 9, image 18). Midline structures appear unremarkable. No significant mass effect, intracranial hemorrhage, or hydrocephalus is identified. Diffusion-weighted sequences demonstrate no acute infarct.The visualized intracranial flow-voids appear unremarkable. The paranasal sinuses and mastoid air cells show no fluid signal. The orbits, globes, retrobulbar soft tissues appear unremarkable. The visualized superficial soft tissues and cervical spine demonstrate no significant abnormality. IMPRESSION: 1.  10 x 5 mm focus of FLAIR signal hyperintensity within the right parietal occipital region (series 9, image 18). This finding is nonspecific and may be related to chronic microvascular ischemic change, postinfectious/postinflammatory states, demyelinating conditions, as well as other etiologies. If this location corresponds to patient's acute symptomatology, consider further evaluation with contrast-enhanced MRI of the brain. 2.No acute abnormality identified within the large arteries of the head or neck. 3.No significant stenosis of the bilateral internal carotid arteries. Electronically Signed: Felix Nelson MD  1/17/2024 12:12 PM EST  Workstation ID: [id number]    Result Date: 1/17/2024  MRI BRAIN WO CONTRAST, MRI ANGIOGRAM HEAD WO CONTRAST, MRI ANGIOGRAM NECK WO CONTRAST Date of Exam: 1/17/2024 10:49 AM EST Indication: Neuro deficit, acute, stroke suspected.  Comparison: None available. Technique:  Routine multiplanar/multisequence sequence images of the brain were obtained without contrast administration.  Routine 3-D time-of-flight gradient echo imaging was obtained of the head without contrast administration. Routine 3-D time-of-flight gradient echo imaging was obtained of the neck without contrast  administration. FINDINGS: 10 x 5 mm focus of FLAIR signal hyperintensity within the right parietal occipital region (series 9, image 18). Midline structures appear unremarkable. No significant mass effect, intracranial hemorrhage, or hydrocephalus is identified. Diffusion-weighted sequences demonstrate no acute infarct.The visualized intracranial flow-voids appear unremarkable. The paranasal sinuses and mastoid air cells show no fluid signal. The orbits, globes, retrobulbar soft tissues appear unremarkable. The visualized superficial soft tissues and cervical spine demonstrate no significant abnormality.     1.  10 x 5 mm focus of FLAIR signal hyperintensity within the right parietal occipital region (series 9, image 18). This finding is nonspecific and may be related to chronic microvascular ischemic change, postinfectious/postinflammatory states, demyelinating conditions, as well as other etiologies. If this location corresponds to patient's acute symptomatology, consider further evaluation with contrast-enhanced MRI of the brain. 2.No acute abnormality identified within the large arteries of the head or neck. 3.No significant stenosis of the bilateral internal carotid arteries. Electronically Signed: Felix Nelson MD  1/17/2024 12:12 PM EST  Workstation ID: [id number]    MRI Angiogram Neck Without Contrast    Addendum Date: 1/17/2024    ADDENDUM #1 The following should be included in the FINDINGS section: The common carotid, internal carotid, middle cerebral, anterior cerebral, posterior cerebral, basilar, and vertebral arteries demonstrate normal flow signal without abrupt cut off or significant stenosis. No intracranial aneurysm is seen. Electronically Signed: Felix Nelson MD  1/17/2024 6:17 PM EST  Workstation ID: [id number] ORIGINAL REPORT: MRI BRAIN WO CONTRAST, MRI ANGIOGRAM HEAD WO CONTRAST, MRI ANGIOGRAM NECK WO CONTRAST Date of Exam: 1/17/2024 10:49 AM EST Indication: Neuro deficit, acute, stroke  suspected.  Comparison: None available. Technique:  Routine multiplanar/multisequence sequence images of the brain were obtained without contrast administration.  Routine 3-D time-of-flight gradient echo imaging was obtained of the head without contrast administration. Routine 3-D time-of-flight gradient echo imaging was obtained of the neck without contrast administration. FINDINGS: 10 x 5 mm focus of FLAIR signal hyperintensity within the right parietal occipital region (series 9, image 18). Midline structures appear unremarkable. No significant mass effect, intracranial hemorrhage, or hydrocephalus is identified. Diffusion-weighted sequences demonstrate no acute infarct.The visualized intracranial flow-voids appear unremarkable. The paranasal sinuses and mastoid air cells show no fluid signal. The orbits, globes, retrobulbar soft tissues appear unremarkable. The visualized superficial soft tissues and cervical spine demonstrate no significant abnormality. IMPRESSION: 1.  10 x 5 mm focus of FLAIR signal hyperintensity within the right parietal occipital region (series 9, image 18). This finding is nonspecific and may be related to chronic microvascular ischemic change, postinfectious/postinflammatory states, demyelinating conditions, as well as other etiologies. If this location corresponds to patient's acute symptomatology, consider further evaluation with contrast-enhanced MRI of the brain. 2.No acute abnormality identified within the large arteries of the head or neck. 3.No significant stenosis of the bilateral internal carotid arteries. Electronically Signed: Felix Nelson MD  1/17/2024 12:12 PM EST  Workstation ID: [id number]    Result Date: 1/17/2024  MRI BRAIN WO CONTRAST, MRI ANGIOGRAM HEAD WO CONTRAST, MRI ANGIOGRAM NECK WO CONTRAST Date of Exam: 1/17/2024 10:49 AM EST Indication: Neuro deficit, acute, stroke suspected.  Comparison: None available. Technique:  Routine multiplanar/multisequence sequence  images of the brain were obtained without contrast administration.  Routine 3-D time-of-flight gradient echo imaging was obtained of the head without contrast administration. Routine 3-D time-of-flight gradient echo imaging was obtained of the neck without contrast administration. FINDINGS: 10 x 5 mm focus of FLAIR signal hyperintensity within the right parietal occipital region (series 9, image 18). Midline structures appear unremarkable. No significant mass effect, intracranial hemorrhage, or hydrocephalus is identified. Diffusion-weighted sequences demonstrate no acute infarct.The visualized intracranial flow-voids appear unremarkable. The paranasal sinuses and mastoid air cells show no fluid signal. The orbits, globes, retrobulbar soft tissues appear unremarkable. The visualized superficial soft tissues and cervical spine demonstrate no significant abnormality.     1.  10 x 5 mm focus of FLAIR signal hyperintensity within the right parietal occipital region (series 9, image 18). This finding is nonspecific and may be related to chronic microvascular ischemic change, postinfectious/postinflammatory states, demyelinating conditions, as well as other etiologies. If this location corresponds to patient's acute symptomatology, consider further evaluation with contrast-enhanced MRI of the brain. 2.No acute abnormality identified within the large arteries of the head or neck. 3.No significant stenosis of the bilateral internal carotid arteries. Electronically Signed: Felix Nelson MD  1/17/2024 12:12 PM EST  Workstation ID: [id number]    EEG    Result Date: 1/17/2024  Reason for referral: 43 y.o.male with altered mental status, speech changes, consideration of seizure Technical Summary:  A 19 channel digital EEG was performed using the international 10-20 placement system, including eye leads and EKG leads. Duration: 21 minutes Findings: The patient is awake.  The background shows diffuse medium amplitude intermixed  theta and alpha activity present symmetrically over both hemispheres.  A well-regulated 9 Hz posterior rhythm is evident symmetrically over the occipital and posterior parietal leads.  Low amplitude EMG artifact is noted anteriorly.  Drowsiness and light sleep are seen with mild slowing of the background.  No focal features or epileptiform activity are seen.  Photic stimulation does not change the background.  Hyperventilation is not performed. Video: Available Technical quality: Superior EKG: Regular, 70 bpm SUMMARY: Normal EEG in the awake and lightly asleep states No focal features or epileptiform activity are seen     Normal study This report is transcribed using the Dragon dictation system.      XR Chest 1 View    Result Date: 1/17/2024  XR CHEST 1 VW Date of Exam: 1/17/2024 7:11 AM EST Indication: Acute Stroke Protocol (onset < 12 hrs) Comparison: Chest radiograph 7/1/2022. Findings: Cardiomediastinal silhouette is within normal limits. No focal consolidation or overt pulmonary edema. No pleural effusion or pneumothorax. Osseous structures are unremarkable.     Impression: No evidence of acute cardiopulmonary disease. Electronically Signed: Jordan Castro MD  1/17/2024 7:23 AM EST  Workstation ID: [id number]    CT Head Without Contrast Stroke Protocol    Result Date: 1/17/2024  CT HEAD WO CONTRAST STROKE PROTOCOL Date of Exam: 1/17/2024 6:46 AM EST Indication: Neuro deficit, acute, stroke suspected Neuro deficit, acute stroke suspected. Comparison: CT scan of the head dated July 1, 2022 Technique: Axial CT images were obtained of the head without contrast administration.  Reconstructed coronal images were also obtained. Automated exposure control and iterative construction methods were used. Scan Time: 6:47 a.m. Results discussed with stroke  [place] at 6:52 a.m. Findings: There is no evidence of hemorrhage. There is no mass effect or midline shift. There is no extracerebral collection. Ventricles  are normal in size and configuration for patient's stated age.  Posterior fossa is within normal limits. Calvarium and skull base appear intact.   Visualized sinuses show no air fluid levels. Visualized orbits are unremarkable.     Impression: Normal Electronically Signed: Ar Fitch MD  1/17/2024 6:53 AM EST  Workstation ID: [id number]             Results for orders placed during the hospital encounter of 01/17/24    Adult Transthoracic Echo Complete W/ Cont if Necessary Per Protocol (With Agitated Saline)    Interpretation Summary    Left ventricular ejection fraction appears to be 56 - 60%.    Saline test results are negative.    Estimated right ventricular systolic pressure from tricuspid regurgitation is normal (<35 mmHg). Calculated right ventricular systolic pressure from tricuspid regurgitation is 14 mmHg.      Plan for Follow-up of Pending Labs/Results:     Discharge Details        Discharge Medications        New Medications        Instructions Start Date   aspirin 81 MG chewable tablet   81 mg, Oral, Daily   Start Date: January 20, 2024            Changes to Medications        Instructions Start Date   Ozempic (0.25 or 0.5 MG/DOSE) 2 MG/3ML solution pen-injector  Generic drug: Semaglutide(0.25 or 0.5MG/DOS)  What changed: Another medication with the same name was removed. Continue taking this medication, and follow the directions you see here.   INJECT 0.5 MG SUBCUTANEOUSLY ONCE WEEKLY             Continue These Medications        Instructions Start Date   acetaminophen 500 MG tablet  Commonly known as: TYLENOL   500 mg, Oral, Every 6 Hours PRN      atorvastatin 80 MG tablet  Commonly known as: LIPITOR   80 mg, Oral, Nightly      clomiPRAMINE 25 MG capsule  Commonly known as: ANAFRANIL   25 mg, Oral, Nightly      cyclobenzaprine 10 MG tablet  Commonly known as: FLEXERIL   10 mg, Oral, 3 Times Daily PRN      divalproex 500 MG DR tablet  Commonly known as: DEPAKOTE   500 mg, Oral, 3 Times Daily     "  gabapentin 300 MG capsule  Commonly known as: NEURONTIN   300 mg, Oral, 3 Times Daily      Levemir FlexTouch 100 UNIT/ML injection  Generic drug: insulin detemir   15 Units, Subcutaneous, Daily      meclizine 25 MG tablet  Commonly known as: ANTIVERT   25 mg, Oral, 3 Times Daily PRN      metFORMIN 500 MG tablet  Commonly known as: GLUCOPHAGE   500 mg, Oral, 2 Times Daily With Meals      naproxen 500 MG tablet  Commonly known as: NAPROSYN   500 mg, Oral, 2 Times Daily With Meals      prazosin 2 MG capsule  Commonly known as: MINIPRESS   2 mg, Oral, Nightly      ranolazine 500 MG 12 hr tablet  Commonly known as: RANEXA   500 mg, Oral, 2 Times Daily      sertraline 100 MG tablet  Commonly known as: ZOLOFT   200 mg, Oral, Daily             Stop These Medications      amLODIPine 5 MG tablet  Commonly known as: NORVASC     hydroCHLOROthiazide 25 MG tablet  Commonly known as: HYDRODIURIL     lidocaine 5 %  Commonly known as: LIDODERM     lisinopril 40 MG tablet  Commonly known as: PRINIVIL,ZESTRIL     metoprolol succinate XL 25 MG 24 hr tablet  Commonly known as: TOPROL-XL     PROPRANOLOL HCL PO     QUEtiapine 100 MG tablet  Commonly known as: SEROquel              Allergies   Allergen Reactions    Trazodone Other (See Comments)     \" CAUSED ME TO HAVE AN ERECTION\"          Discharge Disposition:  Home or Self Care    Diet:  Hospital:  Diet Order   Procedures    Diet: Cardiac Diets; Healthy Heart (2-3 Na+); Texture: Regular Texture (IDDSI 7); Fluid Consistency: Thin (IDDSI 0)            Activity:  Activity Instructions       Activity as Tolerated              Restrictions or Other Recommendations:         CODE STATUS:    Code Status and Medical Interventions:   Ordered at: 01/17/24 1259     Level Of Support Discussed With:    Patient     Code Status (Patient has no pulse and is not breathing):    CPR (Attempt to Resuscitate)     Medical Interventions (Patient has pulse or is breathing):    Full Support       No future " appointments.    Additional Instructions for the Follow-ups that You Need to Schedule       Discharge Follow-up with PCP   As directed       Currently Documented PCP:    Pastora Carbajal APRN    PCP Phone Number:    [phone]     Follow Up Details: PCP Pastora Carbajal 1 week                      Tara Rueda DO  01/19/24      Time Spent on Discharge:  I spent  33  minutes on this discharge activity which included: face-to-face encounter with the patient, reviewing the data in the system, coordination of the care with the nursing staff as well as consultants, documentation, and entering orders.

## 2024-01-20 LAB
GLUCOSE BLDC GLUCOMTR-MCNC: 82 MG/DL (ref 70–130)
GLUCOSE BLDC GLUCOMTR-MCNC: 83 MG/DL (ref 70–130)
GLUCOSE BLDC GLUCOMTR-MCNC: 89 MG/DL (ref 70–130)
GLUCOSE BLDC GLUCOMTR-MCNC: 96 MG/DL (ref 70–130)

## 2024-01-20 PROCEDURE — 82948 REAGENT STRIP/BLOOD GLUCOSE: CPT

## 2024-01-20 PROCEDURE — 99232 SBSQ HOSP IP/OBS MODERATE 35: CPT | Performed by: INTERNAL MEDICINE

## 2024-01-20 PROCEDURE — G0378 HOSPITAL OBSERVATION PER HR: HCPCS

## 2024-01-20 RX ORDER — LISINOPRIL 10 MG/1
20 TABLET ORAL
Status: DISCONTINUED | OUTPATIENT
Start: 2024-01-20 | End: 2024-01-25 | Stop reason: HOSPADM

## 2024-01-20 RX ORDER — AMLODIPINE BESYLATE 5 MG/1
5 TABLET ORAL
Status: DISCONTINUED | OUTPATIENT
Start: 2024-01-20 | End: 2024-01-25 | Stop reason: HOSPADM

## 2024-01-20 RX ADMIN — CLOMIPRAMINE HYDROCHLORIDE 25 MG: 25 CAPSULE ORAL at 21:02

## 2024-01-20 RX ADMIN — RANOLAZINE 500 MG: 500 TABLET, EXTENDED RELEASE ORAL at 21:02

## 2024-01-20 RX ADMIN — ACETAMINOPHEN 650 MG: 325 TABLET ORAL at 21:18

## 2024-01-20 RX ADMIN — Medication 10 ML: at 21:03

## 2024-01-20 RX ADMIN — SERTRALINE HYDROCHLORIDE 200 MG: 100 TABLET ORAL at 08:39

## 2024-01-20 RX ADMIN — GABAPENTIN 300 MG: 300 CAPSULE ORAL at 08:39

## 2024-01-20 RX ADMIN — AMLODIPINE BESYLATE 5 MG: 5 TABLET ORAL at 13:25

## 2024-01-20 RX ADMIN — ATORVASTATIN CALCIUM 80 MG: 40 TABLET, FILM COATED ORAL at 21:02

## 2024-01-20 RX ADMIN — DIVALPROEX SODIUM 500 MG: 250 TABLET, DELAYED RELEASE ORAL at 16:28

## 2024-01-20 RX ADMIN — GABAPENTIN 300 MG: 300 CAPSULE ORAL at 21:02

## 2024-01-20 RX ADMIN — DIVALPROEX SODIUM 500 MG: 250 TABLET, DELAYED RELEASE ORAL at 08:39

## 2024-01-20 RX ADMIN — RANOLAZINE 500 MG: 500 TABLET, EXTENDED RELEASE ORAL at 08:39

## 2024-01-20 RX ADMIN — SENNOSIDES AND DOCUSATE SODIUM 2 TABLET: 8.6; 5 TABLET ORAL at 08:39

## 2024-01-20 RX ADMIN — ASPIRIN 81 MG: 81 TABLET, CHEWABLE ORAL at 08:39

## 2024-01-20 RX ADMIN — LISINOPRIL 20 MG: 20 TABLET ORAL at 13:25

## 2024-01-20 RX ADMIN — SENNOSIDES AND DOCUSATE SODIUM 2 TABLET: 8.6; 5 TABLET ORAL at 21:03

## 2024-01-20 RX ADMIN — DIVALPROEX SODIUM 500 MG: 250 TABLET, DELAYED RELEASE ORAL at 21:02

## 2024-01-20 RX ADMIN — Medication 10 ML: at 08:40

## 2024-01-20 RX ADMIN — GABAPENTIN 300 MG: 300 CAPSULE ORAL at 16:28

## 2024-01-20 NOTE — CASE MANAGEMENT/SOCIAL WORK
Continued Stay Note  [facility]     Patient Name: Olivier Swift  MRN: [id number]  Today's Date: 1/20/2024    Admit Date: 1/17/2024    Plan: [facility] vs home   Discharge Plan       Row Name 01/20/24 0026       Plan    Plan Comments Spoke with patient at bedside and wife via phone. Had questions reqarding when patient may be discharge to rehab facility. Informed them that a referral had already been given to [facility] and  will follow up on Monday. Concerned r/t transport. Made aware that if accepted to [facility], [facility] van runs mon-sat for [place]. Patient and wife verbalized understanding. CM will cont to follow.                   Discharge Codes    No documentation.                 Expected Discharge Date and Time       Expected Discharge Date Expected Discharge Time    Jan 19, 2024               January Condon RN

## 2024-01-20 NOTE — PROGRESS NOTES
[facility] Medicine Services  PROGRESS NOTE    Patient Name: Olivier Swift  : 1980  MRN: [id number]    Date of Admission: 2024  Primary Care Physician: Pastora Carbajal APRN    Subjective   Subjective     CC:  syncope    HPI:  No acute events. States he feels ok today. Reviewed pending rehab and BP.       Objective   Objective     Vital Signs:   Temp:  [97.7 °F (36.5 °C)-98.6 °F (37 °C)] 98 °F (36.7 °C)  Heart Rate:  [57-90] 73  Resp:  [16-18] 17  BP: (112-143)/(68-94) 112/68     Physical Exam:  Constitutional: No acute distress, awake, alert  HENT: NCAT, mucous membranes moist  Respiratory: Clear to auscultation bilaterally, respiratory effort normal   Cardiovascular: RRR, no murmurs, rubs, or gallops  Gastrointestinal: Positive bowel sounds, soft, nontender, nondistended  Musculoskeletal: No bilateral ankle edema  Psychiatric: Appropriate affect, cooperative  Neurologic: Oriented x 3, strength symmetric in all extremities, Cranial Nerves grossly intact to confrontation, speech clear  Skin: No rashes    Results Reviewed:  LAB RESULTS:      Lab 24  0633 24  0657 24  0656 24  0654 24  0653   WBC 7.80  --  9.83  --   --    HEMOGLOBIN 13.5  --  14.4  --   --    HEMOGLOBIN, POC  --   --   --  14.3  --    HEMATOCRIT 40.7  --  43.7  --   --    HEMATOCRIT POC  --   --   --  42  --    PLATELETS 161  --  162  --   --    NEUTROS ABS 3.53  --  4.49  --   --    IMMATURE GRANS (ABS) 0.01  --  0.03  --   --    LYMPHS ABS 3.46*  --  4.23*  --   --    MONOS ABS 0.69  --  0.99*  --   --    EOS ABS 0.07  --  0.05  --   --    MCV 89.3  --  89.4  --   --    LACTATE  --  1.7  --   --   --    PROTIME  --   --   --   --  19.5*   APTT  --   --  33.7  --   --          Lab 24  0633 24  0654   SODIUM 142  --    POTASSIUM 4.2  --    CHLORIDE 107  --    CO2 30.0*  --    ANION GAP 5.0  --    BUN 12  --    CREATININE 1.03 1.50*   EGFR 93.0 59.2*   GLUCOSE 83  --     CALCIUM 8.6  --    HEMOGLOBIN A1C 5.70*  --          Lab 01/17/24  0657   ALT (SGPT) 10   AST (SGOT) 14         Lab 01/17/24  0657 01/17/24  0653   HSTROP T 17  --    PROTIME  --  19.5*   INR  --  1.7*         Lab 01/18/24  0633   CHOLESTEROL 54   LDL CHOL 9   HDL CHOL 28*   TRIGLYCERIDES 82             Brief Urine Lab Results  (Last result in the past 365 days)        Color   Clarity   Blood   Leuk Est   Nitrite   Protein   CREAT   Urine HCG        09/13/23 2046 Yellow   Clear   Negative   Negative   Negative   Negative                   Microbiology Results Abnormal       None            MRI Brain With Contrast    Result Date: 1/18/2024  MRI BRAIN W CONTRAST Date of Exam: 1/18/2024 8:55 PM EST Indication: left sided tingling; follow up FLAIR changes in right parietal lobe seen on noncontrast MRI.  Comparison: Noncontrast MRI brain 1/17/2024 Technique:  Routine multiplanar/multisequence sequence images of the brain were obtained after the uneventful administration of 20 cc Multihance.  Findings: Within the periventricular white matter of the right parietal lobe there is a 10 x 5 mm area of decreased T1 signal. This corresponds to the area of abnormal increased FLAIR signal on prior exam. There is no associated pathologic contrast enhancement and  this would be most consistent with an area of chronic small vessel ischemic disease. There is no pathologic contrast enhancement within the brain or cerebellum. Globes and orbits are within normal limits. Visualized paranasal sinuses appear clear. Dural venous sinuses are patent. Sella and suprasellar cistern appear within normal limits. Craniovertebral junction is within normal limits.     Impression: Impression: 1. No pathologic intracranial contrast enhancement. 2. Previously demonstrated area of abnormal increased T2 signal within the periventricular white matter of the right parietal lobe corresponds to an area of chronic ischemic change. Electronically Signed:  Gregorio Long MD  1/18/2024 9:34 PM EST  Workstation ID: [id number]     Results for orders placed during the hospital encounter of 01/17/24    Adult Transthoracic Echo Complete W/ Cont if Necessary Per Protocol (With Agitated Saline)    Interpretation Summary    Left ventricular ejection fraction appears to be 56 - 60%.    Saline test results are negative.    Estimated right ventricular systolic pressure from tricuspid regurgitation is normal (<35 mmHg). Calculated right ventricular systolic pressure from tricuspid regurgitation is 14 mmHg.      Current medications:  Scheduled Meds:aspirin, 81 mg, Oral, Daily   Or  aspirin, 300 mg, Rectal, Daily  atorvastatin, 80 mg, Oral, Nightly  clomiPRAMINE, 25 mg, Oral, Nightly  divalproex, 500 mg, Oral, TID  gabapentin, 300 mg, Oral, TID  insulin lispro, 2-7 Units, Subcutaneous, 4x Daily AC & at Bedtime  ranolazine, 500 mg, Oral, Q12H  senna-docusate sodium, 2 tablet, Oral, BID  sertraline, 200 mg, Oral, Daily  sodium chloride, 10 mL, Intravenous, Q12H      Continuous Infusions:   PRN Meds:.  acetaminophen    senna-docusate sodium **AND** polyethylene glycol **AND** bisacodyl **AND** bisacodyl    cyclobenzaprine    dextrose    dextrose    glucagon (human recombinant)    sodium chloride    sodium chloride    Assessment & Plan   Assessment & Plan     Active Hospital Problems    Diagnosis  POA    Primary hypertension [I10]  Yes    Type 2 diabetes mellitus with kidney complication, with long-term current use of insulin [E11.29, Z79.4]  Not Applicable    Bipolar 1 disorder [F31.9]  Yes    Anxiety disorder [F41.9]  Yes    Fall [W19.XXXA]  Yes      Resolved Hospital Problems   No resolved problems to display.        Brief Hospital Course to date:  Olivier Swift is a 42 y/o M with a history of hypertension, diabetes, bipolar disorder, and EULOGIO who is presenting with acute altered mental status following a fall. CT head normal. MRI brain with 10x5 mm focus of FLAIR hyperintensity  within the right parietal occipital region. MRI brain with contrast with no pathologic enhancement, area of abnormal signal on MRI brain correspond to area of chronic ischemic changes. Patient was evaluated by neurology and recommended continuing home medications.      Altered mental status  Slurred speech  Fall  -Etiologies include seizure versus TIA versus complex migraine; orthostatic hypotension/ fall related to dysautonomia resulting in concussion/confusion  - imaging per above  - EEG normal  - Orthostatics negative  - Neurology consulted -- appreciate recs   - Recommend continuing depakote 500 mg TID   - Follow up outpatient neurology as needed     Evaluated by PT/OT and rehab recommended. Referral to [facility]      Diabetes  - A1C 5.7  - Continue SSI; resume home medications on DC      HTN  --BP well controlled in the hospital  but now trending up  - Resume amlodipine 5 mg and lisinopril 10 mg daily and monitor.      Elevated creatinine on CKD  - hold home diuretic/ACE on admission  --avoid nephrotoxins  - improved      Anxiety  Bipolar disorder  --cont home meds    Expected Discharge Location and Transportation: rehab  Expected Discharge   Expected Discharge Date: 1/19/2024; Expected Discharge Time:      DVT prophylaxis:  Mechanical DVT prophylaxis orders are present.     AM-PAC 6 Clicks Score (PT): 19 (01/20/24 8129)    CODE STATUS:   Code Status and Medical Interventions:   Ordered at: 01/17/24 1259     Level Of Support Discussed With:    Patient     Code Status (Patient has no pulse and is not breathing):    CPR (Attempt to Resuscitate)     Medical Interventions (Patient has pulse or is breathing):    Full Support       Tara Rueda, DO  01/20/24

## 2024-01-21 ENCOUNTER — APPOINTMENT (OUTPATIENT)
Dept: CARDIOLOGY | Facility: HOSPITAL | Age: 44
End: 2024-01-21
Payer: COMMERCIAL

## 2024-01-21 LAB
BH CV LOWER VASCULAR LEFT COMMON FEMORAL AUGMENT: NORMAL
BH CV LOWER VASCULAR LEFT COMMON FEMORAL COMPRESS: NORMAL
BH CV LOWER VASCULAR LEFT COMMON FEMORAL PHASIC: NORMAL
BH CV LOWER VASCULAR LEFT COMMON FEMORAL SPONT: NORMAL
BH CV LOWER VASCULAR LEFT DISTAL FEMORAL AUGMENT: NORMAL
BH CV LOWER VASCULAR LEFT DISTAL FEMORAL COMPRESS: NORMAL
BH CV LOWER VASCULAR LEFT DISTAL FEMORAL PHASIC: NORMAL
BH CV LOWER VASCULAR LEFT DISTAL FEMORAL SPONT: NORMAL
BH CV LOWER VASCULAR LEFT GASTRONEMIUS COMPRESS: NORMAL
BH CV LOWER VASCULAR LEFT GREATER SAPH AK COMPRESS: NORMAL
BH CV LOWER VASCULAR LEFT GREATER SAPH BK COMPRESS: NORMAL
BH CV LOWER VASCULAR LEFT LESSER SAPH COMPRESS: NORMAL
BH CV LOWER VASCULAR LEFT MID FEMORAL AUGMENT: NORMAL
BH CV LOWER VASCULAR LEFT MID FEMORAL COMPRESS: NORMAL
BH CV LOWER VASCULAR LEFT MID FEMORAL PHASIC: NORMAL
BH CV LOWER VASCULAR LEFT MID FEMORAL SPONT: NORMAL
BH CV LOWER VASCULAR LEFT PERONEAL COMPRESS: NORMAL
BH CV LOWER VASCULAR LEFT POPLITEAL AUGMENT: NORMAL
BH CV LOWER VASCULAR LEFT POPLITEAL COMPRESS: NORMAL
BH CV LOWER VASCULAR LEFT POPLITEAL PHASIC: NORMAL
BH CV LOWER VASCULAR LEFT POPLITEAL SPONT: NORMAL
BH CV LOWER VASCULAR LEFT POSTERIOR TIBIAL COMPRESS: NORMAL
BH CV LOWER VASCULAR LEFT PROXIMAL FEMORAL AUGMENT: NORMAL
BH CV LOWER VASCULAR LEFT PROXIMAL FEMORAL COMPRESS: NORMAL
BH CV LOWER VASCULAR LEFT PROXIMAL FEMORAL PHASIC: NORMAL
BH CV LOWER VASCULAR LEFT PROXIMAL FEMORAL SPONT: NORMAL
BH CV LOWER VASCULAR LEFT SAPHENOFEMORAL JUNCTION AUGMENT: NORMAL
BH CV LOWER VASCULAR LEFT SAPHENOFEMORAL JUNCTION COMPRESS: NORMAL
BH CV LOWER VASCULAR LEFT SAPHENOFEMORAL JUNCTION PHASIC: NORMAL
BH CV LOWER VASCULAR LEFT SAPHENOFEMORAL JUNCTION SPONT: NORMAL
BH CV LOWER VASCULAR RIGHT COMMON FEMORAL AUGMENT: NORMAL
BH CV LOWER VASCULAR RIGHT COMMON FEMORAL COMPRESS: NORMAL
BH CV LOWER VASCULAR RIGHT COMMON FEMORAL PHASIC: NORMAL
BH CV LOWER VASCULAR RIGHT COMMON FEMORAL SPONT: NORMAL
BH CV LOWER VASCULAR RIGHT DISTAL FEMORAL AUGMENT: NORMAL
BH CV LOWER VASCULAR RIGHT DISTAL FEMORAL COMPRESS: NORMAL
BH CV LOWER VASCULAR RIGHT DISTAL FEMORAL PHASIC: NORMAL
BH CV LOWER VASCULAR RIGHT DISTAL FEMORAL SPONT: NORMAL
BH CV LOWER VASCULAR RIGHT GASTRONEMIUS COMPRESS: NORMAL
BH CV LOWER VASCULAR RIGHT GREATER SAPH AK COMPRESS: NORMAL
BH CV LOWER VASCULAR RIGHT GREATER SAPH BK COMPRESS: NORMAL
BH CV LOWER VASCULAR RIGHT LESSER SAPH COMPRESS: NORMAL
BH CV LOWER VASCULAR RIGHT MID FEMORAL AUGMENT: NORMAL
BH CV LOWER VASCULAR RIGHT MID FEMORAL COMPRESS: NORMAL
BH CV LOWER VASCULAR RIGHT MID FEMORAL PHASIC: NORMAL
BH CV LOWER VASCULAR RIGHT MID FEMORAL SPONT: NORMAL
BH CV LOWER VASCULAR RIGHT PERONEAL COMPRESS: NORMAL
BH CV LOWER VASCULAR RIGHT POPLITEAL AUGMENT: NORMAL
BH CV LOWER VASCULAR RIGHT POPLITEAL COMPRESS: NORMAL
BH CV LOWER VASCULAR RIGHT POPLITEAL PHASIC: NORMAL
BH CV LOWER VASCULAR RIGHT POPLITEAL SPONT: NORMAL
BH CV LOWER VASCULAR RIGHT POSTERIOR TIBIAL COMPRESS: NORMAL
BH CV LOWER VASCULAR RIGHT PROXIMAL FEMORAL AUGMENT: NORMAL
BH CV LOWER VASCULAR RIGHT PROXIMAL FEMORAL COMPRESS: NORMAL
BH CV LOWER VASCULAR RIGHT PROXIMAL FEMORAL PHASIC: NORMAL
BH CV LOWER VASCULAR RIGHT PROXIMAL FEMORAL SPONT: NORMAL
BH CV LOWER VASCULAR RIGHT SAPHENOFEMORAL JUNCTION AUGMENT: NORMAL
BH CV LOWER VASCULAR RIGHT SAPHENOFEMORAL JUNCTION COMPRESS: NORMAL
BH CV LOWER VASCULAR RIGHT SAPHENOFEMORAL JUNCTION PHASIC: NORMAL
BH CV LOWER VASCULAR RIGHT SAPHENOFEMORAL JUNCTION SPONT: NORMAL
GLUCOSE BLDC GLUCOMTR-MCNC: 101 MG/DL (ref 70–130)
GLUCOSE BLDC GLUCOMTR-MCNC: 135 MG/DL (ref 70–130)
GLUCOSE BLDC GLUCOMTR-MCNC: 87 MG/DL (ref 70–130)
GLUCOSE BLDC GLUCOMTR-MCNC: 89 MG/DL (ref 70–130)

## 2024-01-21 PROCEDURE — 99232 SBSQ HOSP IP/OBS MODERATE 35: CPT | Performed by: INTERNAL MEDICINE

## 2024-01-21 PROCEDURE — 82948 REAGENT STRIP/BLOOD GLUCOSE: CPT

## 2024-01-21 PROCEDURE — G0378 HOSPITAL OBSERVATION PER HR: HCPCS

## 2024-01-21 PROCEDURE — 93970 EXTREMITY STUDY: CPT

## 2024-01-21 PROCEDURE — 97530 THERAPEUTIC ACTIVITIES: CPT

## 2024-01-21 PROCEDURE — 97116 GAIT TRAINING THERAPY: CPT

## 2024-01-21 PROCEDURE — 96372 THER/PROPH/DIAG INJ SC/IM: CPT

## 2024-01-21 PROCEDURE — 25010000002 ENOXAPARIN PER 10 MG: Performed by: INTERNAL MEDICINE

## 2024-01-21 PROCEDURE — 93970 EXTREMITY STUDY: CPT | Performed by: INTERNAL MEDICINE

## 2024-01-21 RX ORDER — ENOXAPARIN SODIUM 100 MG/ML
40 INJECTION SUBCUTANEOUS DAILY
Status: DISCONTINUED | OUTPATIENT
Start: 2024-01-21 | End: 2024-01-25 | Stop reason: HOSPADM

## 2024-01-21 RX ADMIN — ACETAMINOPHEN 650 MG: 325 TABLET ORAL at 11:35

## 2024-01-21 RX ADMIN — GABAPENTIN 300 MG: 300 CAPSULE ORAL at 15:22

## 2024-01-21 RX ADMIN — SENNOSIDES AND DOCUSATE SODIUM 2 TABLET: 8.6; 5 TABLET ORAL at 08:27

## 2024-01-21 RX ADMIN — LISINOPRIL 20 MG: 20 TABLET ORAL at 15:23

## 2024-01-21 RX ADMIN — DIVALPROEX SODIUM 500 MG: 250 TABLET, DELAYED RELEASE ORAL at 09:49

## 2024-01-21 RX ADMIN — DIVALPROEX SODIUM 500 MG: 250 TABLET, DELAYED RELEASE ORAL at 15:22

## 2024-01-21 RX ADMIN — RANOLAZINE 500 MG: 500 TABLET, EXTENDED RELEASE ORAL at 09:49

## 2024-01-21 RX ADMIN — CYCLOBENZAPRINE 10 MG: 10 TABLET, FILM COATED ORAL at 11:35

## 2024-01-21 RX ADMIN — ATORVASTATIN CALCIUM 80 MG: 40 TABLET, FILM COATED ORAL at 19:43

## 2024-01-21 RX ADMIN — RANOLAZINE 500 MG: 500 TABLET, EXTENDED RELEASE ORAL at 19:42

## 2024-01-21 RX ADMIN — GABAPENTIN 300 MG: 300 CAPSULE ORAL at 19:43

## 2024-01-21 RX ADMIN — CYCLOBENZAPRINE 10 MG: 10 TABLET, FILM COATED ORAL at 19:44

## 2024-01-21 RX ADMIN — ACETAMINOPHEN 650 MG: 325 TABLET ORAL at 19:43

## 2024-01-21 RX ADMIN — Medication 10 ML: at 08:29

## 2024-01-21 RX ADMIN — SERTRALINE HYDROCHLORIDE 200 MG: 100 TABLET ORAL at 08:28

## 2024-01-21 RX ADMIN — ENOXAPARIN SODIUM 40 MG: 100 INJECTION SUBCUTANEOUS at 15:22

## 2024-01-21 RX ADMIN — DIVALPROEX SODIUM 500 MG: 250 TABLET, DELAYED RELEASE ORAL at 19:42

## 2024-01-21 RX ADMIN — GABAPENTIN 300 MG: 300 CAPSULE ORAL at 08:28

## 2024-01-21 RX ADMIN — AMLODIPINE BESYLATE 5 MG: 5 TABLET ORAL at 08:28

## 2024-01-21 RX ADMIN — ASPIRIN 81 MG: 81 TABLET, CHEWABLE ORAL at 08:28

## 2024-01-21 NOTE — THERAPY TREATMENT NOTE
Patient Name: Olivier Swift  : 1980    MRN: [id number]                              Today's Date: 2024       Admit Date: 2024    Visit Dx:     ICD-10-CM ICD-9-CM   1. Confusion  R41.0 298.9   2. Closed head injury, initial encounter  S09.90XA 959.01   3. Accident due to mechanical fall without injury, initial encounter  W19.XXXA E888.9   4. Aphasia  R47.01 784.3   5. Cognitive communication deficit  R41.841 799.52   6. Dysarthria  R47.1 784.51   7. Impaired functional mobility, balance, gait, and endurance  Z74.09 V49.89     Patient Active Problem List   Diagnosis    Primary hypertension    Type 2 diabetes mellitus with kidney complication, with long-term current use of insulin    Bipolar 1 disorder    Anxiety disorder    Fall     Past Medical History:   Diagnosis Date    Anxiety     Bipolar depression     Depression     Diabetes mellitus     Diabetic amyotrophy associated with type 2 diabetes mellitus     Hypertension     Leaky heart valve     Sleep apnea      History reviewed. No pertinent surgical history.   General Information       Row Name 24 1115          Physical Therapy Time and Intention    Document Type therapy note (daily note)  -CD     Mode of Treatment physical therapy  -CD       Row Name 24 1115          General Information    Patient Profile Reviewed yes  -CD     Existing Precautions/Restrictions fall;seizures  DIABETIC AMYOTROPHY, BULGING LUMBAR DISCS, PERIPHERAL NEUROPATHY.  -CD     Barriers to Rehab medically complex;previous functional deficit  -CD       Row Name 24 1115          Cognition    Orientation Status (Cognition) oriented to;person  -CD       Row Name 24 1115          Safety Issues, Functional Mobility    Safety Issues Affecting Function (Mobility) safety precaution awareness;safety precautions follow-through/compliance;insight into deficits/self-awareness;positioning of assistive device;sequencing abilities  -CD     Impairments Affecting  Function (Mobility) balance;strength;sensation/sensory awareness  -CD     Cognitive Impairments, Mobility Safety/Performance insight into deficits/self-awareness;safety precaution awareness;safety precaution follow-through;sequencing abilities  -CD     Comment, Safety Issues/Impairments (Mobility) PT NEEDS CUES FOR SAFETY AND SEQUENCING MOBILITY AS WELL AS PROPER WALKER PLACEMENT. RELIES HEAVILY ON WALKER FOR SUPPORT. C/O PAIN 9/10 B LE  WITH GAIT AND LIMITED BY FATIGUE.,  -CD               User Key  (r) = Recorded By, (t) = Taken By, (c) = Cosigned By      Initials Name Provider Type     Abiola Russell, PT Physical Therapist                   Mobility       Row Name 01/21/24 1119          Bed Mobility    Supine-Sit [place] (Bed Mobility) standby assist  -     Assistive Device (Bed Mobility) bed rails;head of bed elevated  -       Row Name 01/21/24 1119          Transfers    Comment, (Transfers) CUES FOR HAND PLACEMENT. STS FROM EOB AND COMMODE. DEPENDENT WITH HYGIENE AFTER TOILETING.  -       Row Name 01/21/24 1119          Sit-Stand Transfer    Sit-Stand [place] (Transfers) moderate assist (50% patient effort);minimum assist (75% patient effort)  MIN ASSIST FROM EOB AND MOD ASSIST FROM COMMODE.  -CD     Assistive Device (Sit-Stand Transfers) walker, front-wheeled  -CD     Comment, (Sit-Stand Transfer) B LE HYPEREXTEND IN STANDING.  -       Row Name 01/21/24 1119          Gait/Stairs (Locomotion)    [place] Level (Gait) moderate assist (50% patient effort);verbal cues  -CD     Assistive Device (Gait) walker, front-wheeled  -CD     Distance in Feet (Gait) 14+14  -CD     Deviations/Abnormal Patterns (Gait) stride length decreased;weight shifting decreased;gait speed decreased;base of support, wide  -CD     Bilateral Gait Deviations heel strike decreased;forward flexed posture  -CD     Comment, (Gait/Stairs) PT RELYING HEAVILY ON R WALKER FOR SUPPORT. MAGNETIC TYPE GAIT. NEEDS MAX CUES FOR  WALKER PLACEMENT AND UPRIGHT POSTURE AND MANUAL ASSIST TO GUIDE R WALKER. DISTANCE LIMITED BY FATIGUE AND B LE PAIN 9/10.  -CD               User Key  (r) = Recorded By, (t) = Taken By, (c) = Cosigned By      Initials Name Provider Type    Abiola Martínez PT Physical Therapist                   Obj/Interventions       Row Name 01/21/24 1129          Motor Skills    Therapeutic Exercise --  DEFERRED LE THER EX DUE TO PAIN- NSG NOTIFIED.,  -CD       Row Name 01/21/24 1129          Balance    Static Sitting Balance standby assist  -CD     Dynamic Sitting Balance standby assist  -CD     Position, Sitting Balance unsupported;sitting in chair;sitting edge of bed  -CD     Static Standing Balance minimal assist;moderate assist  -CD     Dynamic Standing Balance moderate assist  -CD     Position/Device Used, Standing Balance walker, front-wheeled  -CD     Comment, Balance PT REQUIRING MORE ASSIST FOR BALANCE AND C/O PAIN B LE'S 9/10. RELIES HEAVILY ON R WALKER FOR GAIT SHORT DISTNACE.  -CD               User Key  (r) = Recorded By, (t) = Taken By, (c) = Cosigned By      Initials Name Provider Type    Abiola Martínez PT Physical Therapist                   Goals/Plan    No documentation.                  Clinical Impression       Row Name 01/21/24 1132          Pain    Pretreatment Pain Rating 7/10  -CD     Posttreatment Pain Rating 9/10  -CD     Pain Location - Side/Orientation Bilateral  -CD     Pain Location lower  -CD     Pain Location - extremity  -CD     Pain Intervention(s) Repositioned;Rest;Ambulation/increased activity  NSG NOTIFIED.  -CD       Row Name 01/21/24 1132          Plan of Care Review    Plan of Care Reviewed With patient  -CD     Outcome Evaluation PT RELYING HEAVILY ON R WALKER FOR SUPPORT. DEMONSTRATES MAGNETIC TYPE GAIT AND REQUIRES MANUAL ASSIST TO GUIDE R WALKER. PT  NEEDS MAX CUES FOR WALKER PLACEMENT AND UPRIGHT POSTURE.   GAIT DISTANCE LIMITED BY FATIGUE AND B LE PAIN 9/10. NOTED B KNEE  HYPEREXTENSION IN STANDING. PT REQUIRING MORE ASSIST FOR TRANSFERS AND GAIT.  RECOMMEND IRF AT D/C.  -CD       Row Name 01/21/24 1132          Therapy Assessment/Plan (PT)    Patient/Family Therapy Goals Statement (PT) TO RETURN TO PLOF.  -CD     Rehab Potential (PT) good, to achieve stated therapy goals  -CD     Criteria for Skilled Interventions Met (PT) yes;meets criteria;skilled treatment is necessary  -CD     Therapy Frequency (PT) daily  -CD     Predicted Duration of Therapy Intervention (PT) 2 WEEKS  -CD       Row Name 01/21/24 1132          Vital Signs    Pre SpO2 (%) 94  -CD     O2 Delivery Pre Treatment room air  -CD     O2 Delivery Intra Treatment room air  -CD     Post SpO2 (%) 95  -CD     O2 Delivery Post Treatment room air  -CD     Pre Patient Position Supine  -CD     Intra Patient Position Standing  -CD     Post Patient Position Sitting  -CD       Row Name 01/21/24 1132          Positioning and Restraints    Pre-Treatment Position in bed  -CD     Post Treatment Position chair  -CD     In Chair reclined;call light within reach;encouraged to call for assist;exit alarm on;legs elevated;notified nsg;waffle cushion  PILLOW UNDER KNEES FOR FLEXION TO DECREASE B LE PAIN.  -CD               User Key  (r) = Recorded By, (t) = Taken By, (c) = Cosigned By      Initials Name Provider Type    CD Abiola Russell, PT Physical Therapist                   Outcome Measures       Row Name 01/21/24 1135          How much help from another person do you currently need...    Moving from lying on back to sitting on the side of a flat bed without bedrails? 4  -CD     Moving to and from a bed to a chair (including a wheelchair)? 2  -CD     Standing up from a chair using your arms (e.g., wheelchair, bedside chair)? 2  -CD     Climbing 3-5 steps with a railing? 1  -CD     To walk in hospital room? 2  -CD       Row Name 01/21/24 113          Modified Wilcox Scale    Modified Sudha Scale 4 - Moderately severe disability.  Unable  to walk without assistance, and unable to attend to own bodily needs without assistance.  -CD       Row Name 01/21/24 1138          Functional Assessment    Outcome Measure Options AM-PAC 6 Clicks Basic Mobility (PT)  -CD               User Key  (r) = Recorded By, (t) = Taken By, (c) = Cosigned By      Initials Name Provider Type    CD Abiola Russell, PT Physical Therapist                                 Physical Therapy Education       Title: PT OT SLP Therapies (Done)       Topic: Physical Therapy (Done)       Point: Mobility training (Done)       Learning Progress Summary             Patient Acceptance, E, VU,NR by CD at 1/21/2024 1138    Comment: SEE FLOWSHEET    Acceptance, E, VU by KR at 1/19/2024 1123                         Point: Body mechanics (Done)       Learning Progress Summary             Patient Acceptance, E, VU,NR by CD at 1/21/2024 1138    Comment: SEE FLOWSHEET    Acceptance, E, VU by KR at 1/19/2024 1123                         Point: Precautions (Done)       Learning Progress Summary             Patient Acceptance, E, VU,NR by CD at 1/21/2024 1138    Comment: SEE FLOWSHEET    Acceptance, E, VU by KR at 1/19/2024 1123                                         User Key       Initials Effective Dates Name Provider Type Discipline    CD 02/03/23 -  Abiola Russell, PT Physical Therapist PT    KR 12/30/22 -  Sallie Askew PT Physical Therapist PT                  PT Recommendation and Plan     Plan of Care Reviewed With: patient  Outcome Evaluation: PT RELYING HEAVILY ON R WALKER FOR SUPPORT. DEMONSTRATES MAGNETIC TYPE GAIT AND REQUIRES MANUAL ASSIST TO GUIDE R WALKER. PT  NEEDS MAX CUES FOR WALKER PLACEMENT AND UPRIGHT POSTURE.   GAIT DISTANCE LIMITED BY FATIGUE AND B LE PAIN 9/10. NOTED B KNEE HYPEREXTENSION IN STANDING. PT REQUIRING MORE ASSIST FOR TRANSFERS AND GAIT.  RECOMMEND IRF AT D/C.     Time Calculation:         PT Charges       Row Name 01/21/24 1139             Time Calculation    Start  Time 1035  -CD      PT Received On 01/21/24  -CD         Time Calculation- PT    Total Timed Code Minutes- PT 29 minute(s)  -CD         Timed Charges    63078 - Gait Training Minutes  10  -CD      80922 - PT Therapeutic Activity Minutes 19  -CD         Total Minutes    Timed Charges Total Minutes 29  -CD       Total Minutes 29  -CD                User Key  (r) = Recorded By, (t) = Taken By, (c) = Cosigned By      Initials Name Provider Type    CD Abiola Russell, PT Physical Therapist                  Therapy Charges for Today       Code Description Service Date Service Provider Modifiers Qty    66724176165 HC GAIT TRAINING EA 15 MIN 1/21/2024 Abiola Russell, PT GP 1    32307314318 HC PT THERAPEUTIC ACT EA 15 MIN 1/21/2024 Abiola Russell, PT GP 1            PT G-Codes  Outcome Measure Options: AM-PAC 6 Clicks Basic Mobility (PT)  AM-PAC 6 Clicks Score (PT): 19  AM-PAC 6 Clicks Score (OT): 20  Modified [place] Scale: 4 - Moderately severe disability.  Unable to walk without assistance, and unable to attend to own bodily needs without assistance.  PT Discharge Summary  Anticipated Discharge Disposition (PT): inpatient rehabilitation facility    Abiola Russell, PT  1/21/2024

## 2024-01-21 NOTE — PLAN OF CARE
Goal Outcome Evaluation:  Plan of Care Reviewed With: patient           Outcome Evaluation: PT RELYING HEAVILY ON R WALKER FOR SUPPORT. DEMONSTRATES MAGNETIC TYPE GAIT AND REQUIRES MANUAL ASSIST TO GUIDE R WALKER. PT  NEEDS MAX CUES FOR WALKER PLACEMENT AND UPRIGHT POSTURE.   GAIT DISTANCE LIMITED BY FATIGUE AND B LE PAIN 9/10. NOTED B KNEE HYPEREXTENSION IN STANDING. PT REQUIRING MORE ASSIST FOR TRANSFERS AND GAIT.  RECOMMEND IRF AT D/C.      Anticipated Discharge Disposition (PT): inpatient rehabilitation facility

## 2024-01-21 NOTE — PROGRESS NOTES
[facility] Medicine Services  PROGRESS NOTE    Patient Name: Olivier Swift  : 1980  MRN: [id number]    Date of Admission: 2024  Primary Care Physician: Pastora Carbajal APRN    Subjective   Subjective     CC:  Fall/syncope    HPI:  No acute events. States he is having leg pain. Chronic pain but a bit worse.       Objective   Objective     Vital Signs:   Temp:  [97.7 °F (36.5 °C)-98.2 °F (36.8 °C)] 98.2 °F (36.8 °C)  Heart Rate:  [] 77  Resp:  [16-18] 16  BP: (111-131)/(68-90) 126/90     Physical Exam:  Constitutional: No acute distress, awake, alert; up in the chair  HENT: NCAT, mucous membranes moist  Respiratory: Clear to auscultation bilaterally, respiratory effort normal   Cardiovascular: RRR, no murmurs, rubs, or gallops  Gastrointestinal: Positive bowel sounds, soft, nontender, nondistended  Musculoskeletal: No bilateral ankle edema  Psychiatric: Appropriate affect, cooperative  Neurologic: Oriented x 3, strength symmetric in all extremities, Cranial Nerves grossly intact to confrontation, speech clear  Skin: No rashes      Results Reviewed:  LAB RESULTS:      Lab 24  0633 24  0657 24  0656 24  0654 24  0653   WBC 7.80  --  9.83  --   --    HEMOGLOBIN 13.5  --  14.4  --   --    HEMOGLOBIN, POC  --   --   --  14.3  --    HEMATOCRIT 40.7  --  43.7  --   --    HEMATOCRIT POC  --   --   --  42  --    PLATELETS 161  --  162  --   --    NEUTROS ABS 3.53  --  4.49  --   --    IMMATURE GRANS (ABS) 0.01  --  0.03  --   --    LYMPHS ABS 3.46*  --  4.23*  --   --    MONOS ABS 0.69  --  0.99*  --   --    EOS ABS 0.07  --  0.05  --   --    MCV 89.3  --  89.4  --   --    LACTATE  --  1.7  --   --   --    PROTIME  --   --   --   --  19.5*   APTT  --   --  33.7  --   --          Lab 24  0633 24  0654   SODIUM 142  --    POTASSIUM 4.2  --    CHLORIDE 107  --    CO2 30.0*  --    ANION GAP 5.0  --    BUN 12  --    CREATININE 1.03 1.50*    EGFR 93.0 59.2*   GLUCOSE 83  --    CALCIUM 8.6  --    HEMOGLOBIN A1C 5.70*  --          Lab 01/17/24  0657   ALT (SGPT) 10   AST (SGOT) 14         Lab 01/17/24  0657 01/17/24  0653   HSTROP T 17  --    PROTIME  --  19.5*   INR  --  1.7*         Lab 01/18/24  0633   CHOLESTEROL 54   LDL CHOL 9   HDL CHOL 28*   TRIGLYCERIDES 82             Brief Urine Lab Results  (Last result in the past 365 days)        Color   Clarity   Blood   Leuk Est   Nitrite   Protein   CREAT   Urine HCG        09/13/23 2046 Yellow   Clear   Negative   Negative   Negative   Negative                   Microbiology Results Abnormal       None            No radiology results from the last 24 hrs    Results for orders placed during the hospital encounter of 01/17/24    Adult Transthoracic Echo Complete W/ Cont if Necessary Per Protocol (With Agitated Saline)    Interpretation Summary    Left ventricular ejection fraction appears to be 56 - 60%.    Saline test results are negative.    Estimated right ventricular systolic pressure from tricuspid regurgitation is normal (<35 mmHg). Calculated right ventricular systolic pressure from tricuspid regurgitation is 14 mmHg.      Current medications:  Scheduled Meds:amLODIPine, 5 mg, Oral, Q24H  aspirin, 81 mg, Oral, Daily   Or  aspirin, 300 mg, Rectal, Daily  atorvastatin, 80 mg, Oral, Nightly  clomiPRAMINE, 25 mg, Oral, Nightly  divalproex, 500 mg, Oral, TID  gabapentin, 300 mg, Oral, TID  insulin lispro, 2-7 Units, Subcutaneous, 4x Daily AC & at Bedtime  lisinopril, 20 mg, Oral, Q24H  ranolazine, 500 mg, Oral, Q12H  senna-docusate sodium, 2 tablet, Oral, BID  sertraline, 200 mg, Oral, Daily  sodium chloride, 10 mL, Intravenous, Q12H      Continuous Infusions:   PRN Meds:.  acetaminophen    senna-docusate sodium **AND** polyethylene glycol **AND** bisacodyl **AND** bisacodyl    cyclobenzaprine    dextrose    dextrose    glucagon (human recombinant)    sodium chloride    sodium chloride    Assessment &  Plan   Assessment & Plan     Active Hospital Problems    Diagnosis  POA    Primary hypertension [I10]  Yes    Type 2 diabetes mellitus with kidney complication, with long-term current use of insulin [E11.29, Z79.4]  Not Applicable    Bipolar 1 disorder [F31.9]  Yes    Anxiety disorder [F41.9]  Yes    Fall [W19.XXXA]  Yes      Resolved Hospital Problems   No resolved problems to display.        Brief Hospital Course to date:  Olivier Swift is a 44 y/o M with a history of hypertension, diabetes, bipolar disorder, and EULOGIO who is presenting with acute altered mental status following a fall. CT head normal. MRI brain with 10x5 mm focus of FLAIR hyperintensity within the right parietal occipital region. MRI brain with contrast with no pathologic enhancement, area of abnormal signal on MRI brain correspond to area of chronic ischemic changes. Patient was evaluated by neurology and recommended continuing home medications.      Altered mental status  Slurred speech  Fall  -Etiologies include seizure versus TIA versus complex migraine; orthostatic hypotension/ fall related to dysautonomia resulting in concussion/confusion  - imaging per above  - EEG normal  - Orthostatics negative  - Neurology consulted -- appreciate recs   - Continue home depakote 500 mg TID   - Follow up outpatient neurology as needed      Evaluated by PT/OT and rehab recommended. Referral to [facility]     Lower ext pain  - Duplex pending.      Diabetes  - A1C 5.7  - Continue SSI; resume home medications on DC      HTN  --BP well controlled in the hospital  but now trending up  - Resume amlodipine 5 mg and lisinopril 10 mg daily -- BP improved      Elevated creatinine on CKD  - held home diuretic/ACE on admission  --avoid nephrotoxins  - improved      Anxiety  Bipolar disorder  --cont home meds    Expected Discharge Location and Transportation: rehab  Expected Discharge   Expected Discharge Date: 1/19/2024; Expected Discharge Time:      DVT  prophylaxis:  Mechanical DVT prophylaxis orders are present.     AM-PAC 6 Clicks Score (PT): 19 (01/20/24 0822)    CODE STATUS:   Code Status and Medical Interventions:   Ordered at: 01/17/24 1259     Level Of Support Discussed With:    Patient     Code Status (Patient has no pulse and is not breathing):    CPR (Attempt to Resuscitate)     Medical Interventions (Patient has pulse or is breathing):    Full Support       Tara Rueda, DO  01/21/24

## 2024-01-22 LAB
GLUCOSE BLDC GLUCOMTR-MCNC: 113 MG/DL (ref 70–130)
GLUCOSE BLDC GLUCOMTR-MCNC: 136 MG/DL (ref 70–130)
GLUCOSE BLDC GLUCOMTR-MCNC: 82 MG/DL (ref 70–130)
GLUCOSE BLDC GLUCOMTR-MCNC: 86 MG/DL (ref 70–130)

## 2024-01-22 PROCEDURE — 25010000002 ENOXAPARIN PER 10 MG: Performed by: INTERNAL MEDICINE

## 2024-01-22 PROCEDURE — G0378 HOSPITAL OBSERVATION PER HR: HCPCS

## 2024-01-22 PROCEDURE — 96372 THER/PROPH/DIAG INJ SC/IM: CPT

## 2024-01-22 PROCEDURE — 82948 REAGENT STRIP/BLOOD GLUCOSE: CPT

## 2024-01-22 PROCEDURE — 99232 SBSQ HOSP IP/OBS MODERATE 35: CPT | Performed by: INTERNAL MEDICINE

## 2024-01-22 RX ADMIN — DIVALPROEX SODIUM 500 MG: 250 TABLET, DELAYED RELEASE ORAL at 21:04

## 2024-01-22 RX ADMIN — DIVALPROEX SODIUM 500 MG: 250 TABLET, DELAYED RELEASE ORAL at 08:24

## 2024-01-22 RX ADMIN — GABAPENTIN 300 MG: 300 CAPSULE ORAL at 08:22

## 2024-01-22 RX ADMIN — GABAPENTIN 300 MG: 300 CAPSULE ORAL at 16:47

## 2024-01-22 RX ADMIN — Medication 10 ML: at 08:21

## 2024-01-22 RX ADMIN — SENNOSIDES AND DOCUSATE SODIUM 2 TABLET: 8.6; 5 TABLET ORAL at 21:04

## 2024-01-22 RX ADMIN — ATORVASTATIN CALCIUM 80 MG: 40 TABLET, FILM COATED ORAL at 21:04

## 2024-01-22 RX ADMIN — ENOXAPARIN SODIUM 40 MG: 100 INJECTION SUBCUTANEOUS at 08:35

## 2024-01-22 RX ADMIN — ASPIRIN 81 MG: 81 TABLET, CHEWABLE ORAL at 08:22

## 2024-01-22 RX ADMIN — SERTRALINE HYDROCHLORIDE 200 MG: 100 TABLET ORAL at 08:22

## 2024-01-22 RX ADMIN — DIVALPROEX SODIUM 500 MG: 250 TABLET, DELAYED RELEASE ORAL at 16:47

## 2024-01-22 RX ADMIN — AMLODIPINE BESYLATE 5 MG: 5 TABLET ORAL at 08:22

## 2024-01-22 RX ADMIN — RANOLAZINE 500 MG: 500 TABLET, EXTENDED RELEASE ORAL at 08:24

## 2024-01-22 RX ADMIN — CLOMIPRAMINE HYDROCHLORIDE 25 MG: 25 CAPSULE ORAL at 00:30

## 2024-01-22 RX ADMIN — RANOLAZINE 500 MG: 500 TABLET, EXTENDED RELEASE ORAL at 21:04

## 2024-01-22 RX ADMIN — CLOMIPRAMINE HYDROCHLORIDE 25 MG: 25 CAPSULE ORAL at 21:04

## 2024-01-22 RX ADMIN — GABAPENTIN 300 MG: 300 CAPSULE ORAL at 21:04

## 2024-01-22 RX ADMIN — Medication 10 ML: at 21:05

## 2024-01-22 RX ADMIN — LISINOPRIL 20 MG: 20 TABLET ORAL at 08:22

## 2024-01-22 RX ADMIN — ACETAMINOPHEN 650 MG: 325 TABLET ORAL at 21:12

## 2024-01-22 NOTE — CASE MANAGEMENT/SOCIAL WORK
Continued Stay Note  [facility]     Patient Name: Olivier Swift  MRN: [id number]  Today's Date: 1/22/2024    Admit Date: 1/17/2024    Plan: [facility]   Discharge Plan       Row Name 01/22/24 1342       Plan    Plan [facility]    Plan Comments I spoke with patient earlier today and he agrees to proceed on to [facility]. Alexandra with [facility] is completing the work up.    Final Discharge Disposition Code 62 - inpatient rehab facility                   Discharge Codes    No documentation.                 Expected Discharge Date and Time       Expected Discharge Date Expected Discharge Time    Jan 24, 2024               Moni Bhatt RN

## 2024-01-22 NOTE — PROGRESS NOTES
[facility] Medicine Services  PROGRESS NOTE    Patient Name: Olivier Swift  : 1980  MRN: [id number]    Date of Admission: 2024  Primary Care Physician: Pastora Carbajal APRN    Subjective   Subjective     CC: f/u weakness    HPI: Up in bed with s/o on phone. Ongoing back pain and lle pain. No other issues.      Objective   Objective     Vital Signs:   Temp:  [98.2 °F (36.8 °C)-98.7 °F (37.1 °C)] 98.5 °F (36.9 °C)  Heart Rate:  [] 91  Resp:  [16-18] 18  BP: (103-122)/(73-88) 122/86     Physical Exam:  Constitutional: No acute distress, awake, alert  HENT: NCAT, mucous membranes moist  Respiratory: Clear to auscultation bilaterally, respiratory effort normal   Cardiovascular: RRR, no murmurs, rubs, or gallops  Gastrointestinal: Positive bowel sounds, soft, nontender, nondistended  Musculoskeletal: No bilateral ankle edema  Psychiatric: Appropriate affect, cooperative  Neurologic: Oriented x 3, strength symmetric in all extremities, Cranial Nerves grossly intact to confrontation, speech clear  Skin: No rashes     Results Reviewed:  LAB RESULTS:      Lab 24  0633 24  0657 24  0656 24  0654 24  0653   WBC 7.80  --  9.83  --   --    HEMOGLOBIN 13.5  --  14.4  --   --    HEMOGLOBIN, POC  --   --   --  14.3  --    HEMATOCRIT 40.7  --  43.7  --   --    HEMATOCRIT POC  --   --   --  42  --    PLATELETS 161  --  162  --   --    NEUTROS ABS 3.53  --  4.49  --   --    IMMATURE GRANS (ABS) 0.01  --  0.03  --   --    LYMPHS ABS 3.46*  --  4.23*  --   --    MONOS ABS 0.69  --  0.99*  --   --    EOS ABS 0.07  --  0.05  --   --    MCV 89.3  --  89.4  --   --    LACTATE  --  1.7  --   --   --    PROTIME  --   --   --   --  19.5*   APTT  --   --  33.7  --   --          Lab 24  0633 24  0654   SODIUM 142  --    POTASSIUM 4.2  --    CHLORIDE 107  --    CO2 30.0*  --    ANION GAP 5.0  --    BUN 12  --    CREATININE 1.03 1.50*   EGFR 93.0 59.2*    GLUCOSE 83  --    CALCIUM 8.6  --    HEMOGLOBIN A1C 5.70*  --          Lab 01/17/24  0657   ALT (SGPT) 10   AST (SGOT) 14         Lab 01/17/24  0657 01/17/24  0653   HSTROP T 17  --    PROTIME  --  19.5*   INR  --  1.7*         Lab 01/18/24  0633   CHOLESTEROL 54   LDL CHOL 9   HDL CHOL 28*   TRIGLYCERIDES 82             Brief Urine Lab Results  (Last result in the past 365 days)        Color   Clarity   Blood   Leuk Est   Nitrite   Protein   CREAT   Urine HCG        09/13/23 2046 Yellow   Clear   Negative   Negative   Negative   Negative                   Microbiology Results Abnormal       None            Duplex Venous Lower Extremity - Bilateral CAR    Result Date: 1/21/2024    No evidence of deep or superficial venous thrombosis in the right or left lower extremities      Results for orders placed during the hospital encounter of 01/17/24    Adult Transthoracic Echo Complete W/ Cont if Necessary Per Protocol (With Agitated Saline)    Interpretation Summary    Left ventricular ejection fraction appears to be 56 - 60%.    Saline test results are negative.    Estimated right ventricular systolic pressure from tricuspid regurgitation is normal (<35 mmHg). Calculated right ventricular systolic pressure from tricuspid regurgitation is 14 mmHg.      Current medications:  Scheduled Meds:amLODIPine, 5 mg, Oral, Q24H  aspirin, 81 mg, Oral, Daily   Or  aspirin, 300 mg, Rectal, Daily  atorvastatin, 80 mg, Oral, Nightly  clomiPRAMINE, 25 mg, Oral, Nightly  divalproex, 500 mg, Oral, TID  enoxaparin, 40 mg, Subcutaneous, Daily  gabapentin, 300 mg, Oral, TID  insulin lispro, 2-7 Units, Subcutaneous, 4x Daily AC & at Bedtime  lisinopril, 20 mg, Oral, Q24H  ranolazine, 500 mg, Oral, Q12H  senna-docusate sodium, 2 tablet, Oral, BID  sertraline, 200 mg, Oral, Daily  sodium chloride, 10 mL, Intravenous, Q12H      Continuous Infusions:   PRN Meds:.  acetaminophen    senna-docusate sodium **AND** polyethylene glycol **AND**  bisacodyl **AND** bisacodyl    cyclobenzaprine    dextrose    dextrose    glucagon (human recombinant)    sodium chloride    sodium chloride    Assessment & Plan   Assessment & Plan     Active Hospital Problems    Diagnosis  POA    Primary hypertension [I10]  Yes    Type 2 diabetes mellitus with kidney complication, with long-term current use of insulin [E11.29, Z79.4]  Not Applicable    Bipolar 1 disorder [F31.9]  Yes    Anxiety disorder [F41.9]  Yes    Fall [W19.XXXA]  Yes      Resolved Hospital Problems   No resolved problems to display.        Brief Hospital Course to date:  Olivier Swift is a 42 y/o M with a history of hypertension, diabetes, bipolar disorder, and EULOGIO who is presenting with acute altered mental status following a fall. CT head normal. MRI brain with 10x5 mm focus of FLAIR hyperintensity within the right parietal occipital region. MRI brain with contrast with no pathologic enhancement, area of abnormal signal on MRI brain correspond to area of chronic ischemic changes. Patient was evaluated by neurology and recommended continuing home medications.      Altered mental status  Slurred speech  Fall  -Stroke imaging neg - suspect concussion and post concussive syndrome causing confusion  - EEG normal  - Orthostatics negative  - Neurology consulted -- appreciate recs   - Continue home depakote 500 mg TID   - Follow up outpatient neurology as needed      Evaluated by PT/OT and rehab recommended. Referral to [facility]      Chronic lumbar DDD w/ left sided sciatica  - Followed by Dr. Dietrich and Dr. Montes as outpt. Suggested he follow up with Dr. Dietrich at d/c. Sounds like he recently failed epidural injection and it may be time for him to consider surgery.  - Continue gabapentin - even though looks like he was off as outpt.     Diabetes  ? Peripheral neuropathy  - A1C 5.7  - Continue SSI; resume home medications on DC      HTN  --BP well controlled in the hospital  but now trending up  - Resume  amlodipine 5 mg and lisinopril 10 mg daily -- BP improved      Elevated creatinine on CKD  - held home diuretic/ACE on admission  --avoid nephrotoxins  - improved      Anxiety  Bipolar disorder  --cont home meds    Expected Discharge Location and Transportation:   Expected Discharge   Expected Discharge Date: 1/24/2024; Expected Discharge Time:      DVT prophylaxis:  Medical and mechanical DVT prophylaxis orders are present.     AM-PAC 6 Clicks Score (PT): 16 (01/22/24 1020)    CODE STATUS:   Code Status and Medical Interventions:   Ordered at: 01/17/24 1259     Level Of Support Discussed With:    Patient     Code Status (Patient has no pulse and is not breathing):    CPR (Attempt to Resuscitate)     Medical Interventions (Patient has pulse or is breathing):    Full Support       Ines Tovar II, DO  01/22/24

## 2024-01-23 LAB
GLUCOSE BLDC GLUCOMTR-MCNC: 104 MG/DL (ref 70–130)
GLUCOSE BLDC GLUCOMTR-MCNC: 121 MG/DL (ref 70–130)
GLUCOSE BLDC GLUCOMTR-MCNC: 130 MG/DL (ref 70–130)
GLUCOSE BLDC GLUCOMTR-MCNC: 204 MG/DL (ref 70–130)

## 2024-01-23 PROCEDURE — G0378 HOSPITAL OBSERVATION PER HR: HCPCS

## 2024-01-23 PROCEDURE — 63710000001 INSULIN LISPRO (HUMAN) PER 5 UNITS: Performed by: STUDENT IN AN ORGANIZED HEALTH CARE EDUCATION/TRAINING PROGRAM

## 2024-01-23 PROCEDURE — 99232 SBSQ HOSP IP/OBS MODERATE 35: CPT | Performed by: INTERNAL MEDICINE

## 2024-01-23 PROCEDURE — 97535 SELF CARE MNGMENT TRAINING: CPT

## 2024-01-23 PROCEDURE — 96372 THER/PROPH/DIAG INJ SC/IM: CPT

## 2024-01-23 PROCEDURE — 97116 GAIT TRAINING THERAPY: CPT

## 2024-01-23 PROCEDURE — 92507 TX SP LANG VOICE COMM INDIV: CPT

## 2024-01-23 PROCEDURE — 82948 REAGENT STRIP/BLOOD GLUCOSE: CPT

## 2024-01-23 PROCEDURE — 25010000002 ENOXAPARIN PER 10 MG: Performed by: INTERNAL MEDICINE

## 2024-01-23 RX ORDER — PREGABALIN 25 MG/1
25 CAPSULE ORAL DAILY
Status: DISCONTINUED | OUTPATIENT
Start: 2024-01-23 | End: 2024-01-25 | Stop reason: HOSPADM

## 2024-01-23 RX ADMIN — DIVALPROEX SODIUM 500 MG: 250 TABLET, DELAYED RELEASE ORAL at 20:54

## 2024-01-23 RX ADMIN — Medication 10 ML: at 20:55

## 2024-01-23 RX ADMIN — DIVALPROEX SODIUM 500 MG: 250 TABLET, DELAYED RELEASE ORAL at 08:29

## 2024-01-23 RX ADMIN — Medication 10 ML: at 08:30

## 2024-01-23 RX ADMIN — ATORVASTATIN CALCIUM 80 MG: 40 TABLET, FILM COATED ORAL at 20:54

## 2024-01-23 RX ADMIN — INSULIN LISPRO 3 UNITS: 100 INJECTION, SOLUTION INTRAVENOUS; SUBCUTANEOUS at 17:51

## 2024-01-23 RX ADMIN — SERTRALINE HYDROCHLORIDE 200 MG: 100 TABLET ORAL at 08:29

## 2024-01-23 RX ADMIN — ASPIRIN 81 MG: 81 TABLET, CHEWABLE ORAL at 08:30

## 2024-01-23 RX ADMIN — RANOLAZINE 500 MG: 500 TABLET, EXTENDED RELEASE ORAL at 08:30

## 2024-01-23 RX ADMIN — AMLODIPINE BESYLATE 5 MG: 5 TABLET ORAL at 08:30

## 2024-01-23 RX ADMIN — PREGABALIN 25 MG: 25 CAPSULE ORAL at 11:14

## 2024-01-23 RX ADMIN — CLOMIPRAMINE HYDROCHLORIDE 25 MG: 25 CAPSULE ORAL at 20:54

## 2024-01-23 RX ADMIN — RANOLAZINE 500 MG: 500 TABLET, EXTENDED RELEASE ORAL at 20:54

## 2024-01-23 RX ADMIN — LISINOPRIL 20 MG: 20 TABLET ORAL at 08:30

## 2024-01-23 RX ADMIN — ENOXAPARIN SODIUM 40 MG: 100 INJECTION SUBCUTANEOUS at 08:29

## 2024-01-23 RX ADMIN — DIVALPROEX SODIUM 500 MG: 250 TABLET, DELAYED RELEASE ORAL at 16:07

## 2024-01-23 RX ADMIN — GABAPENTIN 300 MG: 300 CAPSULE ORAL at 08:29

## 2024-01-23 RX ADMIN — SENNOSIDES AND DOCUSATE SODIUM 2 TABLET: 8.6; 5 TABLET ORAL at 08:30

## 2024-01-23 NOTE — THERAPY TREATMENT NOTE
Patient Name: Olivier Swift  : 1980    MRN: [id number]                              Today's Date: 2024       Admit Date: 2024    Visit Dx:     ICD-10-CM ICD-9-CM   1. Confusion  R41.0 298.9   2. Closed head injury, initial encounter  S09.90XA 959.01   3. Accident due to mechanical fall without injury, initial encounter  W19.XXXA E888.9   4. Aphasia  R47.01 784.3   5. Cognitive communication deficit  R41.841 799.52   6. Dysarthria  R47.1 784.51   7. Impaired functional mobility, balance, gait, and endurance  Z74.09 V49.89     Patient Active Problem List   Diagnosis    Primary hypertension    Type 2 diabetes mellitus with kidney complication, with long-term current use of insulin    Bipolar 1 disorder    Anxiety disorder    Fall     Past Medical History:   Diagnosis Date    Anxiety     Bipolar depression     Depression     Diabetes mellitus     Diabetic amyotrophy associated with type 2 diabetes mellitus     Hypertension     Leaky heart valve     Sleep apnea      History reviewed. No pertinent surgical history.   General Information       Row Name 24 1520          OT Time and Intention    Document Type therapy note (daily note)  -     Mode of Treatment occupational therapy  -       Row Name 24 1520          General Information    Prior Level of Function --  See IE  -     Existing Precautions/Restrictions fall;seizures  -     Barriers to Rehab medically complex  -LC       Row Name 24 1520          Cognition    Orientation Status (Cognition) oriented x 3  -LC       Row Name 24 1520          Safety Issues, Functional Mobility    Safety Issues Affecting Function (Mobility) awareness of need for assistance;insight into deficits/self-awareness;safety precaution awareness;safety precautions follow-through/compliance;sequencing abilities  -     Impairments Affecting Function (Mobility) balance;sensation/sensory awareness;strength;pain  -LC               User Key  (r) =  Recorded By, (t) = Taken By, (c) = Cosigned By      Initials Name Provider Type     Maddy Jimenez, OT Occupational Therapist                     Mobility/ADL's       Row Name 01/23/24 1523          Bed Mobility    Bed Mobility scooting/bridging;supine-sit  -     Scooting/Bridging [place] (Bed Mobility) standby assist  -     Supine-Sit [place] (Bed Mobility) standby assist  -     Assistive Device (Bed Mobility) bed rails;head of bed elevated  -     Comment, (Bed Mobility) VC's to sequence, initial dizziness, quickly resolved  -       Row Name 01/23/24 1523          Transfers    Transfers sit-stand transfer;toilet transfer  -     Comment, (Transfers) VC's for hand placement and sequencing.  -       Row Name 01/23/24 1523          Sit-Stand Transfer    Sit-Stand [place] (Transfers) minimum assist (75% patient effort);verbal cues  -     Assistive Device (Sit-Stand Transfers) walker, front-wheeled  -       Row Name 01/23/24 1523          Toilet Transfer    Type (Toilet Transfer) sit-stand;stand-sit  -     [place] Level (Toilet Transfer) minimum assist (75% patient effort);verbal cues  -     Assistive Device (Toilet Transfer) commode;grab bars/safety frame  -[facility] Name 01/23/24 [facility]          Functional Mobility    Functional Mobility- Ind. Level minimum assist (75% patient effort)  -     Functional Mobility- Device walker, front-wheeled  -     Functional Mobility-Distance (Feet) 30  -     Functional Mobility- Comment VC's for upright posture and assist to manage RW.  -       Row Name 01/23/24 1523          Activities of Daily Living    BADL Assessment/Intervention toileting  -       Row Name 01/23/24 1523          Toileting Assessment/Training    [place] Level (Toileting) adjust/manage clothing;perform perineal hygiene;minimum assist (75% patient effort);verbal cues  -     Assistive Devices (Toileting) commode;grab bar/safety frame  -     Comment,  (Toileting) Assist to pull pants up and down  -               User Key  (r) = Recorded By, (t) = Taken By, (c) = Cosigned By      Initials Name Provider Type    Maddy Delatorre OT Occupational Therapist                   Obj/Interventions       Row Name 01/23/24 1530          Motor Skills    Motor Skills functional endurance  -       Row Name 01/23/24 1530          Balance    Balance Assessment sitting static balance;sitting dynamic balance;standing static balance;standing dynamic balance  -     Static Sitting Balance standby assist  -     Dynamic Sitting Balance standby assist  -     Position, Sitting Balance unsupported;sitting in chair  -     Static Standing Balance contact guard;verbal cues  -     Dynamic Standing Balance minimal assist;verbal cues  -     Position/Device Used, Standing Balance supported;walker, front-wheeled  -     Balance Interventions sitting;standing;sit to stand;supported;occupation based/functional task;weight shifting activity  -     Comment, Balance Min A to steady  -               User Key  (r) = Recorded By, (t) = Taken By, (c) = Cosigned By      Initials Name Provider Type     Maddy Jimenez OT Occupational Therapist                   Goals/Plan    No documentation.                  Clinical Impression       Row Name 01/23/24 1534          Pain Assessment    Pretreatment Pain Rating 8/10  -     Posttreatment Pain Rating 8/10  -     Pain Location - Side/Orientation Bilateral  -     Pain Location generalized  -     Pain Location - back  -     Pain Intervention(s) Repositioned;Ambulation/increased activity  -     Additional Documentation Pain Scale: Word Pre/Post-Treatment (Group)  -       Row Name 01/23/24 1534          Plan of Care Review    Plan of Care Reviewed With patient  -     Progress no change  -     Outcome Evaluation Pt. progressing towards baseline with ADLs and functional mobility. Completed bed mobility and T/Fs with Min A.  Toileting with Min A. Will Conitnues to be limited by decreased activity tolerance. Will progress as able per OT POC.  -       Row Name 01/23/24 1534          Positioning and Restraints    Pre-Treatment Position in bed  -     Post Treatment Position bed  -LC     In Bed notified nsg;supine;call light within reach;encouraged to call for assist;exit alarm on  -               User Key  (r) = Recorded By, (t) = Taken By, (c) = Cosigned By      Initials Name Provider Type     Maddy Jimenez OT Occupational Therapist                   Outcome Measures       Row Name 01/23/24 1538          How much help from another is currently needed...    Putting on and taking off regular lower body clothing? 3  -LC     Bathing (including washing, rinsing, and drying) 3  -LC     Toileting (which includes using toilet bed pan or urinal) 3  -LC     Putting on and taking off regular upper body clothing 4  -LC     Taking care of personal grooming (such as brushing teeth) 3  -LC     Eating meals 4  -LC     AM-PAC 6 Clicks Score (OT) 20  -LC       Row Name 01/23/24 0948 01/23/24 0800       How much help from another person do you currently need...    Turning from your back to your side while in flat bed without using bedrails? 4  -CM 4  -JK    Moving from lying on back to sitting on the side of a flat bed without bedrails? 3  -CM 3  -JK    Moving to and from a bed to a chair (including a wheelchair)? 3  -CM 3  -JK    Standing up from a chair using your arms (e.g., wheelchair, bedside chair)? 2  -CM 3  -JK    Climbing 3-5 steps with a railing? 2  -CM 2  -JK    To walk in hospital room? 3  -CM 3  -JK    AM-PAC 6 Clicks Score (PT) 17  -CM 18  -JK    Highest Level of Mobility Goal 5 --> Static standing  -CM 6 --> Walk 10 steps or more  -JK      Row Name 01/23/24 1538 01/23/24 0948       Functional Assessment    Outcome Measure Options AM-PAC 6 Clicks Daily Activity (OT)  - AM-PAC 6 Clicks Basic Mobility (PT)  -CM              User Key   (r) = Recorded By, (t) = Taken By, (c) = Cosigned By      Initials Name Provider Type     Maddy Jimenez OT Occupational Therapist    Madai Santana, PT Physical Therapist    Josiane Bonilla, RN Registered Nurse                    Occupational Therapy Education       Title: PT OT SLP Therapies (In Progress)       Topic: Occupational Therapy (In Progress)       Point: ADL training (In Progress)       Description:   Instruct learner(s) on proper safety adaptation and remediation techniques during self care or transfers.   Instruct in proper use of assistive devices.                  Learning Progress Summary             Patient Acceptance, E, NR by  at 1/23/2024 1350                         Point: Home exercise program (Not Started)       Description:   Instruct learner(s) on appropriate technique for monitoring, assisting and/or progressing therapeutic exercises/activities.                  Learner Progress:  Not documented in this visit.              Point: Precautions (In Progress)       Description:   Instruct learner(s) on prescribed precautions during self-care and functional transfers.                  Learning Progress Summary             Patient Acceptance, E, NR by  at 1/23/2024 1350                         Point: Body mechanics (In Progress)       Description:   Instruct learner(s) on proper positioning and spine alignment during self-care, functional mobility activities and/or exercises.                  Learning Progress Summary             Patient Acceptance, E, NR by  at 1/23/2024 1350                                         User Key       Initials Effective Dates Name Provider Type [facility] 06/16/21 -  Maddy Jimenez OT Occupational Therapist OT                  OT Recommendation and Plan     Plan of Care Review  Plan of Care Reviewed With: patient  Progress: no change  Outcome Evaluation: Pt. progressing towards baseline with ADLs and functional mobility. Completed bed  mobility and T/Fs with Min A. Toileting with Min A. Will Conitnues to be limited by decreased activity tolerance. Will progress as able per OT POC.     Time Calculation:         Time Calculation- OT       Row Name 01/23/24 1540 01/23/24 0949          Time Calculation- OT    OT Start Time 1350  -LC --     OT Received On 01/23/24  - --     OT Goal Re-Cert Due Date 01/28/24  -LC --        Timed Charges    57251 - Gait Training Minutes  -- 24  -CM     06565 - OT Self Care/Mgmt Minutes 15  -LC --        Total Minutes    Timed Charges Total Minutes 15  -LC 24  -CM      Total Minutes 15  -LC 24  -CM               User Key  (r) = Recorded By, (t) = Taken By, (c) = Cosigned By      Initials Name Provider Type    LC Maddy Jimenez OT Occupational Therapist    CM Madai Celis, PT Physical Therapist                  Therapy Charges for Today       Code Description Service Date Service Provider Modifiers Qty    89444036770 HC OT SELF CARE/MGMT/TRAIN EA 15 MIN 1/23/2024 Maddy Jimenez OT GO 1                 Maddy Jimenez OT  1/23/2024

## 2024-01-23 NOTE — PLAN OF CARE
Goal Outcome Evaluation:  Plan of Care Reviewed With: patient        Progress: improving  Outcome Evaluation: Patient showing improvement, he required less assistance to ambulate 15'+15' Sonya with FWW. He fatigues very quickly and requires a seated rest break. Also continues to be limited by 9/10 back pain, RN aware. IPPT remains indicated to address current deficits. Continue to recommend D/C to IPR for best functional outcome.      Anticipated Discharge Disposition (PT): inpatient rehabilitation facility

## 2024-01-23 NOTE — THERAPY TREATMENT NOTE
Acute Care - Speech Language Pathology Progress Note  [facility]     Patient Name: Olivier Swift  : 1980  MRN: [id number]  Today's Date: 2024               Admit Date: 2024     Visit Dx:    ICD-10-CM ICD-9-CM   1. Confusion  R41.0 298.9   2. Closed head injury, initial encounter  S09.90XA 959.01   3. Accident due to mechanical fall without injury, initial encounter  W19.XXXA E888.9   4. Aphasia  R47.01 784.3   5. Cognitive communication deficit  R41.841 799.52   6. Dysarthria  R47.1 784.51   7. Impaired functional mobility, balance, gait, and endurance  Z74.09 V49.89     Patient Active Problem List   Diagnosis    Primary hypertension    Type 2 diabetes mellitus with kidney complication, with long-term current use of insulin    Bipolar 1 disorder    Anxiety disorder    Fall     Past Medical History:   Diagnosis Date    Anxiety     Bipolar depression     Depression     Diabetes mellitus     Diabetic amyotrophy associated with type 2 diabetes mellitus     Hypertension     Leaky heart valve     Sleep apnea      History reviewed. No pertinent surgical history.    SLP Recommendation and Plan  SLP Diagnosis: mild, cognitive-linguistic disorder (24 1000)           SLC Criteria for Skilled Therapy Interventions Met: yes (24 1000)  Anticipated Discharge Disposition (SLP): inpatient rehabilitation facility (24 1000)     Therapy Frequency (Swallow): 3 days per week (24 1000)  Therapy Frequency (SLP SLC): 3 days per week (24 1000)  Predicted Duration Therapy Intervention (Days): until discharge (24 1000)        Daily Summary of Progress (SLP): progress toward functional goals is good (24 1000)           Treatment Assessment (SLP): continued, improved (24 1000)     Plan for Continued Treatment (SLP): continue treatment per plan of care (24 1000)  Plan of Care Reviewed With: patient (24 1005)  Progress: improving (24 1005)      SLP EVALUATION  (last 72 hours)       SLP SLC Evaluation       Row Name 01/23/24 1000                   SLP Evaluation Clinical Impressions    SLP Diagnosis mild;cognitive-linguistic disorder  -AV        Rehab Potential/Prognosis good  -AV        SLC Criteria for Skilled Therapy Interventions Met yes  -AV           Recommendations    Therapy Frequency (SLP SLC) 3 days per week  -AV        Anticipated Discharge Disposition (SLP) inpatient rehabilitation facility  -AV                  User Key  (r) = Recorded By, (t) = Taken By, (c) = Cosigned By      Initials Name Effective Dates    AV January Hinkle, MS CCC-SLP 06/16/21 -                        EDUCATION  The patient has been educated in the following areas:     Cognitive Impairment Communication Impairment.           SLP GOALS       Row Name 01/23/24 0900             Patient will demonstrate functional speech skills for return to discharge environment    [place] Independently  -AV      Time frame by discharge  -AV      Progress/Outcomes continuing progress toward goal  -AV         Patient will demonstrate functional cognitive-linguistic skills for return to discharge environment    [place] Independently  -AV      Time frame by discharge  -AV      Progress/Outcomes continuing progress toward goal  -AV         SLP Diagnostic Treatment     Patient will participate in further assessment in the following areas reading comprehension;graphic expression  -AV      Time Frame (Diagnostic) short term goal (STG)  -AV      Progress/Outcomes (Additional Goal 1, SLP) goal met  -AV         Phonation Goal 1 (SLP)    Improve Phonation By Goal 1 (SLP) using loud speech;80%;with minimal cues (75-90%)  -AV      Time Frame (Phonation Goal 1, SLP) short term goal (STG)  -AV      Progress (Phonation Goal 1, SLP) 90%;with minimal cues (75-90%)  -AV      Progress/Outcomes (Phonation Goal 1, SLP) continuing progress toward goal  -AV         Articulation Goal 1 (SLP)    Improve  Articulation Goal 1 (SLP) by over-articulating in connected speech;80%;with minimal cues (75-90%)  -AV      Time Frame (Articulation Goal 1, SLP) short term goal (STG)  -AV      Progress (Articulation Goal 1, SLP) 90%;with minimal cues (75-90%)  -AV      Progress/Outcomes (Articulation Goal 1, SLP) continuing progress toward goal  -AV         Attention Goal 1 (SLP)    Improve Attention by Goal 1 (SLP) complete selective attention task;complete sustained attention task;80%;with minimal cues (75-90%)  -AV      Time Frame (Attention Goal 1, SLP) short term goal (STG)  -AV      Progress (Attention Goal 1, SLP) 80%;with minimal cues (75-90%)  -AV      Progress/Outcomes (Attention Goal 1, SLP) continuing progress toward goal  -AV         Memory Skills Goal 1 (SLP)    Improve Memory Skills Through Goal 1 (SLP) recalling related word lists immediately;recalling unrelated word lists immediately;80%;with minimal cues (75-90%)  -AV      Time Frame (Memory Skills Goal 1, SLP) short term goal (STG)  -AV      Progress (Memory Skills Goal 1, SLP) 80%;with minimal cues (75-90%)  -AV      Progress/Outcomes (Memory Skills Goal 1, SLP) continuing progress toward goal  -AV         Organizational Skills Goal 1 (SLP)    Improve Thought Organization Through Goal 1 (SLP) completing a divergent naming task;completing a convergent naming task;80%;with minimal cues (75-90%)  -AV      Time Frame (Thought Organization Skills Goal 1, SLP) short term goal (STG)  -AV      Progress (Thought Organization Skills Goal 1, SLP) 70%;with minimal cues (75-90%)  -AV      Progress/Outcomes (Thought Organization Skills Goal 1, SLP) continuing progress toward goal  -AV                User Key  (r) = Recorded By, (t) = Taken By, (c) = Cosigned By      Initials Name Provider Type    AV January Hinkle MS CCC-SLP Speech and Language Pathologist                            Time Calculation:      Time Calculation- SLP       Row Name 01/23/24 2826              Time Calculation- SLP    SLP Start Time 0945  -AV      SLP Received On 01/23/24  -AV         Untimed Charges    21879-IS Treatment/ST Modification Prosth Aug Alter  38  -AV         Total Minutes    Untimed Charges Total Minutes 38  -AV       Total Minutes 38  -AV                User Key  (r) = Recorded By, (t) = Taken By, (c) = Cosigned By      Initials Name Provider Type    AV January Hinkle, MS CCC-SLP Speech and Language Pathologist                    Therapy Charges for Today       Code Description Service Date Service Provider Modifiers Qty    [id number]  ST TREATMENT SPEECH 3 1/23/2024 January Hinkle, MS CCC-SLP GN 1                       January Lr MS CCC-SLP  1/23/2024

## 2024-01-23 NOTE — THERAPY EVALUATION
Patient Name: Olivier Swift  : 1980    MRN: [id number]                              Today's Date: 2024       Admit Date: 2024    Visit Dx:     ICD-10-CM ICD-9-CM   1. Confusion  R41.0 298.9   2. Closed head injury, initial encounter  S09.90XA 959.01   3. Accident due to mechanical fall without injury, initial encounter  W19.XXXA E888.9   4. Aphasia  R47.01 784.3   5. Cognitive communication deficit  R41.841 799.52   6. Dysarthria  R47.1 784.51   7. Impaired functional mobility, balance, gait, and endurance  Z74.09 V49.89     Patient Active Problem List   Diagnosis    Primary hypertension    Type 2 diabetes mellitus with kidney complication, with long-term current use of insulin    Bipolar 1 disorder    Anxiety disorder    Fall     Past Medical History:   Diagnosis Date    Anxiety     Bipolar depression     Depression     Diabetes mellitus     Diabetic amyotrophy associated with type 2 diabetes mellitus     Hypertension     Leaky heart valve     Sleep apnea      History reviewed. No pertinent surgical history.   General Information       Row Name 24 1520          OT Time and Intention    Document Type therapy note (daily note)  -     Mode of Treatment occupational therapy  -       Row Name 24 1520          General Information    Prior Level of Function --  See IE  -     Existing Precautions/Restrictions fall;seizures  -     Barriers to Rehab medically complex  -LC       Row Name 24 1520          Cognition    Orientation Status (Cognition) oriented x 3  -LC       Row Name 24 1520          Safety Issues, Functional Mobility    Safety Issues Affecting Function (Mobility) awareness of need for assistance;insight into deficits/self-awareness;safety precaution awareness;safety precautions follow-through/compliance;sequencing abilities  -     Impairments Affecting Function (Mobility) balance;sensation/sensory awareness;strength;pain  -LC               User Key  (r) =  Recorded By, (t) = Taken By, (c) = Cosigned By      Initials Name Provider Type     Maddy Jimenez, OT Occupational Therapist                     Mobility/ADL's       Row Name 01/23/24 1523          Bed Mobility    Bed Mobility scooting/bridging;supine-sit  -     Scooting/Bridging [place] (Bed Mobility) standby assist  -     Supine-Sit [place] (Bed Mobility) standby assist  -     Assistive Device (Bed Mobility) bed rails;head of bed elevated  -     Comment, (Bed Mobility) VC's to sequence, initial dizziness, quickly resolved  -       Row Name 01/23/24 1523          Transfers    Transfers sit-stand transfer;toilet transfer  -     Comment, (Transfers) VC's for hand placement and sequencing.  -       Row Name 01/23/24 1523          Sit-Stand Transfer    Sit-Stand [place] (Transfers) minimum assist (75% patient effort);verbal cues  -     Assistive Device (Sit-Stand Transfers) walker, front-wheeled  -       Row Name 01/23/24 1523          Toilet Transfer    Type (Toilet Transfer) sit-stand;stand-sit  -     [place] Level (Toilet Transfer) minimum assist (75% patient effort);verbal cues  -     Assistive Device (Toilet Transfer) commode;grab bars/safety frame  -[facility] Name 01/23/24 [facility]          Functional Mobility    Functional Mobility- Ind. Level minimum assist (75% patient effort)  -     Functional Mobility- Device walker, front-wheeled  -     Functional Mobility-Distance (Feet) 30  -     Functional Mobility- Comment VC's for upright posture and assist to manage RW.  -       Row Name 01/23/24 1523          Activities of Daily Living    BADL Assessment/Intervention toileting  -       Row Name 01/23/24 1523          Toileting Assessment/Training    [place] Level (Toileting) adjust/manage clothing;perform perineal hygiene;minimum assist (75% patient effort);verbal cues  -     Assistive Devices (Toileting) commode;grab bar/safety frame  -     Comment,  (Toileting) Assist to pull pants up and down  -               User Key  (r) = Recorded By, (t) = Taken By, (c) = Cosigned By      Initials Name Provider Type    Maddy Delatorre OT Occupational Therapist                   Obj/Interventions       Row Name 01/23/24 1530          Motor Skills    Motor Skills functional endurance  -       Row Name 01/23/24 1530          Balance    Balance Assessment sitting static balance;sitting dynamic balance;standing static balance;standing dynamic balance  -     Static Sitting Balance standby assist  -     Dynamic Sitting Balance standby assist  -     Position, Sitting Balance unsupported;sitting in chair  -     Static Standing Balance contact guard;verbal cues  -     Dynamic Standing Balance minimal assist;verbal cues  -     Position/Device Used, Standing Balance supported;walker, front-wheeled  -     Balance Interventions sitting;standing;sit to stand;supported;occupation based/functional task;weight shifting activity  -     Comment, Balance Min A to steady  -               User Key  (r) = Recorded By, (t) = Taken By, (c) = Cosigned By      Initials Name Provider Type     Maddy Jimenez OT Occupational Therapist                   Goals/Plan    No documentation.                  Clinical Impression       Row Name 01/23/24 1534          Pain Assessment    Pretreatment Pain Rating 8/10  -     Posttreatment Pain Rating 8/10  -     Pain Location - Side/Orientation Bilateral  -     Pain Location generalized  -     Pain Location - back  -     Pain Intervention(s) Repositioned;Ambulation/increased activity  -     Additional Documentation Pain Scale: Word Pre/Post-Treatment (Group)  -       Row Name 01/23/24 1534          Plan of Care Review    Plan of Care Reviewed With patient  -     Progress no change  -     Outcome Evaluation Pt. progressing towards baseline with ADLs and functional mobility. Completed bed mobility and T/Fs with Min A.  Toileting with Min A. Will Conitnues to be limited by decreased activity tolerance. Will progress as able per OT POC.  -       Row Name 01/23/24 1534          Positioning and Restraints    Pre-Treatment Position in bed  -     Post Treatment Position bed  -LC     In Bed notified nsg;supine;call light within reach;encouraged to call for assist;exit alarm on  -               User Key  (r) = Recorded By, (t) = Taken By, (c) = Cosigned By      Initials Name Provider Type     Maddy Jimenez OT Occupational Therapist                   Outcome Measures       Row Name 01/23/24 1538          How much help from another is currently needed...    Putting on and taking off regular lower body clothing? 3  -LC     Bathing (including washing, rinsing, and drying) 3  -LC     Toileting (which includes using toilet bed pan or urinal) 3  -LC     Putting on and taking off regular upper body clothing 4  -LC     Taking care of personal grooming (such as brushing teeth) 3  -LC     Eating meals 4  -LC     AM-PAC 6 Clicks Score (OT) 20  -LC       Row Name 01/23/24 0948 01/23/24 0800       How much help from another person do you currently need...    Turning from your back to your side while in flat bed without using bedrails? 4  -CM 4  -JK    Moving from lying on back to sitting on the side of a flat bed without bedrails? 3  -CM 3  -JK    Moving to and from a bed to a chair (including a wheelchair)? 3  -CM 3  -JK    Standing up from a chair using your arms (e.g., wheelchair, bedside chair)? 2  -CM 3  -JK    Climbing 3-5 steps with a railing? 2  -CM 2  -JK    To walk in hospital room? 3  -CM 3  -JK    AM-PAC 6 Clicks Score (PT) 17  -CM 18  -JK    Highest Level of Mobility Goal 5 --> Static standing  -CM 6 --> Walk 10 steps or more  -JK      Row Name 01/23/24 1538 01/23/24 0948       Functional Assessment    Outcome Measure Options AM-PAC 6 Clicks Daily Activity (OT)  - AM-PAC 6 Clicks Basic Mobility (PT)  -CM              User Key   (r) = Recorded By, (t) = Taken By, (c) = Cosigned By      Initials Name Provider Type     Maddy Jimenez OT Occupational Therapist    Madai Santana, PT Physical Therapist    Josiane Bonilla, RN Registered Nurse                    Occupational Therapy Education       Title: PT OT SLP Therapies (In Progress)       Topic: Occupational Therapy (In Progress)       Point: ADL training (In Progress)       Description:   Instruct learner(s) on proper safety adaptation and remediation techniques during self care or transfers.   Instruct in proper use of assistive devices.                  Learning Progress Summary             Patient Acceptance, E, NR by  at 1/23/2024 1350                         Point: Home exercise program (Not Started)       Description:   Instruct learner(s) on appropriate technique for monitoring, assisting and/or progressing therapeutic exercises/activities.                  Learner Progress:  Not documented in this visit.              Point: Precautions (In Progress)       Description:   Instruct learner(s) on prescribed precautions during self-care and functional transfers.                  Learning Progress Summary             Patient Acceptance, E, NR by  at 1/23/2024 1350                         Point: Body mechanics (In Progress)       Description:   Instruct learner(s) on proper positioning and spine alignment during self-care, functional mobility activities and/or exercises.                  Learning Progress Summary             Patient Acceptance, E, NR by  at 1/23/2024 1350                                         User Key       Initials Effective Dates Name Provider Type [facility] 06/16/21 -  Maddy Jimenez OT Occupational Therapist OT                  OT Recommendation and Plan     Plan of Care Review  Plan of Care Reviewed With: patient  Progress: no change  Outcome Evaluation: Pt. progressing towards baseline with ADLs and functional mobility. Completed bed  mobility and T/Fs with Min A. Toileting with Min A. Will Conitnues to be limited by decreased activity tolerance. Will progress as able per OT POC.     Time Calculation:         Time Calculation- OT       Row Name 01/23/24 1540 01/23/24 0949          Time Calculation- OT    OT Start Time 1350  -LC --     OT Received On 01/23/24  - --     OT Goal Re-Cert Due Date 01/28/24  -LC --        Timed Charges    24282 - Gait Training Minutes  -- 24  -CM     66876 - OT Self Care/Mgmt Minutes 15  -LC --        Total Minutes    Timed Charges Total Minutes 15  -LC 24  -CM      Total Minutes 15  -LC 24  -CM               User Key  (r) = Recorded By, (t) = Taken By, (c) = Cosigned By      Initials Name Provider Type    LC Maddy Jimenez OT Occupational Therapist    CM Madai Celis, PT Physical Therapist                  Therapy Charges for Today       Code Description Service Date Service Provider Modifiers Qty    [id number] HC OT SELF CARE/MGMT/TRAIN EA 15 MIN 1/23/2024 Maddy Jimenez OT GO 1                 Maddy Jimenez OT  1/23/2024

## 2024-01-23 NOTE — PLAN OF CARE
Goal Outcome Evaluation:  Plan of Care Reviewed With: patient        Progress: no change  Outcome Evaluation: Pt. progressing towards baseline with ADLs and functional mobility. Completed bed mobility and T/Fs with Min A. Toileting with Min A. Will Conitnues to be limited by decreased activity tolerance. Will progress as able per OT POC.

## 2024-01-23 NOTE — PROGRESS NOTES
[facility] Medicine Services  PROGRESS NOTE    Patient Name: Olivier Swift  : 1980  MRN: [id number]    Date of Admission: 2024  Primary Care Physician: Pastora Carbajal APRN    Subjective   Subjective     CC: f/u back pain    HPI: Up in bed with nursing in room. Still with pain, but doing okay. No new complaints.      Objective   Objective     Vital Signs:   Temp:  [97 °F (36.1 °C)-98.1 °F (36.7 °C)] 97.8 °F (36.6 °C)  Heart Rate:  [] 79  Resp:  [16-18] 18  BP: (112-132)/(80-88) 117/82     Physical Exam:  Constitutional: No acute distress, awake, alert  HENT: NCAT, mucous membranes moist  Respiratory: Clear to auscultation bilaterally, respiratory effort normal   Cardiovascular: RRR, no murmurs, rubs, or gallops  Gastrointestinal: Positive bowel sounds, soft, nontender, nondistended  Musculoskeletal: No bilateral ankle edema  Psychiatric: Appropriate affect, cooperative  Neurologic: Oriented x 3, strength symmetric in all extremities, Cranial Nerves grossly intact to confrontation, speech clear  Skin: No rashes     Results Reviewed:  LAB RESULTS:      Lab 24  0633 24  0657 24  0656 24  0654 24  0653   WBC 7.80  --  9.83  --   --    HEMOGLOBIN 13.5  --  14.4  --   --    HEMOGLOBIN, POC  --   --   --  14.3  --    HEMATOCRIT 40.7  --  43.7  --   --    HEMATOCRIT POC  --   --   --  42  --    PLATELETS 161  --  162  --   --    NEUTROS ABS 3.53  --  4.49  --   --    IMMATURE GRANS (ABS) 0.01  --  0.03  --   --    LYMPHS ABS 3.46*  --  4.23*  --   --    MONOS ABS 0.69  --  0.99*  --   --    EOS ABS 0.07  --  0.05  --   --    MCV 89.3  --  89.4  --   --    LACTATE  --  1.7  --   --   --    PROTIME  --   --   --   --  19.5*   APTT  --   --  33.7  --   --          Lab 24  0633 24  0654   SODIUM 142  --    POTASSIUM 4.2  --    CHLORIDE 107  --    CO2 30.0*  --    ANION GAP 5.0  --    BUN 12  --    CREATININE 1.03 1.50*   EGFR 93.0 59.2*    GLUCOSE 83  --    CALCIUM 8.6  --    HEMOGLOBIN A1C 5.70*  --          Lab 01/17/24  0657   ALT (SGPT) 10   AST (SGOT) 14         Lab 01/17/24  0657 01/17/24  0653   HSTROP T 17  --    PROTIME  --  19.5*   INR  --  1.7*         Lab 01/18/24  0633   CHOLESTEROL 54   LDL CHOL 9   HDL CHOL 28*   TRIGLYCERIDES 82             Brief Urine Lab Results  (Last result in the past 365 days)        Color   Clarity   Blood   Leuk Est   Nitrite   Protein   CREAT   Urine HCG        09/13/23 2046 Yellow   Clear   Negative   Negative   Negative   Negative                   Microbiology Results Abnormal       None            Duplex Venous Lower Extremity - Bilateral CAR    Result Date: 1/21/2024    No evidence of deep or superficial venous thrombosis in the right or left lower extremities      Results for orders placed during the hospital encounter of 01/17/24    Adult Transthoracic Echo Complete W/ Cont if Necessary Per Protocol (With Agitated Saline)    Interpretation Summary    Left ventricular ejection fraction appears to be 56 - 60%.    Saline test results are negative.    Estimated right ventricular systolic pressure from tricuspid regurgitation is normal (<35 mmHg). Calculated right ventricular systolic pressure from tricuspid regurgitation is 14 mmHg.      Current medications:  Scheduled Meds:amLODIPine, 5 mg, Oral, Q24H  aspirin, 81 mg, Oral, Daily   Or  aspirin, 300 mg, Rectal, Daily  atorvastatin, 80 mg, Oral, Nightly  clomiPRAMINE, 25 mg, Oral, Nightly  divalproex, 500 mg, Oral, TID  enoxaparin, 40 mg, Subcutaneous, Daily  insulin lispro, 2-7 Units, Subcutaneous, 4x Daily AC & at Bedtime  lisinopril, 20 mg, Oral, Q24H  pregabalin, 25 mg, Oral, Daily  ranolazine, 500 mg, Oral, Q12H  senna-docusate sodium, 2 tablet, Oral, BID  sertraline, 200 mg, Oral, Daily  sodium chloride, 10 mL, Intravenous, Q12H      Continuous Infusions:   PRN Meds:.  acetaminophen    senna-docusate sodium **AND** polyethylene glycol **AND**  bisacodyl **AND** bisacodyl    cyclobenzaprine    dextrose    dextrose    glucagon (human recombinant)    sodium chloride    sodium chloride    Assessment & Plan   Assessment & Plan     Active Hospital Problems    Diagnosis  POA    Primary hypertension [I10]  Yes    Type 2 diabetes mellitus with kidney complication, with long-term current use of insulin [E11.29, Z79.4]  Not Applicable    Bipolar 1 disorder [F31.9]  Yes    Anxiety disorder [F41.9]  Yes    Fall [W19.XXXA]  Yes      Resolved Hospital Problems   No resolved problems to display.        Brief Hospital Course to date:  Olivier Swift is a 42 y/o M with a history of hypertension, diabetes, bipolar disorder, and EULOGIO who is presenting with acute altered mental status following a fall. CT head normal. MRI brain with 10x5 mm focus of FLAIR hyperintensity within the right parietal occipital region. MRI brain with contrast with no pathologic enhancement, area of abnormal signal on MRI brain correspond to area of chronic ischemic changes. Patient was evaluated by neurology and recommended continuing home medications.      Altered mental status  Slurred speech  Fall  -Stroke imaging neg - suspect concussion and post concussive syndrome causing confusion  - Orthostatics negative  - Neurology consulted -- appreciate recs   - Continue home depakote 500 mg TID   - Follow up outpatient neurology as needed   - Evaluated by PT/OT and rehab recommended. Referral to [facility] pending.     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 Dietrich and Dr. Montes as outpt. Suggested he follow up with Dr. Dietrich at d/c. Sounds like he recently failed epidural injection and it may be time for him to consider surgery.  - Stop gabapentin and try lyrica.     Diabetes  ? Peripheral neuropathy  - A1C 5.7  - Continue SSI; resume home medications on DC      HTN  --BP well controlled in the hospital  but now trending up  - Resume amlodipine 5 mg and lisinopril 10 mg daily -- BP  improved      Elevated creatinine on CKD  - held home diuretic/ACE on admission  - improved      Anxiety  Bipolar disorder  --cont home meds    Expected Discharge Location and Transportation:   Expected Discharge   Expected Discharge Date: 1/24/2024; Expected Discharge Time:      DVT prophylaxis:  Medical and mechanical DVT prophylaxis orders are present.         AM-PAC 6 Clicks Score (PT): 17 (01/23/24 7062)    CODE STATUS:   Code Status and Medical Interventions:   Ordered at: 01/17/24 1259     Level Of Support Discussed With:    Patient     Code Status (Patient has no pulse and is not breathing):    CPR (Attempt to Resuscitate)     Medical Interventions (Patient has pulse or is breathing):    Full Support       Ines Tovar II, DO  01/23/24

## 2024-01-23 NOTE — THERAPY TREATMENT NOTE
Patient Name: Olivier Swift  : 1980    MRN: [id number]                              Today's Date: 2024       Admit Date: 2024    Visit Dx:     ICD-10-CM ICD-9-CM   1. Confusion  R41.0 298.9   2. Closed head injury, initial encounter  S09.90XA 959.01   3. Accident due to mechanical fall without injury, initial encounter  W19.XXXA E888.9   4. Aphasia  R47.01 784.3   5. Cognitive communication deficit  R41.841 799.52   6. Dysarthria  R47.1 784.51   7. Impaired functional mobility, balance, gait, and endurance  Z74.09 V49.89     Patient Active Problem List   Diagnosis    Primary hypertension    Type 2 diabetes mellitus with kidney complication, with long-term current use of insulin    Bipolar 1 disorder    Anxiety disorder    Fall     Past Medical History:   Diagnosis Date    Anxiety     Bipolar depression     Depression     Diabetes mellitus     Diabetic amyotrophy associated with type 2 diabetes mellitus     Hypertension     Leaky heart valve     Sleep apnea      History reviewed. No pertinent surgical history.   General Information       Row Name 24 0939          Physical Therapy Time and Intention    Document Type therapy note (daily note)  -CM     Mode of Treatment physical therapy;individual therapy  -CM       Row Name 24 0939          General Information    Patient Profile Reviewed yes  -CM     Existing Precautions/Restrictions fall;seizures  -CM     Barriers to Rehab medically complex;previous functional deficit  -CM       Row Name 2439          Cognition    Orientation Status (Cognition) oriented x 3  -CM       Row Name 2439          Safety Issues, Functional Mobility    Safety Issues Affecting Function (Mobility) awareness of need for assistance;insight into deficits/self-awareness;positioning of assistive device;safety precaution awareness;safety precautions follow-through/compliance;sequencing abilities  -CM     Impairments Affecting Function (Mobility)  balance;strength;sensation/sensory awareness;pain  -CM               User Key  (r) = Recorded By, (t) = Taken By, (c) = Cosigned By      Initials Name Provider Type    Madai Santana, PT Physical Therapist                   Mobility       Row Name 01/23/24 0940          Bed Mobility    Bed Mobility supine-sit  -CM     Supine-Sit [place] (Bed Mobility) contact guard;1 person assist;verbal cues  -CM     Assistive Device (Bed Mobility) bed rails  -CM     Comment, (Bed Mobility) introduced logroll technique to patient due to c/o back pain, cues provided for sequencing with this and patient able to perform without physical assist. Mild c/o dizziness at EOB /99  -CM       Row Name 01/23/24 0940          Sit-Stand Transfer    Sit-Stand [place] (Transfers) moderate assist (50% patient effort);1 person assist;verbal cues  -CM     Assistive Device (Sit-Stand Transfers) walker, front-wheeled  -CM     Comment, (Sit-Stand Transfer) performed x3, initially Sonya from EOB, took seated rest break with ambulation as below and was modA from EOB after that due to fatigue. Cues for safe hand/foot placement  -CM       Row Name 01/23/24 0940          Gait/Stairs (Locomotion)    [place] Level (Gait) minimum assist (75% patient effort);1 person assist;verbal cues  -CM     Assistive Device (Gait) walker, front-wheeled  -CM     Distance in Feet (Gait) 15+15  -CM     Deviations/Abnormal Patterns (Gait) bilateral deviations;base of support, narrow;owen decreased;festinating/shuffling;gait speed decreased;stride length decreased  -CM     Bilateral Gait Deviations heel strike decreased;forward flexed posture  -CM     Comment, (Gait/Stairs) Patient ambulated around bed, took seated rest break due to fatigue, then completed ambulation back around bed to chair. He relies heavily on BUE support on walker, and requires some assist for walker management. Cues provided for decreased weightbearing through BUEs on walker,  upright posture with forward gaze, and increased step length. Patient with good effort to correct gait mechanics but limited by 9/10 back pain and fatigue.  -CM               User Key  (r) = Recorded By, (t) = Taken By, (c) = Cosigned By      Initials Name Provider Type    Madai Santana PT Physical Therapist                   Obj/Interventions       Row Name 01/23/24 0944          Balance    Balance Assessment sitting static balance;standing static balance;standing dynamic balance  -CM     Static Sitting Balance standby assist  -CM     Position, Sitting Balance unsupported;sitting in chair;sitting edge of bed  -CM     Static Standing Balance contact guard  -CM     Dynamic Standing Balance minimal assist  -CM     Position/Device Used, Standing Balance supported;walker, front-wheeled  -CM     Comment, Balance no overt LOB, does require assist for walker management  -CM               User Key  (r) = Recorded By, (t) = Taken By, (c) = Cosigned By      Initials Name Provider Type    Madai Santana PT Physical Therapist                   Goals/Plan    No documentation.                  Clinical Impression       Row Name 01/23/24 0946          Pain    Pretreatment Pain Rating 9/10  -CM     Posttreatment Pain Rating 9/10  -CM     Pain Location generalized  -CM     Pain Location - back  -CM     Pre/Posttreatment Pain Comment patient tolerated well and gave good effort  -CM     Pain Intervention(s) Ambulation/increased activity;Repositioned;Rest;Nursing Notified  -CM       Row Name 01/23/24 0909          Plan of Care Review    Plan of Care Reviewed With patient  -CM     Progress improving  -CM     Outcome Evaluation Patient showing improvement, he required less assistance to ambulate 15'+15' Sonya with FWW. He fatigues very quickly and requires a seated rest break. Also continues to be limited by 9/10 back pain, RN aware. IPPT remains indicated to address current deficits. Continue to recommend D/C to IPR  for best functional outcome.  -CM       Row Name 01/23/24 0946          Vital Signs    Pre Systolic BP Rehab 120  -CM     Pre Treatment Diastolic BP 80  -CM     Intra Systolic BP Rehab 139  -CM     Intra Treatment Diastolic BP 99  -CM     Pretreatment Heart Rate (beats/min) 97  -CM     Intratreatment Heart Rate (beats/min) 115  -CM     Posttreatment Heart Rate (beats/min) 91  -CM     Pre SpO2 (%) 95  -CM     O2 Delivery Pre Treatment room air  -CM     O2 Delivery Intra Treatment room air  -CM     Post SpO2 (%) 98  -CM     O2 Delivery Post Treatment room air  -CM     Pre Patient Position Supine  -CM     Intra Patient Position Standing  -CM     Post Patient Position Sitting  -CM       Row Name 01/23/24 0946          Positioning and Restraints    Pre-Treatment Position in bed  -CM     Post Treatment Position chair  -CM     In Chair reclined;call light within reach;encouraged to call for assist;exit alarm on;waffle cushion;heels elevated;with other staff;notified nsg  -CM               User Key  (r) = Recorded By, (t) = Taken By, (c) = Cosigned By      Initials Name Provider Type    Madai Santana, PT Physical Therapist                   Outcome Measures       Row Name 01/23/24 0948          How much help from another person do you currently need...    Turning from your back to your side while in flat bed without using bedrails? 4  -CM     Moving from lying on back to sitting on the side of a flat bed without bedrails? 3  -CM     Moving to and from a bed to a chair (including a wheelchair)? 3  -CM     Standing up from a chair using your arms (e.g., wheelchair, bedside chair)? 2  -CM     Climbing 3-5 steps with a railing? 2  -CM     To walk in hospital room? 3  -CM     AM-PAC 6 Clicks Score (PT) 17  -CM     Highest Level of Mobility Goal 5 --> Static standing  -CM       Row Name 01/23/24 0948          Functional Assessment    Outcome Measure Options AM-PAC 6 Clicks Basic Mobility (PT)  -CM               User Key   (r) = Recorded By, (t) = Taken By, (c) = Cosigned By      Initials Name Provider Type    Madai Santana, PT Physical Therapist                                 Physical Therapy Education       Title: PT OT SLP Therapies (Done)       Topic: Physical Therapy (Done)       Point: Mobility training (Done)       Learning Progress Summary             Patient Acceptance, E, VU by CM at 1/23/2024 0949    Acceptance, E, VU,NR by CD at 1/21/2024 1138    Comment: SEE FLOWSHEET    Acceptance, E, VU by KR at 1/19/2024 1123                         Point: Body mechanics (Done)       Learning Progress Summary             Patient Acceptance, E, VU by CM at 1/23/2024 0949    Acceptance, E, VU,NR by CD at 1/21/2024 1138    Comment: SEE FLOWSHEET    Acceptance, E, VU by KR at 1/19/2024 1123                         Point: Precautions (Done)       Learning Progress Summary             Patient Acceptance, E, VU by CM at 1/23/2024 0949    Acceptance, E, VU,NR by CD at 1/21/2024 1138    Comment: SEE FLOWSHEET    Acceptance, E, VU by KR at 1/19/2024 1123                                         User Key       Initials Effective Dates Name Provider Type Discipline    CD 02/03/23 -  Abiola Russell PT Physical Therapist PT    KIM 09/22/22 -  Madai Celis, MINA Physical Therapist PT    MINERVA 12/30/22 -  Sallie Askew PT Physical Therapist PT                  PT Recommendation and Plan     Plan of Care Reviewed With: patient  Progress: improving  Outcome Evaluation: Patient showing improvement, he required less assistance to ambulate 15'+15' Sonya with FWW. He fatigues very quickly and requires a seated rest break. Also continues to be limited by 9/10 back pain, RN aware. IPPT remains indicated to address current deficits. Continue to recommend D/C to IPR for best functional outcome.     Time Calculation:         PT Charges       Row Name 01/23/24 0949             Time Calculation    Start Time 0906  -      PT Received On 01/23/24   -CM      PT Goal Re-Cert Due Date 01/28/24  -CM         Timed Charges    95960 - Gait Training Minutes  24  -CM      32446 - PT Therapeutic Activity Minutes 5  -CM         Total Minutes    Timed Charges Total Minutes 29  -CM       Total Minutes 29  -CM                User Key  (r) = Recorded By, (t) = Taken By, (c) = Cosigned By      Initials Name Provider Type    Madai Santana, PT Physical Therapist                  Therapy Charges for Today       Code Description Service Date Service Provider Modifiers Qty    65665522918 HC GAIT TRAINING EA 15 MIN 1/23/2024 Madai Celis, PT GP 2            PT G-Codes  Outcome Measure Options: AM-PAC 6 Clicks Basic Mobility (PT)  AM-PAC 6 Clicks Score (PT): 17  AM-PAC 6 Clicks Score (OT): 20  Modified [place] Scale: 4 - Moderately severe disability.  Unable to walk without assistance, and unable to attend to own bodily needs without assistance.  PT Discharge Summary  Anticipated Discharge Disposition (PT): inpatient rehabilitation facility    Madai Celis PT  1/23/2024

## 2024-01-23 NOTE — PLAN OF CARE
Goal Outcome Evaluation:  Plan of Care Reviewed With: patient        Progress: improving         Anticipated Discharge Disposition (SLP): inpatient rehabilitation facility    SLP Diagnosis: mild, cognitive-linguistic disorder (01/23/24 1000)        Treatment Assessment (SLP): continued, improved (01/23/24 1000)     Plan for Continued Treatment (SLP): continue treatment per plan of care (01/23/24 1000)

## 2024-01-24 LAB
GLUCOSE BLDC GLUCOMTR-MCNC: 106 MG/DL (ref 70–130)
GLUCOSE BLDC GLUCOMTR-MCNC: 142 MG/DL (ref 70–130)
GLUCOSE BLDC GLUCOMTR-MCNC: 76 MG/DL (ref 70–130)
GLUCOSE BLDC GLUCOMTR-MCNC: 85 MG/DL (ref 70–130)

## 2024-01-24 PROCEDURE — 96372 THER/PROPH/DIAG INJ SC/IM: CPT

## 2024-01-24 PROCEDURE — 97116 GAIT TRAINING THERAPY: CPT

## 2024-01-24 PROCEDURE — 93010 ELECTROCARDIOGRAM REPORT: CPT | Performed by: INTERNAL MEDICINE

## 2024-01-24 PROCEDURE — 82948 REAGENT STRIP/BLOOD GLUCOSE: CPT

## 2024-01-24 PROCEDURE — 97110 THERAPEUTIC EXERCISES: CPT

## 2024-01-24 PROCEDURE — 25010000002 ENOXAPARIN PER 10 MG: Performed by: INTERNAL MEDICINE

## 2024-01-24 PROCEDURE — G0378 HOSPITAL OBSERVATION PER HR: HCPCS

## 2024-01-24 PROCEDURE — 99232 SBSQ HOSP IP/OBS MODERATE 35: CPT | Performed by: NURSE PRACTITIONER

## 2024-01-24 PROCEDURE — 93005 ELECTROCARDIOGRAM TRACING: CPT | Performed by: INTERNAL MEDICINE

## 2024-01-24 RX ORDER — METOPROLOL SUCCINATE 25 MG/1
25 TABLET, EXTENDED RELEASE ORAL
Status: DISCONTINUED | OUTPATIENT
Start: 2024-01-24 | End: 2024-01-25 | Stop reason: HOSPADM

## 2024-01-24 RX ADMIN — RANOLAZINE 500 MG: 500 TABLET, EXTENDED RELEASE ORAL at 09:10

## 2024-01-24 RX ADMIN — Medication 10 ML: at 09:11

## 2024-01-24 RX ADMIN — Medication 10 ML: at 21:58

## 2024-01-24 RX ADMIN — PREGABALIN 25 MG: 25 CAPSULE ORAL at 09:10

## 2024-01-24 RX ADMIN — METOPROLOL SUCCINATE 25 MG: 25 TABLET, EXTENDED RELEASE ORAL at 18:27

## 2024-01-24 RX ADMIN — DIVALPROEX SODIUM 500 MG: 250 TABLET, DELAYED RELEASE ORAL at 17:16

## 2024-01-24 RX ADMIN — ENOXAPARIN SODIUM 40 MG: 100 INJECTION SUBCUTANEOUS at 09:10

## 2024-01-24 RX ADMIN — DIVALPROEX SODIUM 500 MG: 250 TABLET, DELAYED RELEASE ORAL at 09:11

## 2024-01-24 RX ADMIN — ATORVASTATIN CALCIUM 80 MG: 40 TABLET, FILM COATED ORAL at 21:57

## 2024-01-24 RX ADMIN — RANOLAZINE 500 MG: 500 TABLET, EXTENDED RELEASE ORAL at 21:57

## 2024-01-24 RX ADMIN — AMLODIPINE BESYLATE 5 MG: 5 TABLET ORAL at 09:10

## 2024-01-24 RX ADMIN — DIVALPROEX SODIUM 500 MG: 250 TABLET, DELAYED RELEASE ORAL at 21:58

## 2024-01-24 RX ADMIN — CLOMIPRAMINE HYDROCHLORIDE 25 MG: 25 CAPSULE ORAL at 21:58

## 2024-01-24 RX ADMIN — ASPIRIN 81 MG: 81 TABLET, CHEWABLE ORAL at 09:10

## 2024-01-24 RX ADMIN — LISINOPRIL 20 MG: 20 TABLET ORAL at 09:10

## 2024-01-24 RX ADMIN — SERTRALINE HYDROCHLORIDE 200 MG: 100 TABLET ORAL at 09:10

## 2024-01-24 NOTE — PLAN OF CARE
Goal Outcome Evaluation:  Plan of Care Reviewed With: patient        Progress: no change  Outcome Evaluation: patient ambulated 30' with min assist x1 and rolling walker for support, patient leans heavily with B UE support on walker due to increased B LE pain. Cues for upright posture. Ambulates at slow, steady pace, unabel to progress mobility due to pain. B LE exercises completed, L LE ROM with LAQ's decreased due to pain. Recommend IRF at D/C for best functional outcome.

## 2024-01-24 NOTE — CASE MANAGEMENT/SOCIAL WORK
Case Management Discharge Note      Final Note: For Thursday. [facility] has insurance approval. Nursing to call report to [phone]. I will arrange transport. I have updated patient.     Patient will need to be in the 1700/maternity entrance on or before 11:30 for the [facility] Wheelchair van shuttle.     Selected Continued Care - Admitted Since 1/17/2024       Destination Coordination complete.      Service Provider Selected Services Address Phone Fax Patient Preferred    [facility] [address] [id number] [phone] --              Durable Medical Equipment    No services have been selected for the patient.                Dialysis/Infusion    No services have been selected for the patient.                Home Medical Care    No services have been selected for the patient.                Therapy    No services have been selected for the patient.                Community Resources    No services have been selected for the patient.                Community & DME    No services have been selected for the patient.                         Final Discharge Disposition Code: 62 - inpatient rehab facility

## 2024-01-24 NOTE — THERAPY TREATMENT NOTE
Patient Name: Olivier Swift  : 1980    MRN: [id number]                              Today's Date: 2024       Admit Date: 2024    Visit Dx:     ICD-10-CM ICD-9-CM   1. Confusion  R41.0 298.9   2. Closed head injury, initial encounter  S09.90XA 959.01   3. Accident due to mechanical fall without injury, initial encounter  W19.XXXA E888.9   4. Aphasia  R47.01 784.3   5. Cognitive communication deficit  R41.841 799.52   6. Dysarthria  R47.1 784.51   7. Impaired functional mobility, balance, gait, and endurance  Z74.09 V49.89     Patient Active Problem List   Diagnosis    Primary hypertension    Type 2 diabetes mellitus with kidney complication, with long-term current use of insulin    Bipolar 1 disorder    Anxiety disorder    Fall     Past Medical History:   Diagnosis Date    Anxiety     Bipolar depression     Depression     Diabetes mellitus     Diabetic amyotrophy associated with type 2 diabetes mellitus     Hypertension     Leaky heart valve     Sleep apnea      History reviewed. No pertinent surgical history.   General Information       Row Name 24 0934          Physical Therapy Time and Intention    Document Type therapy note (daily note)  -AS     Mode of Treatment physical therapy  -AS       Row Name 24 0934          General Information    Patient Profile Reviewed yes  -AS     Existing Precautions/Restrictions fall;seizures  -AS     Barriers to Rehab medically complex  -AS       Row Name 24 0934          Cognition    Orientation Status (Cognition) oriented x 4  -AS       Row Name 24 0934          Safety Issues, Functional Mobility    Safety Issues Affecting Function (Mobility) awareness of need for assistance;insight into deficits/self-awareness;positioning of assistive device;safety precaution awareness;safety precautions follow-through/compliance  -AS     Impairments Affecting Function (Mobility) balance;sensation/sensory awareness;strength;pain  -AS     Comment,  Safety Issues/Impairments (Mobility) alert and following commands, c/o B LE PAIN 7/10 pre tx, 10/10 post tx pre-medicated  -AS               User Key  (r) = Recorded By, (t) = Taken By, (c) = Cosigned By      Initials Name Provider Type    AS Catrachita Negro PTA Physical Therapist Assistant                   Mobility       Row Name 01/24/24 0936          Bed Mobility    Supine-Sit [place] (Bed Mobility) standby assist  -AS     Assistive Device (Bed Mobility) head of bed elevated  -AS     Comment, (Bed Mobility) increased time and effort to reach EOB  -AS       Row Name 01/24/24 0936          Transfers    Comment, (Transfers) cues for hand placement, slow transition into full upright posture due to pain  -AS       Row Name 01/24/24 0936          Bed-Chair Transfer    Bed-Chair [place] (Transfers) verbal cues;minimum assist (75% patient effort);1 person assist  -AS     Assistive Device (Bed-Chair Transfers) walker, front-wheeled  -AS       Row Name 01/24/24 0936          Sit-Stand Transfer    Sit-Stand [place] (Transfers) verbal cues;minimum assist (75% patient effort);1 person assist  -AS     Assistive Device (Sit-Stand Transfers) walker, front-wheeled  -AS     Comment, (Sit-Stand Transfer) cues to reach back and slow controlled movement  -AS       Row Name 01/24/24 0936          Gait/Stairs (Locomotion)    [place] Level (Gait) verbal cues;minimum assist (75% patient effort);1 person assist  -AS     Assistive Device (Gait) walker, front-wheeled  -AS     Distance in Feet (Gait) 30  -AS     Deviations/Abnormal Patterns (Gait) bilateral deviations;base of support, narrow;owen decreased;festinating/shuffling;gait speed decreased;stride length decreased  -AS     Bilateral Gait Deviations heel strike decreased;forward flexed posture  -AS     Comment, (Gait/Stairs) patient ambulated 30' with min assist x1 and rolling walker for support, patient leans heavily with B UE support on walker due to  increased B LE pain. Cues for upright posture. Ambulates at slow, steady pace, unabel to progress mobility due to pain.  -AS               User Key  (r) = Recorded By, (t) = Taken By, (c) = Cosigned By      Initials Name Provider Type    AS Catrachita Negro PTA Physical Therapist Assistant                   Obj/Interventions       Row Name 01/24/24 0941          Motor Skills    Therapeutic Exercise knee;ankle  -AS       Row Name 01/24/24 0941          Knee (Therapeutic Exercise)    Knee (Therapeutic Exercise) strengthening exercise  -AS     Knee Strengthening (Therapeutic Exercise) bilateral;marching while seated;LAQ (long arc quad);sitting;10 repetitions  -AS       [facility] Name 01/24/24 0941          Ankle (Therapeutic Exercise)    Ankle (Therapeutic Exercise) AROM (active range of motion)  -AS     Ankle AROM (Therapeutic Exercise) bilateral;dorsiflexion;plantarflexion;sitting;10 repetitions  -AS       Row Name 01/24/24 0941          Balance    Dynamic Standing Balance verbal cues;minimal assist;1-person assist  -AS     Position/Device Used, Standing Balance supported;walker, front-wheeled  -AS     Comment, Balance min assist for safety  -AS               User Key  (r) = Recorded By, (t) = Taken By, (c) = Cosigned By      Initials Name Provider Type    AS Catrachita Negro PTA Physical Therapist Assistant                   Goals/Plan    No documentation.                  Clinical Impression       Row Name 01/24/24 0941          Pain    Pretreatment Pain Rating 7/10  -AS     Posttreatment Pain Rating 10/10  -AS     Pain Location - Side/Orientation Bilateral  -AS     Pain Location lower  -AS     Pain Location - extremity  -AS     Pre/Posttreatment Pain Comment L LE>R LE at times, equal pain level today  -AS     Pain Intervention(s) Repositioned;Ambulation/increased activity  -AS       Row Name 01/24/24 0941          Plan of Care Review    Plan of Care Reviewed With patient  -AS     Progress no change  -AS      Outcome Evaluation patient ambulated 30' with min assist x1 and rolling walker for support, patient leans heavily with B UE support on walker due to increased B LE pain. Cues for upright posture. Ambulates at slow, steady pace, unabel to progress mobility due to pain. B LE exercises completed, L LE ROM with LAQ's decreased due to pain. Recommend IRF at D/C for best functional outcome.  -AS       Row Name 01/24/24 0941          Positioning and Restraints    Pre-Treatment Position in bed  -AS     Post Treatment Position chair  -AS     In Chair reclined;call light within reach;encouraged to call for assist;exit alarm on;waffle cushion;legs elevated  -AS               User Key  (r) = Recorded By, (t) = Taken By, (c) = Cosigned By      Initials Name Provider Type    AS Catrachita Negro PTA Physical Therapist Assistant                   Outcome Measures       Row Name 01/24/24 0943          How much help from another person do you currently need...    Turning from your back to your side while in flat bed without using bedrails? 4  -AS     Moving from lying on back to sitting on the side of a flat bed without bedrails? 4  -AS     Moving to and from a bed to a chair (including a wheelchair)? 3  -AS     Standing up from a chair using your arms (e.g., wheelchair, bedside chair)? 3  -AS     Climbing 3-5 steps with a railing? 2  -AS     To walk in hospital room? 3  -AS     AM-PAC 6 Clicks Score (PT) 19  -AS     Highest Level of Mobility Goal 6 --> Walk 10 steps or more  -AS       Row Name 01/24/24 0943          Functional Assessment    Outcome Measure Options AM-PAC 6 Clicks Basic Mobility (PT)  -AS               User Key  (r) = Recorded By, (t) = Taken By, (c) = Cosigned By      Initials Name Provider Type    AS Catrachita Negro PTA Physical Therapist Assistant                                 Physical Therapy Education       Title: PT OT SLP Therapies (In Progress)       Topic: Physical Therapy (In Progress)        Point: Mobility training (In Progress)       Learning Progress Summary             Patient Acceptance, E, NR by AS at 1/24/2024 0943    Acceptance, E, VU by CM at 1/23/2024 0949    Acceptance, E, VU,NR by CD at 1/21/2024 1138    Comment: SEE FLOWSHEET    Acceptance, E, VU by KR at 1/19/2024 1123                         Point: Body mechanics (In Progress)       Learning Progress Summary             Patient Acceptance, E, NR by AS at 1/24/2024 0943    Acceptance, E, VU by CM at 1/23/2024 0949    Acceptance, E, VU,NR by CD at 1/21/2024 1138    Comment: SEE FLOWSHEET    Acceptance, E, VU by KR at 1/19/2024 1123                         Point: Precautions (In Progress)       Learning Progress Summary             Patient Acceptance, E, NR by AS at 1/24/2024 0943    Acceptance, E, VU by CM at 1/23/2024 0949    Acceptance, E, VU,NR by CD at 1/21/2024 1138    Comment: SEE FLOWSHEET    Acceptance, E, VU by KR at 1/19/2024 1123                                         User Key       Initials Effective Dates Name Provider Type Discipline    CD 02/03/23 -  Abiola Russell, PT Physical Therapist PT    AS 04/28/23 -  Catrachita Negro, PTA Physical Therapist Assistant PT    CM 09/22/22 -  Madai Celis, PT Physical Therapist PT    KR 12/30/22 -  Sallie Askew, PT Physical Therapist PT                  PT Recommendation and Plan     Plan of Care Reviewed With: patient  Progress: no change  Outcome Evaluation: patient ambulated 30' with min assist x1 and rolling walker for support, patient leans heavily with B UE support on walker due to increased B LE pain. Cues for upright posture. Ambulates at slow, steady pace, unabel to progress mobility due to pain. B LE exercises completed, L LE ROM with LAQ's decreased due to pain. Recommend IRF at D/C for best functional outcome.     Time Calculation:         PT Charges       Row Name 01/24/24 0944             Time Calculation    Start Time 0909  -AS      PT Received On 01/24/24   -AS      PT Goal Re-Cert Due Date 01/28/24  -AS         Timed Charges    55103 - PT Therapeutic Exercise Minutes 10  -AS      34636 - Gait Training Minutes  14  -AS         Total Minutes    Timed Charges Total Minutes 24  -AS       Total Minutes 24  -AS                User Key  (r) = Recorded By, (t) = Taken By, (c) = Cosigned By      Initials Name Provider Type    AS Catrachita Negro PTA Physical Therapist Assistant                  Therapy Charges for Today       Code Description Service Date Service Provider Modifiers Qty    32381571364 HC PT THER PROC EA 15 MIN 1/24/2024 Catrachita Negro PTA GP 1    46637522986 HC GAIT TRAINING EA 15 MIN 1/24/2024 Catrachita Negro, JUAN GP 1            PT G-Codes  Outcome Measure Options: AM-PAC 6 Clicks Basic Mobility (PT)  AM-PAC 6 Clicks Score (PT): 19  AM-PAC 6 Clicks Score (OT): 20  Modified [place] Scale: 4 - Moderately severe disability.  Unable to walk without assistance, and unable to attend to own bodily needs without assistance.       Catrachita Negro PTA  1/24/2024

## 2024-01-24 NOTE — PROGRESS NOTES
[facility] Medicine Services  PROGRESS NOTE    Patient Name: Olivier Swift  : 1980  MRN: [id number]    Date of Admission: 2024  Primary Care Physician: Pastora Carbajal APRN    Subjective   Subjective     CC:  F/u back pain    HPI:  Patient resting in bed.  Says he is still having some numbness and tingling in his left side and in his left leg but that this is not new.  Discussed that he will need to follow-up with Dr. Dietrich at discharge.  Patient states he had diarrhea yesterday but that is slowed down.  Is waiting on decision from [facility] about rehab.      Objective   Objective     Vital Signs:   Temp:  [97.7 °F (36.5 °C)-99.6 °F (37.6 °C)] 97.7 °F (36.5 °C)  Heart Rate:  [65-96] 65  Resp:  [17-20] 17  BP: (115-135)/(82-96) 122/96     Physical Exam:  Constitutional: No acute distress, awake, alert  HENT: NCAT, mucous membranes moist  Respiratory: Clear to auscultation bilaterally, respiratory effort normal, room air  Cardiovascular: RRR, no murmurs, rubs, or gallops  Gastrointestinal: Positive bowel sounds, soft, nontender, nondistended  Musculoskeletal: No bilateral ankle edema  Psychiatric: Appropriate affect, cooperative  Neurologic: Oriented x 3, moves all extremities, Cranial Nerves grossly intact to confrontation, speech clear  Skin: No rashes      Results Reviewed:  LAB RESULTS:      Lab 24  0633   WBC 7.80   HEMOGLOBIN 13.5   HEMATOCRIT 40.7   PLATELETS 161   NEUTROS ABS 3.53   IMMATURE GRANS (ABS) 0.01   LYMPHS ABS 3.46*   MONOS ABS 0.69   EOS ABS 0.07   MCV 89.3         Lab 24  0633   SODIUM 142   POTASSIUM 4.2   CHLORIDE 107   CO2 30.0*   ANION GAP 5.0   BUN 12   CREATININE 1.03   EGFR 93.0   GLUCOSE 83   CALCIUM 8.6   HEMOGLOBIN A1C 5.70*                 Lab 24  0633   CHOLESTEROL 54   LDL CHOL 9   HDL CHOL 28*   TRIGLYCERIDES 82             Brief Urine Lab Results  (Last result in the past 365 days)        Color   Clarity   Blood   Leuk  Est   Nitrite   Protein   CREAT   Urine HCG        09/13/23 2046 Yellow   Clear   Negative   Negative   Negative   Negative                   Microbiology Results Abnormal       None            No radiology results from the last 24 hrs    Results for orders placed during the hospital encounter of 01/17/24    Adult Transthoracic Echo Complete W/ Cont if Necessary Per Protocol (With Agitated Saline)    Interpretation Summary    Left ventricular ejection fraction appears to be 56 - 60%.    Saline test results are negative.    Estimated right ventricular systolic pressure from tricuspid regurgitation is normal (<35 mmHg). Calculated right ventricular systolic pressure from tricuspid regurgitation is 14 mmHg.      Current medications:  Scheduled Meds:amLODIPine, 5 mg, Oral, Q24H  aspirin, 81 mg, Oral, Daily   Or  aspirin, 300 mg, Rectal, Daily  atorvastatin, 80 mg, Oral, Nightly  clomiPRAMINE, 25 mg, Oral, Nightly  divalproex, 500 mg, Oral, TID  enoxaparin, 40 mg, Subcutaneous, Daily  insulin lispro, 2-7 Units, Subcutaneous, 4x Daily AC & at Bedtime  lisinopril, 20 mg, Oral, Q24H  pregabalin, 25 mg, Oral, Daily  ranolazine, 500 mg, Oral, Q12H  senna-docusate sodium, 2 tablet, Oral, BID  sertraline, 200 mg, Oral, Daily  sodium chloride, 10 mL, Intravenous, Q12H      Continuous Infusions:   PRN Meds:.  acetaminophen    senna-docusate sodium **AND** polyethylene glycol **AND** bisacodyl **AND** bisacodyl    cyclobenzaprine    dextrose    dextrose    glucagon (human recombinant)    sodium chloride    sodium chloride    Assessment & Plan   Assessment & Plan     Active Hospital Problems    Diagnosis  POA    Primary hypertension [I10]  Yes    Type 2 diabetes mellitus with kidney complication, with long-term current use of insulin [E11.29, Z79.4]  Not Applicable    Bipolar 1 disorder [F31.9]  Yes    Anxiety disorder [F41.9]  Yes    Fall [W19.XXXA]  Yes      Resolved Hospital Problems   No resolved problems to display.         Brief Hospital Course to date:  Olivier Swift is a 43 y.o. male with a history of hypertension, diabetes, bipolar disorder, and EULOGIO who presented with acute altered mental status following a fall. CT head normal. MRI brain with 10x5 mm focus of FLAIR hyperintensity within the right parietal occipital region. MRI brain with contrast with no pathologic enhancement, area of abnormal signal on MRI brain corresponds to area of chronic ischemic changes. Patient was evaluated by neurology and recommended continuing home medications.      This patient's problems and plans were partially entered by my partner and updated as appropriate by me 01/24/24.      Altered mental status  Slurred speech  Fall  -Stroke imaging neg - suspect concussion and post-concussive syndrome causing confusion  - Orthostatics negative  - Neurology consulted, appreciate recs   - Continue home depakote 500 mg TID   - Follow up outpatient neurology as needed   - Evaluated by PT/OT and rehab recommended. Referral to [facility] pending  - AM BMP, CBC, mag     Chronic lumbar DDD w/ left sided sciatica  - Followed by Dr. Dietrich and Dr. Montes as outpt. Recommend follow-up with Dr. Dietrich at d/c. Sounds like he recently failed epidural injection and it may be time for him to consider surgery.  - Trial of Lyrica, gabapentin dc'd     Diabetes  ? Peripheral neuropathy  - A1C 5.7  - Continue SSI  - has not required any insulin this admission  - May need some modifications to home antihyperglycemics at [place]. Concern that his BG may be running too low, which may contribute to falls. He is on Levemir, Ozempic, and metformin per med rec. Recommend holding Levemir until seen by PCP, checking BG 4x daily and taking readings to PCP follow-up.      HTN  --BP initially well controlled in the hospital but has trended up  --per med rec, on amlodipine 5 mg daily, lisinopril 40 mg daily, metoprolol XL 25 mg daily, HCTZ 25 mg daily, prazosin 2 mg daily  --Resumed amlodipine 5  mg and lisinopril 20 mg daily  -- BP has improved   --recommend BP monitoring x 1 week, may need titration of some home meds to avoid hypotension given recent falls     Elevated creatinine on CKD, resolved  - held home diuretic/ACE on admission     Anxiety  Bipolar disorder  --cont home meds      Expected Discharge Location and Transportation: [place]  Expected Discharge pending bed offer, insurance approval  Expected Discharge Date: 1/24/2024; Expected Discharge Time:      DVT prophylaxis:  Medical and mechanical DVT prophylaxis orders are present.         AM-PAC 6 Clicks Score (PT): 17 (01/23/24 2000)    CODE STATUS:   Code Status and Medical Interventions:   Ordered at: 01/17/24 1259     Level Of Support Discussed With:    Patient     Code Status (Patient has no pulse and is not breathing):    CPR (Attempt to Resuscitate)     Medical Interventions (Patient has pulse or is breathing):    Full Support       Rachel Garcia, APRN  01/24/24

## 2024-01-25 VITALS
DIASTOLIC BLOOD PRESSURE: 82 MMHG | RESPIRATION RATE: 18 BRPM | SYSTOLIC BLOOD PRESSURE: 115 MMHG | TEMPERATURE: 98.1 F | OXYGEN SATURATION: 98 % | WEIGHT: 235.89 LBS | BODY MASS INDEX: 39.3 KG/M2 | HEART RATE: 71 BPM | HEIGHT: 65 IN
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
## 2024-01-25 LAB
ANION GAP SERPL CALCULATED.3IONS-SCNC: 11 MMOL/L (ref 5–15)
BUN SERPL-MCNC: 11 MG/DL (ref 6–20)
BUN/CREAT SERPL: 10.7 (ref 7–25)
CALCIUM SPEC-SCNC: 8.6 MG/DL (ref 8.6–10.5)
CHLORIDE SERPL-SCNC: 104 MMOL/L (ref 98–107)
CO2 SERPL-SCNC: 26 MMOL/L (ref 22–29)
CREAT SERPL-MCNC: 1.03 MG/DL (ref 0.76–1.27)
DEPRECATED RDW RBC AUTO: 48.8 FL (ref 37–54)
EGFRCR SERPLBLD CKD-EPI 2021: 93 ML/MIN/1.73
ERYTHROCYTE [DISTWIDTH] IN BLOOD BY AUTOMATED COUNT: 14.5 % (ref 12.3–15.4)
GLUCOSE BLDC GLUCOMTR-MCNC: 100 MG/DL (ref 70–130)
GLUCOSE BLDC GLUCOMTR-MCNC: 82 MG/DL (ref 70–130)
GLUCOSE SERPL-MCNC: 104 MG/DL (ref 65–99)
HCT VFR BLD AUTO: 43.3 % (ref 37.5–51)
HGB BLD-MCNC: 14.3 G/DL (ref 13–17.7)
MAGNESIUM SERPL-MCNC: 2.1 MG/DL (ref 1.6–2.6)
MCH RBC QN AUTO: 30.4 PG (ref 26.6–33)
MCHC RBC AUTO-ENTMCNC: 33 G/DL (ref 31.5–35.7)
MCV RBC AUTO: 91.9 FL (ref 79–97)
PLATELET # BLD AUTO: 173 10*3/MM3 (ref 140–450)
PMV BLD AUTO: 13.9 FL (ref 6–12)
POTASSIUM SERPL-SCNC: 4.3 MMOL/L (ref 3.5–5.2)
QT INTERVAL: 394 MS
QTC INTERVAL: 484 MS
RBC # BLD AUTO: 4.71 10*6/MM3 (ref 4.14–5.8)
SODIUM SERPL-SCNC: 141 MMOL/L (ref 136–145)
WBC NRBC COR # BLD AUTO: 8.52 10*3/MM3 (ref 3.4–10.8)

## 2024-01-25 PROCEDURE — G0378 HOSPITAL OBSERVATION PER HR: HCPCS

## 2024-01-25 PROCEDURE — 96372 THER/PROPH/DIAG INJ SC/IM: CPT

## 2024-01-25 PROCEDURE — 82948 REAGENT STRIP/BLOOD GLUCOSE: CPT

## 2024-01-25 PROCEDURE — 99239 HOSP IP/OBS DSCHRG MGMT >30: CPT | Performed by: NURSE PRACTITIONER

## 2024-01-25 PROCEDURE — 80048 BASIC METABOLIC PNL TOTAL CA: CPT | Performed by: NURSE PRACTITIONER

## 2024-01-25 PROCEDURE — 25010000002 ENOXAPARIN PER 10 MG: Performed by: INTERNAL MEDICINE

## 2024-01-25 PROCEDURE — 83735 ASSAY OF MAGNESIUM: CPT | Performed by: NURSE PRACTITIONER

## 2024-01-25 PROCEDURE — 85027 COMPLETE CBC AUTOMATED: CPT | Performed by: NURSE PRACTITIONER

## 2024-01-25 RX ORDER — INSULIN DETEMIR 100 [IU]/ML
10 INJECTION, SOLUTION SUBCUTANEOUS DAILY
Start: 2024-01-25

## 2024-01-25 RX ORDER — LISINOPRIL 20 MG/1
20 TABLET ORAL
Start: 2024-01-26

## 2024-01-25 RX ORDER — LISINOPRIL 20 MG/1
20 TABLET ORAL
Start: 2024-01-26 | End: 2024-01-25 | Stop reason: SDUPTHER

## 2024-01-25 RX ORDER — PREGABALIN 25 MG/1
25 CAPSULE ORAL DAILY
Start: 2024-01-25

## 2024-01-25 RX ORDER — ASPIRIN 81 MG/1
81 TABLET, CHEWABLE ORAL DAILY
Start: 2024-01-25

## 2024-01-25 RX ADMIN — PREGABALIN 25 MG: 25 CAPSULE ORAL at 09:08

## 2024-01-25 RX ADMIN — METOPROLOL SUCCINATE 25 MG: 25 TABLET, EXTENDED RELEASE ORAL at 09:08

## 2024-01-25 RX ADMIN — ASPIRIN 81 MG: 81 TABLET, CHEWABLE ORAL at 09:08

## 2024-01-25 RX ADMIN — LISINOPRIL 20 MG: 20 TABLET ORAL at 09:07

## 2024-01-25 RX ADMIN — RANOLAZINE 500 MG: 500 TABLET, EXTENDED RELEASE ORAL at 09:08

## 2024-01-25 RX ADMIN — SENNOSIDES AND DOCUSATE SODIUM 2 TABLET: 8.6; 5 TABLET ORAL at 09:08

## 2024-01-25 RX ADMIN — AMLODIPINE BESYLATE 5 MG: 5 TABLET ORAL at 09:08

## 2024-01-25 RX ADMIN — DIVALPROEX SODIUM 500 MG: 250 TABLET, DELAYED RELEASE ORAL at 09:08

## 2024-01-25 RX ADMIN — ENOXAPARIN SODIUM 40 MG: 100 INJECTION SUBCUTANEOUS at 09:07

## 2024-01-25 RX ADMIN — SERTRALINE HYDROCHLORIDE 200 MG: 100 TABLET ORAL at 09:08

## 2024-01-25 NOTE — PLAN OF CARE
Goal Outcome Evaluation:   Patient alert and oriented x4. NSR. NIH 6. Patient ambulating to bathroom with standby assist with walker and gait belt. Sat up in chair this morning for a few hours and tolerated well. Had a 3-4 minute episode of chest pain with tachycardia this evening. EKG obtained. Normal sinus rhythm. APRN notified. Started back home dose of metoprolol.

## 2024-01-25 NOTE — DISCHARGE SUMMARY
Psychiatric Medicine Services  DISCHARGE SUMMARY    Patient Name: Olivier Swift  : 1980  MRN: [id number]    Date of Admission: 2024  6:39 AM  Date of Discharge:  2024  Primary Care Physician: Pastora Carbajal APRN    Consults       Date and Time Order Name Status Description    2024  9:04 AM Inpatient Neurology Consult General Completed     2024  6:46 AM Inpatient Neurology Consult Stroke Completed             Hospital Course     Presenting Problem: Altered mental status, fall    Active Hospital Problems    Diagnosis  POA    Primary hypertension [I10]  Yes    Type 2 diabetes mellitus with kidney complication, with long-term current use of insulin [E11.29, Z79.4]  Not Applicable    Bipolar 1 disorder [F31.9]  Yes    Anxiety disorder [F41.9]  Yes    Fall [W19.XXXA]  Yes      Resolved Hospital Problems   No resolved problems to display.          Hospital Course:  Olivier Swift is a 43 y.o. male with a history of hypertension, diabetes, bipolar disorder, and EULOGIO who presented with acute altered mental status following a fall at home. Per wife, patient has had several falls at home related to his diabetes. NIH on admission was 5, and neuro-stroke team was consulted. CT head was unremarkable. MRI imaging was also negative for stroke, but there was suspicion that patient may have had a concussion from the fall and had some post-concussive syndrome causing his confusion.     There was concern based on the description of his fall --pt noted spots in his vision and tunnel vision prior to falls -- for possible seizure activity, so general neurology was also consulted. EEG, however, was normal. Patient also has know neuropathy d/t bulging discs and has been followed by Dr. Dietrich with neurosurgery and with Dr. Montes with pain management.       Custom medications with patient and with his wife via phone prior to discharge.  Will plan to discharge patient on blood  pressure medicines that he has been receiving while admitted and on reduced number of antihyperglycemics as there is concern patient's numerous medications may be contributing to his weakness and falls.  Patient will need close follow-up with PCP and cardiology for adjustment of these medications.    Altered mental status  Slurred speech  Fall  -Stroke imaging neg - suspect concussion and post-concussive syndrome causing confusion  - Orthostatics negative  - Continue home depakote 500 mg TID   - Follow up outpatient neurology as needed   - PT and OT followed. Plan is [facility] for rehab.     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 Dietrich and Dr. Montes as outpt. Recommend follow-up with Dr. Dietrich at d/c. Sounds like he recently failed epidural injection and it may be time for him to consider surgery.  - Trial of Lyrica started 1/23, continue at discharge, titrate as indicated     Diabetes  ? Peripheral neuropathy  - A1C 5.7  - has not required any insulin this admission  - May need some modifications to home antihyperglycemics at [place]. Concern that his BG may be running too low, which may contribute to falls. He is on Levemir, Ozempic, and metformin per med rec. Recommend reducing Levemir to 10 units daily, continuing metformin 500 mg bid, and holding Ozempic until seen by PCP. Patient instructed to check BG 4x daily and taking readings to PCP follow-up.      HTN  --BP initially well controlled in the hospital but then trended up  --Per med rec, on amlodipine 5 mg daily, lisinopril 40 mg daily, metoprolol XL 25 mg daily, HCTZ 25 mg daily, prazosin 2 mg daily  --Resumed amlodipine 5 mg and lisinopril 20 mg daily  --Resumed home metoprolol given some tachycardia  --Continue BP medications that patient has been getting in the hospital until seen by outpatient cardiologist.   --Patient instructed to monitor BP x 1 week and take readings to follow up with PCP and cardiology. May need titration of  some home meds to avoid hypotension given recent falls     Elevated creatinine on CKD, resolved  - held home diuretic/ACE on admission  -- Avoid NSAIDs     Anxiety  Bipolar disorder  --Continue home meds     Discharge Follow Up Recommendations for outpatient labs/diagnostics:  --Follow up with PCP one week after discharge from [facility]  --Follow up with cardiologist, Dr. Hatch, 1 month  --Follow up with Dr. Dietrich with neurosurgery 1 month  --BP and BG monitoring x 1 week after discharge from [facility]      Day of Discharge     HPI:   Patient resting in bed. Says pain is manageable. Denies other concerns.    Review of Systems  Gen- No fevers, chills  CV- No chest pain, palpitations  Resp- No cough, dyspnea  GI- No N/V/D, abd pain      Vital Signs:   Temp:  [98 °F (36.7 °C)-98.4 °F (36.9 °C)] 98.1 °F (36.7 °C)  Heart Rate:  [] 71  Resp:  [18-20] 18  BP: (111-140)/(74-95) 115/82      Physical Exam:  Constitutional: No acute distress, awake, alert  HENT: NCAT, mucous membranes moist  Respiratory: Clear to auscultation bilaterally, respiratory effort normal, room air  Cardiovascular: RRR, no murmurs, rubs, or gallops  Gastrointestinal: Positive bowel sounds, soft, nontender, nondistended  Musculoskeletal: No bilateral ankle edema  Psychiatric: Appropriate affect, cooperative  Neurologic: Oriented x 3, moves all extremities, Cranial Nerves grossly intact to confrontation, speech clear  Skin: No rashes    Pertinent  and/or Most Recent Results     LAB RESULTS:        Lab 01/25/24  0355   SODIUM 141   POTASSIUM 4.3   CHLORIDE 104   CO2 26.0   ANION GAP 11.0   BUN 11   CREATININE 1.03   EGFR 93.0   GLUCOSE 104*   CALCIUM 8.6   MAGNESIUM 2.1                         Brief Urine Lab Results  (Last result in the past 365 days)        Color   Clarity   Blood   Leuk Est   Nitrite   Protein   CREAT   Urine HCG        09/13/23 2046 Yellow   Clear   Negative   Negative   Negative   Negative                 Microbiology  Results (last 10 days)       ** No results found for the last 240 hours. **            Duplex Venous Lower Extremity - Bilateral CAR    Result Date: 1/21/2024    No evidence of deep or superficial venous thrombosis in the right or left lower extremities     MRI Brain With Contrast    Result Date: 1/18/2024  MRI BRAIN W CONTRAST Date of Exam: 1/18/2024 8:55 PM EST Indication: left sided tingling; follow up FLAIR changes in right parietal lobe seen on noncontrast MRI.  Comparison: Noncontrast MRI brain 1/17/2024 Technique:  Routine multiplanar/multisequence sequence images of the brain were obtained after the uneventful administration of 20 cc Multihance.  Findings: Within the periventricular white matter of the right parietal lobe there is a 10 x 5 mm area of decreased T1 signal. This corresponds to the area of abnormal increased FLAIR signal on prior exam. There is no associated pathologic contrast enhancement and  this would be most consistent with an area of chronic small vessel ischemic disease. There is no pathologic contrast enhancement within the brain or cerebellum. Globes and orbits are within normal limits. Visualized paranasal sinuses appear clear. Dural venous sinuses are patent. Sella and suprasellar cistern appear within normal limits. Craniovertebral junction is within normal limits.     Impression: 1. No pathologic intracranial contrast enhancement. 2. Previously demonstrated area of abnormal increased T2 signal within the periventricular white matter of the right parietal lobe corresponds to an area of chronic ischemic change. Electronically Signed: Gregorio Long MD  1/18/2024 9:34 PM EST  Workstation ID: [id number]    Adult Transthoracic Echo Complete W/ Cont if Necessary Per Protocol (With Agitated Saline)    Result Date: 1/17/2024    Left ventricular ejection fraction appears to be 56 - 60%.   Saline test results are negative.   Estimated right ventricular systolic pressure from tricuspid  regurgitation is normal (<35 mmHg). Calculated right ventricular systolic pressure from tricuspid regurgitation is 14 mmHg.     MRI Brain Without Contrast    Addendum Date: 1/17/2024    ADDENDUM #1 The following should be included in the FINDINGS section: The common carotid, internal carotid, middle cerebral, anterior cerebral, posterior cerebral, basilar, and vertebral arteries demonstrate normal flow signal without abrupt cut off or significant stenosis. No intracranial aneurysm is seen. Electronically Signed: Felix Nelson MD  1/17/2024 6:17 PM EST  Workstation ID: [id number] ORIGINAL REPORT: MRI BRAIN WO CONTRAST, MRI ANGIOGRAM HEAD WO CONTRAST, MRI ANGIOGRAM NECK WO CONTRAST Date of Exam: 1/17/2024 10:49 AM EST Indication: Neuro deficit, acute, stroke suspected.  Comparison: None available. Technique:  Routine multiplanar/multisequence sequence images of the brain were obtained without contrast administration.  Routine 3-D time-of-flight gradient echo imaging was obtained of the head without contrast administration. Routine 3-D time-of-flight gradient echo imaging was obtained of the neck without contrast administration. FINDINGS: 10 x 5 mm focus of FLAIR signal hyperintensity within the right parietal occipital region (series 9, image 18). Midline structures appear unremarkable. No significant mass effect, intracranial hemorrhage, or hydrocephalus is identified. Diffusion-weighted sequences demonstrate no acute infarct.The visualized intracranial flow-voids appear unremarkable. The paranasal sinuses and mastoid air cells show no fluid signal. The orbits, globes, retrobulbar soft tissues appear unremarkable. The visualized superficial soft tissues and cervical spine demonstrate no significant abnormality. IMPRESSION: 1.  10 x 5 mm focus of FLAIR signal hyperintensity within the right parietal occipital region (series 9, image 18). This finding is nonspecific and may be related to chronic microvascular ischemic  change, postinfectious/postinflammatory states, demyelinating conditions, as well as other etiologies. If this location corresponds to patient's acute symptomatology, consider further evaluation with contrast-enhanced MRI of the brain. 2.No acute abnormality identified within the large arteries of the head or neck. 3.No significant stenosis of the bilateral internal carotid arteries. Electronically Signed: Felix Nelson MD  1/17/2024 12:12 PM EST  Workstation ID: [id number]    Result Date: 1/17/2024  MRI BRAIN WO CONTRAST, MRI ANGIOGRAM HEAD WO CONTRAST, MRI ANGIOGRAM NECK WO CONTRAST Date of Exam: 1/17/2024 10:49 AM EST Indication: Neuro deficit, acute, stroke suspected.  Comparison: None available. Technique:  Routine multiplanar/multisequence sequence images of the brain were obtained without contrast administration.  Routine 3-D time-of-flight gradient echo imaging was obtained of the head without contrast administration. Routine 3-D time-of-flight gradient echo imaging was obtained of the neck without contrast administration. FINDINGS: 10 x 5 mm focus of FLAIR signal hyperintensity within the right parietal occipital region (series 9, image 18). Midline structures appear unremarkable. No significant mass effect, intracranial hemorrhage, or hydrocephalus is identified. Diffusion-weighted sequences demonstrate no acute infarct.The visualized intracranial flow-voids appear unremarkable. The paranasal sinuses and mastoid air cells show no fluid signal. The orbits, globes, retrobulbar soft tissues appear unremarkable. The visualized superficial soft tissues and cervical spine demonstrate no significant abnormality.     1.  10 x 5 mm focus of FLAIR signal hyperintensity within the right parietal occipital region (series 9, image 18). This finding is nonspecific and may be related to chronic microvascular ischemic change, postinfectious/postinflammatory states, demyelinating conditions, as well as other etiologies. If  this location corresponds to patient's acute symptomatology, consider further evaluation with contrast-enhanced MRI of the brain. 2.No acute abnormality identified within the large arteries of the head or neck. 3.No significant stenosis of the bilateral internal carotid arteries. Electronically Signed: Felix Nelson MD  1/17/2024 12:12 PM EST  Workstation ID: [id number]    MRI Angiogram Head Without Contrast    Addendum Date: 1/17/2024    ADDENDUM #1 The following should be included in the FINDINGS section: The common carotid, internal carotid, middle cerebral, anterior cerebral, posterior cerebral, basilar, and vertebral arteries demonstrate normal flow signal without abrupt cut off or significant stenosis. No intracranial aneurysm is seen. Electronically Signed: Felix Nelson MD  1/17/2024 6:17 PM EST  Workstation ID: [id number] ORIGINAL REPORT: MRI BRAIN WO CONTRAST, MRI ANGIOGRAM HEAD WO CONTRAST, MRI ANGIOGRAM NECK WO CONTRAST Date of Exam: 1/17/2024 10:49 AM EST Indication: Neuro deficit, acute, stroke suspected.  Comparison: None available. Technique:  Routine multiplanar/multisequence sequence images of the brain were obtained without contrast administration.  Routine 3-D time-of-flight gradient echo imaging was obtained of the head without contrast administration. Routine 3-D time-of-flight gradient echo imaging was obtained of the neck without contrast administration. FINDINGS: 10 x 5 mm focus of FLAIR signal hyperintensity within the right parietal occipital region (series 9, image 18). Midline structures appear unremarkable. No significant mass effect, intracranial hemorrhage, or hydrocephalus is identified. Diffusion-weighted sequences demonstrate no acute infarct.The visualized intracranial flow-voids appear unremarkable. The paranasal sinuses and mastoid air cells show no fluid signal. The orbits, globes, retrobulbar soft tissues appear unremarkable. The visualized superficial soft tissues and  cervical spine demonstrate no significant abnormality. IMPRESSION: 1.  10 x 5 mm focus of FLAIR signal hyperintensity within the right parietal occipital region (series 9, image 18). This finding is nonspecific and may be related to chronic microvascular ischemic change, postinfectious/postinflammatory states, demyelinating conditions, as well as other etiologies. If this location corresponds to patient's acute symptomatology, consider further evaluation with contrast-enhanced MRI of the brain. 2.No acute abnormality identified within the large arteries of the head or neck. 3.No significant stenosis of the bilateral internal carotid arteries. Electronically Signed: Felix Nelson MD  1/17/2024 12:12 PM EST  Workstation ID: [id number]    Result Date: 1/17/2024  MRI BRAIN WO CONTRAST, MRI ANGIOGRAM HEAD WO CONTRAST, MRI ANGIOGRAM NECK WO CONTRAST Date of Exam: 1/17/2024 10:49 AM EST Indication: Neuro deficit, acute, stroke suspected.  Comparison: None available. Technique:  Routine multiplanar/multisequence sequence images of the brain were obtained without contrast administration.  Routine 3-D time-of-flight gradient echo imaging was obtained of the head without contrast administration. Routine 3-D time-of-flight gradient echo imaging was obtained of the neck without contrast administration. FINDINGS: 10 x 5 mm focus of FLAIR signal hyperintensity within the right parietal occipital region (series 9, image 18). Midline structures appear unremarkable. No significant mass effect, intracranial hemorrhage, or hydrocephalus is identified. Diffusion-weighted sequences demonstrate no acute infarct.The visualized intracranial flow-voids appear unremarkable. The paranasal sinuses and mastoid air cells show no fluid signal. The orbits, globes, retrobulbar soft tissues appear unremarkable. The visualized superficial soft tissues and cervical spine demonstrate no significant abnormality.     1.  10 x 5 mm focus of FLAIR signal  hyperintensity within the right parietal occipital region (series 9, image 18). This finding is nonspecific and may be related to chronic microvascular ischemic change, postinfectious/postinflammatory states, demyelinating conditions, as well as other etiologies. If this location corresponds to patient's acute symptomatology, consider further evaluation with contrast-enhanced MRI of the brain. 2.No acute abnormality identified within the large arteries of the head or neck. 3.No significant stenosis of the bilateral internal carotid arteries. Electronically Signed: Felix Nelson MD  1/17/2024 12:12 PM EST  Workstation ID: [id number]    MRI Angiogram Neck Without Contrast    Addendum Date: 1/17/2024    ADDENDUM #1 The following should be included in the FINDINGS section: The common carotid, internal carotid, middle cerebral, anterior cerebral, posterior cerebral, basilar, and vertebral arteries demonstrate normal flow signal without abrupt cut off or significant stenosis. No intracranial aneurysm is seen. Electronically Signed: Felix Nelson MD  1/17/2024 6:17 PM EST  Workstation ID: [id number] ORIGINAL REPORT: MRI BRAIN WO CONTRAST, MRI ANGIOGRAM HEAD WO CONTRAST, MRI ANGIOGRAM NECK WO CONTRAST Date of Exam: 1/17/2024 10:49 AM EST Indication: Neuro deficit, acute, stroke suspected.  Comparison: None available. Technique:  Routine multiplanar/multisequence sequence images of the brain were obtained without contrast administration.  Routine 3-D time-of-flight gradient echo imaging was obtained of the head without contrast administration. Routine 3-D time-of-flight gradient echo imaging was obtained of the neck without contrast administration. FINDINGS: 10 x 5 mm focus of FLAIR signal hyperintensity within the right parietal occipital region (series 9, image 18). Midline structures appear unremarkable. No significant mass effect, intracranial hemorrhage, or hydrocephalus is identified. Diffusion-weighted sequences  demonstrate no acute infarct.The visualized intracranial flow-voids appear unremarkable. The paranasal sinuses and mastoid air cells show no fluid signal. The orbits, globes, retrobulbar soft tissues appear unremarkable. The visualized superficial soft tissues and cervical spine demonstrate no significant abnormality. IMPRESSION: 1.  10 x 5 mm focus of FLAIR signal hyperintensity within the right parietal occipital region (series 9, image 18). This finding is nonspecific and may be related to chronic microvascular ischemic change, postinfectious/postinflammatory states, demyelinating conditions, as well as other etiologies. If this location corresponds to patient's acute symptomatology, consider further evaluation with contrast-enhanced MRI of the brain. 2.No acute abnormality identified within the large arteries of the head or neck. 3.No significant stenosis of the bilateral internal carotid arteries. Electronically Signed: Felix Nelson MD  1/17/2024 12:12 PM EST  Workstation ID: [id number]    Result Date: 1/17/2024  MRI BRAIN WO CONTRAST, MRI ANGIOGRAM HEAD WO CONTRAST, MRI ANGIOGRAM NECK WO CONTRAST Date of Exam: 1/17/2024 10:49 AM EST Indication: Neuro deficit, acute, stroke suspected.  Comparison: None available. Technique:  Routine multiplanar/multisequence sequence images of the brain were obtained without contrast administration.  Routine 3-D time-of-flight gradient echo imaging was obtained of the head without contrast administration. Routine 3-D time-of-flight gradient echo imaging was obtained of the neck without contrast administration. FINDINGS: 10 x 5 mm focus of FLAIR signal hyperintensity within the right parietal occipital region (series 9, image 18). Midline structures appear unremarkable. No significant mass effect, intracranial hemorrhage, or hydrocephalus is identified. Diffusion-weighted sequences demonstrate no acute infarct.The visualized intracranial flow-voids appear unremarkable. The  paranasal sinuses and mastoid air cells show no fluid signal. The orbits, globes, retrobulbar soft tissues appear unremarkable. The visualized superficial soft tissues and cervical spine demonstrate no significant abnormality.     1.  10 x 5 mm focus of FLAIR signal hyperintensity within the right parietal occipital region (series 9, image 18). This finding is nonspecific and may be related to chronic microvascular ischemic change, postinfectious/postinflammatory states, demyelinating conditions, as well as other etiologies. If this location corresponds to patient's acute symptomatology, consider further evaluation with contrast-enhanced MRI of the brain. 2.No acute abnormality identified within the large arteries of the head or neck. 3.No significant stenosis of the bilateral internal carotid arteries. Electronically Signed: Felix Nelson MD  1/17/2024 12:12 PM EST  Workstation ID: [id number]    EEG    Result Date: 1/17/2024  Reason for referral: 43 y.o.male with altered mental status, speech changes, consideration of seizure Technical Summary:  A 19 channel digital EEG was performed using the international 10-20 placement system, including eye leads and EKG leads. Duration: 21 minutes Findings: The patient is awake.  The background shows diffuse medium amplitude intermixed theta and alpha activity present symmetrically over both hemispheres.  A well-regulated 9 Hz posterior rhythm is evident symmetrically over the occipital and posterior parietal leads.  Low amplitude EMG artifact is noted anteriorly.  Drowsiness and light sleep are seen with mild slowing of the background.  No focal features or epileptiform activity are seen.  Photic stimulation does not change the background.  Hyperventilation is not performed. Video: Available Technical quality: Superior EKG: Regular, 70 bpm SUMMARY: Normal EEG in the awake and lightly asleep states No focal features or epileptiform activity are seen     Normal study This  report is transcribed using the Dragon dictation system.      XR Chest 1 View    Result Date: 1/17/2024  XR CHEST 1 VW Date of Exam: 1/17/2024 7:11 AM EST Indication: Acute Stroke Protocol (onset < 12 hrs) Comparison: Chest radiograph 7/1/2022. Findings: Cardiomediastinal silhouette is within normal limits. No focal consolidation or overt pulmonary edema. No pleural effusion or pneumothorax. Osseous structures are unremarkable.     Impression: No evidence of acute cardiopulmonary disease. Electronically Signed: Jordan Castro MD  1/17/2024 7:23 AM EST  Workstation ID: [id number]    CT Head Without Contrast Stroke Protocol    Result Date: 1/17/2024  CT HEAD WO CONTRAST STROKE PROTOCOL Date of Exam: 1/17/2024 6:46 AM EST Indication: Neuro deficit, acute, stroke suspected Neuro deficit, acute stroke suspected. Comparison: CT scan of the head dated July 1, 2022 Technique: Axial CT images were obtained of the head without contrast administration.  Reconstructed coronal images were also obtained. Automated exposure control and iterative construction methods were used. Scan Time: 6:47 a.m. Results discussed with stroke  [place] at 6:52 a.m. Findings: There is no evidence of hemorrhage. There is no mass effect or midline shift. There is no extracerebral collection. Ventricles are normal in size and configuration for patient's stated age.  Posterior fossa is within normal limits. Calvarium and skull base appear intact.   Visualized sinuses show no air fluid levels. Visualized orbits are unremarkable.     Impression: Normal Electronically Signed: Ar Fitch MD  1/17/2024 6:53 AM EST  Workstation ID: [id number]     Results for orders placed during the hospital encounter of 01/17/24    Duplex Venous Lower Extremity - Bilateral CAR    Interpretation Summary    No evidence of deep or superficial venous thrombosis in the right or left lower extremities      Results for orders placed during the hospital encounter of  01/17/24    Duplex Venous Lower Extremity - Bilateral CAR    Interpretation Summary    No evidence of deep or superficial venous thrombosis in the right or left lower extremities      Results for orders placed during the hospital encounter of 01/17/24    Adult Transthoracic Echo Complete W/ Cont if Necessary Per Protocol (With Agitated Saline)    Interpretation Summary    Left ventricular ejection fraction appears to be 56 - 60%.    Saline test results are negative.    Estimated right ventricular systolic pressure from tricuspid regurgitation is normal (<35 mmHg). Calculated right ventricular systolic pressure from tricuspid regurgitation is 14 mmHg.      Plan for Follow-up of Pending Labs/Results:     Discharge Details        Discharge Medications        New Medications        Instructions Start Date   aspirin 81 MG chewable tablet   81 mg, Oral, Daily      pregabalin 25 MG capsule  Commonly known as: Lyrica   25 mg, Oral, Daily             Changes to Medications        Instructions Start Date   Levemir FlexTouch 100 UNIT/ML injection  Generic drug: insulin detemir  What changed: how much to take   10 Units, Subcutaneous, Daily      lisinopril 20 MG tablet  Commonly known as: PRINIVILZESTRIL  What changed:   medication strength  how much to take  when to take this   20 mg, Oral, Every 24 Hours Scheduled   Start Date: January 26, 2024            Continue These Medications        Instructions Start Date   acetaminophen 500 MG tablet  Commonly known as: TYLENOL   500 mg, Oral, Every 6 Hours PRN      amLODIPine 5 MG tablet  Commonly known as: NORVASC   5 mg, Oral, Daily      atorvastatin 80 MG tablet  Commonly known as: LIPITOR   80 mg, Oral, Nightly      clomiPRAMINE 25 MG capsule  Commonly known as: ANAFRANIL   25 mg, Oral, Nightly      cyclobenzaprine 10 MG tablet  Commonly known as: FLEXERIL   10 mg, Oral, 3 Times Daily PRN      divalproex 500 MG DR tablet  Commonly known as: DEPAKOTE   500 mg, Oral, 3 Times  "Daily      meclizine 25 MG tablet  Commonly known as: ANTIVERT   25 mg, Oral, 3 Times Daily PRN      metFORMIN 500 MG tablet  Commonly known as: GLUCOPHAGE   500 mg, Oral, 2 Times Daily With Meals      metoprolol succinate XL 25 MG 24 hr tablet  Commonly known as: TOPROL-XL   1 tablet, Oral, Daily      ranolazine 500 MG 12 hr tablet  Commonly known as: RANEXA   500 mg, Oral, 2 Times Daily      sertraline 100 MG tablet  Commonly known as: ZOLOFT   200 mg, Oral, Daily             Stop These Medications      hydroCHLOROthiazide 25 MG tablet  Commonly known as: HYDRODIURIL     lidocaine 5 %  Commonly known as: LIDODERM     naproxen 500 MG tablet  Commonly known as: NAPROSYN     Ozempic (0.25 or 0.5 MG/DOSE) 2 MG/3ML solution pen-injector  Generic drug: Semaglutide(0.25 or 0.5MG/DOS)     Ozempic (1 MG/DOSE) 4 MG/3ML solution pen-injector  Generic drug: Semaglutide (1 MG/DOSE)     prazosin 2 MG capsule  Commonly known as: MINIPRESS     PROPRANOLOL HCL PO     QUEtiapine 100 MG tablet  Commonly known as: SEROquel     Semaglutide (1 MG/DOSE) 2 MG/1.5ML solution pen-injector  Commonly known as: OZEMPIC              Allergies   Allergen Reactions    Trazodone Other (See Comments)     \" CAUSED ME TO HAVE AN ERECTION\"          Discharge Disposition:  Rehab Facility or Unit (DC - External)    Diet:  Hospital:  Diet Order   Procedures    Diet: Cardiac Diets; Healthy Heart (2-3 Na+); Texture: Regular Texture (IDDSI 7); Fluid Consistency: Thin (IDDSI 0)       Diet Instructions       Diet: Cardiac Diets, Diabetic Diets; Healthy Heart (2-3 Na+); Regular Texture (IDDSI 7); Thin (IDDSI 0); Consistent Carbohydrate      Discharge Diet:  Cardiac Diets  Diabetic Diets       Cardiac Diet: Healthy Heart (2-3 Na+)    Texture: Regular Texture (IDDSI 7)    Fluid Consistency: Thin (IDDSI 0)    Diabetic Diet: Consistent Carbohydrate             Activity:  Activity Instructions       Activity as Tolerated      Activity as Tolerated      Measure " Blood Pressure      After discharge from [facility], measure your blood pressure 2 times a day for 1 week, once in the morning before taking your medications and once in the evening around 5 PM.  Make sure you have been sitting for 5 minutes prior to checking your blood pressure and that your arm is at heart level.  Record these numbers and take to your follow-up appointment with your primary care provider and to your cardiology appointment.            Restrictions or Other Recommendations:         CODE STATUS:    Code Status and Medical Interventions:   Ordered at: 01/17/24 1259     Level Of Support Discussed With:    Patient     Code Status (Patient has no pulse and is not breathing):    CPR (Attempt to Resuscitate)     Medical Interventions (Patient has pulse or is breathing):    Full Support       No future appointments.    Additional Instructions for the Follow-ups that You Need to Schedule       Discharge Follow-up with PCP   As directed       Currently Documented PCP:    Pastora Carbajal APRN    PCP Phone Number:    [phone]     Follow Up Details: PCP Pastora Carbajal 1 week        Discharge Follow-up with PCP   As directed       Currently Documented PCP:    Pastora Carbajal APRN    PCP Phone Number:    [phone]     Follow Up Details: One week after discharge from [facility]        Discharge Follow-up with Specified Provider: Dr. Hatch, cardiologist at [facility], ph. [phone]; 1 Month   As directed      To: Dr. Hatch, cardiologist at [facility], ph. [phone]   Follow Up: 1 Month        Discharge Follow-up with Specified Provider: Dr. Dietrich with neurosurgery; 1 Month   As directed      To: Dr. Dietrich with neurosurgery   Follow Up: 1 Month                HILL Bailey  01/25/24      Time Spent on Discharge:  I spent  45  minutes on this discharge activity which included: face-to-face encounter with the patient, reviewing the data in the system, coordination of the  care with the nursing staff as well as consultants, documentation, and entering orders.

## 2024-03-21 ENCOUNTER — TELEPHONE (OUTPATIENT)
Dept: PAIN MEDICINE | Facility: CLINIC | Age: 44
End: 2024-03-21
Payer: COMMERCIAL

## 2024-03-21 DIAGNOSIS — M48.062 LUMBAR STENOSIS WITH NEUROGENIC CLAUDICATION: Primary | ICD-10-CM

## 2024-03-21 NOTE — TELEPHONE ENCOUNTER
Outpatient Therapy called and asked if we can send a referral for PT for low back pain.    Nichole Dave  Fax [phone]  Phone [phone]

## 2024-03-26 ENCOUNTER — APPOINTMENT (OUTPATIENT)
Dept: CT IMAGING | Facility: HOSPITAL | Age: 44
End: 2024-03-26
Payer: COMMERCIAL

## 2024-03-26 ENCOUNTER — HOSPITAL ENCOUNTER (EMERGENCY)
Facility: HOSPITAL | Age: 44
Discharge: HOME | End: 2024-03-26
Attending: EMERGENCY MEDICINE | Admitting: EMERGENCY MEDICINE
Payer: COMMERCIAL

## 2024-03-26 VITALS
BODY MASS INDEX: 35.36 KG/M2 | DIASTOLIC BLOOD PRESSURE: 85 MMHG | HEIGHT: 66 IN | OXYGEN SATURATION: 99 % | SYSTOLIC BLOOD PRESSURE: 120 MMHG | RESPIRATION RATE: 16 BRPM | HEART RATE: 63 BPM | WEIGHT: 220 LBS | TEMPERATURE: 98.3 F

## 2024-03-26 DIAGNOSIS — M48.062 LUMBAR STENOSIS WITH NEUROGENIC CLAUDICATION: Primary | ICD-10-CM

## 2024-03-26 DIAGNOSIS — R19.7 DIARRHEA, UNSPECIFIED TYPE: Primary | ICD-10-CM

## 2024-03-26 LAB
ALBUMIN SERPL-MCNC: 3.8 G/DL (ref 3.5–5.2)
ALBUMIN/GLOB SERPL: 1.2 G/DL
ALP SERPL-CCNC: 51 U/L (ref 39–117)
ALT SERPL W P-5'-P-CCNC: 17 U/L (ref 1–41)
ANION GAP SERPL CALCULATED.3IONS-SCNC: 12 MMOL/L (ref 5–15)
AST SERPL-CCNC: 24 U/L (ref 1–40)
B-OH-BUTYR SERPL-SCNC: 0.12 MMOL/L (ref 0.02–0.27)
BASOPHILS # BLD AUTO: 0.03 10*3/MM3 (ref 0–0.2)
BASOPHILS NFR BLD AUTO: 0.3 % (ref 0–1.5)
BILIRUB SERPL-MCNC: 0.3 MG/DL (ref 0–1.2)
BILIRUB UR QL STRIP: NEGATIVE
BUN SERPL-MCNC: 12 MG/DL (ref 6–20)
BUN/CREAT SERPL: 10.2 (ref 7–25)
CALCIUM SPEC-SCNC: 8.5 MG/DL (ref 8.6–10.5)
CHLORIDE SERPL-SCNC: 100 MMOL/L (ref 98–107)
CLARITY UR: CLEAR
CO2 SERPL-SCNC: 27 MMOL/L (ref 22–29)
COLOR UR: YELLOW
CREAT SERPL-MCNC: 1.18 MG/DL (ref 0.76–1.27)
D-LACTATE SERPL-SCNC: 1.4 MMOL/L (ref 0.5–2)
DEPRECATED RDW RBC AUTO: 43.4 FL (ref 37–54)
EGFRCR SERPLBLD CKD-EPI 2021: 78.5 ML/MIN/1.73
EOSINOPHIL # BLD AUTO: 0.32 10*3/MM3 (ref 0–0.4)
EOSINOPHIL NFR BLD AUTO: 3.6 % (ref 0.3–6.2)
ERYTHROCYTE [DISTWIDTH] IN BLOOD BY AUTOMATED COUNT: 13.2 % (ref 12.3–15.4)
GLOBULIN UR ELPH-MCNC: 3.2 GM/DL
GLUCOSE SERPL-MCNC: 103 MG/DL (ref 65–99)
GLUCOSE UR STRIP-MCNC: NEGATIVE MG/DL
HCT VFR BLD AUTO: 45.3 % (ref 37.5–51)
HGB BLD-MCNC: 15.1 G/DL (ref 13–17.7)
HGB UR QL STRIP.AUTO: NEGATIVE
HOLD SPECIMEN: NORMAL
IMM GRANULOCYTES # BLD AUTO: 0.01 10*3/MM3 (ref 0–0.05)
IMM GRANULOCYTES NFR BLD AUTO: 0.1 % (ref 0–0.5)
KETONES UR QL STRIP: ABNORMAL
LEUKOCYTE ESTERASE UR QL STRIP.AUTO: NEGATIVE
LIPASE SERPL-CCNC: 97 U/L (ref 13–60)
LYMPHOCYTES # BLD AUTO: 3.26 10*3/MM3 (ref 0.7–3.1)
LYMPHOCYTES NFR BLD AUTO: 36.4 % (ref 19.6–45.3)
MCH RBC QN AUTO: 29.5 PG (ref 26.6–33)
MCHC RBC AUTO-ENTMCNC: 33.3 G/DL (ref 31.5–35.7)
MCV RBC AUTO: 88.6 FL (ref 79–97)
MONOCYTES # BLD AUTO: 0.69 10*3/MM3 (ref 0.1–0.9)
MONOCYTES NFR BLD AUTO: 7.7 % (ref 5–12)
NEUTROPHILS NFR BLD AUTO: 4.65 10*3/MM3 (ref 1.7–7)
NEUTROPHILS NFR BLD AUTO: 51.9 % (ref 42.7–76)
NITRITE UR QL STRIP: NEGATIVE
NRBC BLD AUTO-RTO: 0 /100 WBC (ref 0–0.2)
PH UR STRIP.AUTO: 5.5 [PH] (ref 5–8)
PLAT MORPH BLD: NORMAL
PLATELET # BLD AUTO: 193 10*3/MM3 (ref 140–450)
PMV BLD AUTO: 12.8 FL (ref 6–12)
POTASSIUM SERPL-SCNC: 3.4 MMOL/L (ref 3.5–5.2)
PROT SERPL-MCNC: 7 G/DL (ref 6–8.5)
PROT UR QL STRIP: NEGATIVE
RBC # BLD AUTO: 5.11 10*6/MM3 (ref 4.14–5.8)
RBC MORPH BLD: NORMAL
SODIUM SERPL-SCNC: 139 MMOL/L (ref 136–145)
SP GR UR STRIP: 1.02 (ref 1–1.03)
UROBILINOGEN UR QL STRIP: ABNORMAL
VALPROATE SERPL-MCNC: 78.5 MCG/ML (ref 50–125)
WBC MORPH BLD: NORMAL
WBC NRBC COR # BLD AUTO: 8.96 10*3/MM3 (ref 3.4–10.8)
WHOLE BLOOD HOLD COAG: NORMAL
WHOLE BLOOD HOLD SPECIMEN: NORMAL

## 2024-03-26 PROCEDURE — 25810000003 SODIUM CHLORIDE 0.9 % SOLUTION

## 2024-03-26 PROCEDURE — 80164 ASSAY DIPROPYLACETIC ACD TOT: CPT

## 2024-03-26 PROCEDURE — 99285 EMERGENCY DEPT VISIT HI MDM: CPT

## 2024-03-26 PROCEDURE — 83690 ASSAY OF LIPASE: CPT | Performed by: EMERGENCY MEDICINE

## 2024-03-26 PROCEDURE — 96374 THER/PROPH/DIAG INJ IV PUSH: CPT

## 2024-03-26 PROCEDURE — 25010000002 ONDANSETRON PER 1 MG

## 2024-03-26 PROCEDURE — 96361 HYDRATE IV INFUSION ADD-ON: CPT

## 2024-03-26 PROCEDURE — 85007 BL SMEAR W/DIFF WBC COUNT: CPT | Performed by: EMERGENCY MEDICINE

## 2024-03-26 PROCEDURE — 83605 ASSAY OF LACTIC ACID: CPT

## 2024-03-26 PROCEDURE — 82010 KETONE BODYS QUAN: CPT

## 2024-03-26 PROCEDURE — 80053 COMPREHEN METABOLIC PANEL: CPT | Performed by: EMERGENCY MEDICINE

## 2024-03-26 PROCEDURE — 25010000002 DICYCLOMINE PER 20 MG

## 2024-03-26 PROCEDURE — 74177 CT ABD & PELVIS W/CONTRAST: CPT

## 2024-03-26 PROCEDURE — 25510000001 IOPAMIDOL 61 % SOLUTION: Performed by: EMERGENCY MEDICINE

## 2024-03-26 PROCEDURE — 85025 COMPLETE CBC W/AUTO DIFF WBC: CPT | Performed by: EMERGENCY MEDICINE

## 2024-03-26 PROCEDURE — 81003 URINALYSIS AUTO W/O SCOPE: CPT | Performed by: EMERGENCY MEDICINE

## 2024-03-26 PROCEDURE — 96372 THER/PROPH/DIAG INJ SC/IM: CPT

## 2024-03-26 PROCEDURE — 36415 COLL VENOUS BLD VENIPUNCTURE: CPT

## 2024-03-26 RX ORDER — SODIUM CHLORIDE 9 MG/ML
10 INJECTION, SOLUTION INTRAMUSCULAR; INTRAVENOUS; SUBCUTANEOUS AS NEEDED
Status: DISCONTINUED | OUTPATIENT
Start: 2024-03-26 | End: 2024-03-26 | Stop reason: HOSPADM

## 2024-03-26 RX ORDER — DICYCLOMINE HYDROCHLORIDE 10 MG/ML
20 INJECTION INTRAMUSCULAR ONCE
Status: COMPLETED | OUTPATIENT
Start: 2024-03-26 | End: 2024-03-26

## 2024-03-26 RX ORDER — DICYCLOMINE HYDROCHLORIDE 10 MG/1
10 CAPSULE ORAL 3 TIMES DAILY PRN
Qty: 21 CAPSULE | Refills: 0 | Status: SHIPPED | OUTPATIENT
Start: 2024-03-26 | End: 2024-04-02

## 2024-03-26 RX ORDER — ONDANSETRON 2 MG/ML
4 INJECTION INTRAMUSCULAR; INTRAVENOUS ONCE
Status: COMPLETED | OUTPATIENT
Start: 2024-03-26 | End: 2024-03-26

## 2024-03-26 RX ORDER — ONDANSETRON 4 MG/1
4 TABLET, ORALLY DISINTEGRATING ORAL EVERY 8 HOURS PRN
Qty: 15 TABLET | Refills: 0 | Status: SHIPPED | OUTPATIENT
Start: 2024-03-26

## 2024-03-26 RX ADMIN — SODIUM CHLORIDE 1000 ML: 9 INJECTION, SOLUTION INTRAVENOUS at 09:26

## 2024-03-26 RX ADMIN — DICYCLOMINE HYDROCHLORIDE 20 MG: 10 INJECTION, SOLUTION INTRAMUSCULAR at 08:26

## 2024-03-26 RX ADMIN — ONDANSETRON 4 MG: 2 INJECTION INTRAMUSCULAR; INTRAVENOUS at 08:26

## 2024-03-26 RX ADMIN — IOPAMIDOL 85 ML: 612 INJECTION, SOLUTION INTRAVENOUS at 10:08

## 2024-03-26 NOTE — DISCHARGE INSTRUCTIONS
Please return with worsening symptoms.  Please follow-up with primary care to make an appointment.

## 2024-03-26 NOTE — ED PROVIDER NOTES
"Subjective   History of Present Illness  Patient is a 44-year-old male with past medical history as mentioned below who presents with diarrhea and abdominal cramping for 3 days.  Patient states anytime he eats or drinks something he has diarrhea.  Patient denies blood in the stool or black tarry stool.  Patient denies recent travel.  Patient denies nausea, vomiting, fever, chills, chest pain, shortness of breath  Review of Systems    Past Medical History:   Diagnosis Date    Anxiety     Bipolar depression     Depression     Diabetes mellitus     Diabetic amyotrophy associated with type 2 diabetes mellitus     Hypertension     Leaky heart valve     Sleep apnea        Allergies   Allergen Reactions    Trazodone Other (See Comments)     \" CAUSED ME TO HAVE AN ERECTION\"        No past surgical history on file.    Family History   Problem Relation Age of Onset    Diabetes Maternal Grandmother        Social History     Socioeconomic History    Marital status:    Tobacco Use    Smoking status: Some Days     Types: Cigars     Passive exposure: Never    Smokeless tobacco: Never   Vaping Use    Vaping status: Never Used   Substance and Sexual Activity    Alcohol use: Never    Drug use: Never    Sexual activity: Defer           Objective   Physical Exam  Physical Exam  GENERAL:Well developed.  Appears in no acute distress.  HENT: Nares patent  EYES: No scleral icterus  CV: Regular rhythm, regular rate  RESPIRATORY: Normal effort.  No audible wheezes, rales or rhonchi  ABDOMEN: Soft, mildly diffusely tender. No rebound tenderness or guarding noted.  Bowel sounds active in all quadrants.  MUSCULOSKELETAL: No deformities.   NEURO: Alert, moves all extremities, follows commands  SKIN: Warm, dry, no rash visualized   Procedures           ED Course  ED Course as of 03/26/24 1655   Tue Mar 26, 2024   0926 BP initially hypertensive, is now 121/91 [OM]   0955 Anion gap within normal limits [OM]      ED Course User Index  [OM] " Jeannie Arcos PA-C                                 Differential diagnosis includes enteritis, colitis, GI virus, C. difficile, diarrheal illness, DKA    Patient presents with 3 days of diarrhea with some abdominal cramping.  Patient is not hypotensive or tachycardic and does not appear hypovolemic.  Patient was given a liter of fluids, Bentyl and Zofran.  On recheck patient states he has not had any diarrhea while in the emergency department and feels much better.  Lipase mildly elevated, no specific  tenderness in the epigastric region and does not meet the qualifications for 2-3 times greater than the normal limit to indicate pancreatitis.  CT abdomen pelvis did not show any acute abnormalities sides fat-containing inguinal hernias bilaterally which would not be contributing to his diarrhea. White count within normal limits.  Other lab work nonactionable patient is not in DKA.  Patient resting comfortably throughout emergency department visit. Discussed with patients the results from today's visit.  Patient prescribed Bentyl and Zofran that helped his symptoms in the emergency department.  Discussed with patient strict return precautions and they verbalize understanding. Recommend to them following up with primary care as soon as possible. Patient is discharged hemodynamically stable and comfortable.               Medical Decision Making  Problems Addressed:  Diarrhea, unspecified type: complicated acute illness or injury    Amount and/or Complexity of Data Reviewed  Labs: ordered.  Radiology: ordered.    Risk  Prescription drug management.        Final diagnoses:   Diarrhea, unspecified type       ED Disposition  ED Disposition       ED Disposition   Discharge    Condition   Stable    Comment   --               Pastora Carbajal, APRN  [address] [facility]  [address]  [facility] [address]  [phone]      Please call your primary care to make an appointment to follow-up.         Medication List        New Prescriptions       dicyclomine 10 MG capsule  Commonly known as: BENTYL  Take 1 capsule by mouth 3 (Three) Times a Day As Needed for Abdominal Cramping for up to 7 days.     ondansetron ODT 4 MG disintegrating tablet  Commonly known as: ZOFRAN-ODT  Place 1 tablet on the tongue Every 8 (Eight) Hours As Needed for Nausea or Vomiting.               Where to Get Your Medications        These medications were sent to [facility] [address] [place] [address] [phone]  - [phone] [place] [address]      Phone: [phone]   dicyclomine 10 MG capsule  ondansetron ODT 4 MG disintegrating tablet            Jeannie Arcos PA-C  03/26/24 1121       Jeannie Arcos PA-C  03/26/24 8010

## 2024-04-02 ENCOUNTER — HOSPITAL ENCOUNTER (OUTPATIENT)
Dept: GENERAL RADIOLOGY | Facility: HOSPITAL | Age: 44
Discharge: HOME | End: 2024-04-02
Admitting: NEUROLOGICAL SURGERY
Payer: COMMERCIAL

## 2024-04-02 DIAGNOSIS — M51.36 DDD (DEGENERATIVE DISC DISEASE), LUMBAR: ICD-10-CM

## 2024-04-02 DIAGNOSIS — M79.605 PAIN OF LEFT LOWER EXTREMITY: ICD-10-CM

## 2024-04-02 PROCEDURE — 72120 X-RAY BEND ONLY L-S SPINE: CPT

## 2024-10-17 ENCOUNTER — TELEPHONE (OUTPATIENT)
Dept: NEUROSURGERY | Facility: CLINIC | Age: 44
End: 2024-10-17

## 2024-10-17 NOTE — TELEPHONE ENCOUNTER
Caller: SHAHAB    Relationship to patient: SELF    Best call back number: [phone]    Chief complaint: FOLLOW UP ON LUMBAR BACK PAIN. PT HAS BEEN IN [facility] DOING PHYSICAL THERAPY._ MRI LUMBAR SPINE FLEX AND EXT 4/2/2024 @      Type of visit: FOLLOW UP EXT    Requested date: THURS OR FRI     If rescheduling, when is the original appointment: 10/17/24 @  930    Additional notes:PATIENT CALLED WANTING TO RESCHEDULE CANCELED APPT THAT ORIGINALLY SCHEDULED FOR TODAY @ 930 - PATIENT GOT OUT OF [facility] ON 10/15/24    PLEASE ADVISE  THANK YOU

## 2024-11-15 ENCOUNTER — TELEPHONE (OUTPATIENT)
Dept: NEUROSURGERY | Facility: CLINIC | Age: 44
End: 2024-11-15
Payer: COMMERCIAL

## 2025-03-04 RX ORDER — AMLODIPINE BESYLATE 5 MG/1
5 TABLET ORAL DAILY
Qty: 90 TABLET | Refills: 0 | Status: SHIPPED | OUTPATIENT
Start: 2025-03-04

## 2025-03-04 NOTE — TELEPHONE ENCOUNTER
Rx Refill Note  Requested Prescriptions     Pending Prescriptions Disp Refills    amLODIPine (NORVASC) 5 MG tablet 90 tablet 0     Sig: Take 1 tablet by mouth Daily.      Last office visit with prescribing clinician: 07/19/24  Last telemedicine visit with prescribing clinician: Visit date not found   Next office visit with prescribing clinician: Visit date not found                        Would you like a call back once the refill request has been completed: [] Yes [x] No [] N/A    If the office needs to give you a call back, can they leave a voicemail: [] Yes [x] No [] N/A    Pharmacy Info    Last Fill Date: 02/28/25  Rx Written Date: NA  Prescribed Qty: 90  Additional Details from Pharmacy: NA    PATIENT PASSED PROTOCOLS PER GINO Danielson MA  03/04/25, 14:07 EST

## 2025-04-07 DIAGNOSIS — E78.5 HYPERLIPIDEMIA LDL GOAL <70: ICD-10-CM

## 2025-04-07 DIAGNOSIS — R07.9 CHEST PAIN, UNSPECIFIED TYPE: Primary | ICD-10-CM

## 2025-04-07 RX ORDER — RANOLAZINE 500 MG/1
500 TABLET, EXTENDED RELEASE ORAL 2 TIMES DAILY
Qty: 180 TABLET | Refills: 0 | Status: SHIPPED | OUTPATIENT
Start: 2025-04-07

## 2025-04-07 RX ORDER — ATORVASTATIN CALCIUM 80 MG/1
80 TABLET, FILM COATED ORAL NIGHTLY
Qty: 90 TABLET | Refills: 0 | Status: SHIPPED | OUTPATIENT
Start: 2025-04-07

## 2025-05-23 DIAGNOSIS — I10 ESSENTIAL HYPERTENSION: Primary | ICD-10-CM

## 2025-05-23 RX ORDER — METOPROLOL SUCCINATE 25 MG/1
25 TABLET, EXTENDED RELEASE ORAL DAILY
Qty: 30 TABLET | Refills: 0 | Status: SHIPPED | OUTPATIENT
Start: 2025-05-23

## 2025-05-27 DIAGNOSIS — I10 ESSENTIAL HYPERTENSION: ICD-10-CM

## 2025-05-27 DIAGNOSIS — E78.5 HYPERLIPIDEMIA LDL GOAL <70: ICD-10-CM

## 2025-05-27 RX ORDER — METOPROLOL SUCCINATE 25 MG/1
25 TABLET, EXTENDED RELEASE ORAL DAILY
Qty: 30 TABLET | Refills: 0 | Status: SHIPPED | OUTPATIENT
Start: 2025-05-27 | End: 2025-05-27 | Stop reason: SDUPTHER

## 2025-05-27 RX ORDER — AMLODIPINE BESYLATE 5 MG/1
5 TABLET ORAL DAILY
Qty: 60 TABLET | Refills: 0 | Status: SHIPPED | OUTPATIENT
Start: 2025-05-27

## 2025-05-27 RX ORDER — HYDROCHLOROTHIAZIDE 25 MG/1
25 TABLET ORAL DAILY
Qty: 60 TABLET | Refills: 0 | Status: SHIPPED | OUTPATIENT
Start: 2025-05-27

## 2025-05-27 RX ORDER — HYDROCHLOROTHIAZIDE 25 MG/1
25 TABLET ORAL DAILY
COMMUNITY
End: 2025-05-27 | Stop reason: SDUPTHER

## 2025-05-27 RX ORDER — ATORVASTATIN CALCIUM 80 MG/1
80 TABLET, FILM COATED ORAL NIGHTLY
Qty: 60 TABLET | Refills: 0 | Status: SHIPPED | OUTPATIENT
Start: 2025-05-27

## 2025-05-27 RX ORDER — METOPROLOL SUCCINATE 25 MG/1
25 TABLET, EXTENDED RELEASE ORAL DAILY
Qty: 60 TABLET | Refills: 0 | Status: SHIPPED | OUTPATIENT
Start: 2025-05-27

## 2025-05-27 RX ORDER — LISINOPRIL 40 MG/1
40 TABLET ORAL DAILY
Qty: 60 TABLET | Refills: 0 | Status: SHIPPED | OUTPATIENT
Start: 2025-05-27

## 2025-05-27 RX ORDER — LISINOPRIL 40 MG/1
40 TABLET ORAL DAILY
COMMUNITY
End: 2025-05-27 | Stop reason: SDUPTHER

## 2025-05-27 NOTE — TELEPHONE ENCOUNTER
Rx Refill Note  Requested Prescriptions     Pending Prescriptions Disp Refills    amLODIPine (NORVASC) 5 MG tablet 60 tablet 0     Sig: Take 1 tablet by mouth Daily.    hydroCHLOROthiazide 25 MG tablet 60 tablet 0     Sig: Take 1 tablet by mouth Daily.    metoprolol succinate XL (TOPROL-XL) 25 MG 24 hr tablet 60 tablet 0     Sig: Take 1 tablet by mouth Daily.    atorvastatin (LIPITOR) 80 MG tablet 60 tablet 0     Sig: Take 1 tablet by mouth Every Night.    lisinopril (PRINIVIL,ZESTRIL) 40 MG tablet 60 tablet 0     Sig: Take 1 tablet by mouth Daily.      Patient called stating he needed refills on all medications that Dr. Hatch prescribes. Passed protocol per [place], sent in rx. Sent in 60 day supply to [facility] in [place]. Patient will need an appt for further refills, as July will make one year since patient last seen provider. Some of these medications were recently sent in, but sent to wrong pharmacy.     Last office visit with prescribing clinician: 07/19/2024   Last telemedicine visit with prescribing clinician: Visit date not found   Next office visit with prescribing clinician: Visit date not found                        Would you like a call back once the refill request has been completed: [] Yes [] No [] N/A    If the office needs to give you a call back, can they leave a voicemail: [] Yes [] No [] N/A    Pharmacy Info    Last Fill Date:   Rx Written Date:   Prescribed Qty:   Additional Details from Pharmacy:       Carla Kelly MA  05/27/25, 09:54 EDT

## 2025-06-10 ENCOUNTER — TELEPHONE (OUTPATIENT)
Age: 45
End: 2025-06-10
Payer: COMMERCIAL

## 2025-06-10 NOTE — TELEPHONE ENCOUNTER
Patient's wife lvm on clinical line stating the patient has called for weeks requesting refills on medications that he is completely out of. Wife stated in vm that they needed to be sent to [facility] on [address] in [place].     After reviewing patient's chart, the medications patient requested were sent in to pharmacy stated above on 05/27/2025. I then called that pharmacy to see if they has received those refills. The pharmacy technician I had spoke with stated that the prescriptions were filled and picked up on 05/29/2025 by the patient.     I then called patient and advised him that they were already filled and picked up. Patient states he never received his refills and has been out for a while now. I then advised patient to call the pharmacy and discuss with them and tell them that I had just spoke with a tech regarding refills, patient understood and will give them a call.     I also advised patient that he would need an appt for further refills. July will make one year since last visit with provider. Patient states he is waiting to see what ortho is going to do about his back because he is bed ridden at the moment and cannot come in. I advised the patient to call us as soon as he knows something from them so we can get him on for an appt, patient understood.

## 2025-07-13 DIAGNOSIS — R07.9 CHEST PAIN, UNSPECIFIED TYPE: ICD-10-CM

## 2025-07-14 RX ORDER — RANOLAZINE 500 MG/1
500 TABLET, EXTENDED RELEASE ORAL 2 TIMES DAILY
Qty: 60 TABLET | Refills: 0 | Status: SHIPPED | OUTPATIENT
Start: 2025-07-14

## 2025-07-14 NOTE — TELEPHONE ENCOUNTER
Rx Refill Note  Requested Prescriptions     Signed Prescriptions Disp Refills    ranolazine (RANEXA) 500 MG 12 hr tablet 60 tablet 0     Sig: Take 1 tablet by mouth twice daily     Authorizing Provider: REJI FIGUEROA     Ordering User: BARRY PERKINS      Last office visit with prescribing clinician: Visit date not found   Last telemedicine visit with prescribing clinician: Visit date not found   Next office visit with prescribing clinician: Visit date not found                        Pharmacy Info    Last Fill Date:  Rx Written Date:   Prescribed Qty:   Additional Details from Pharmacy:    Patient passed protocols per nelson.         Barry Perkins MA  07/14/25, 12:26 EDT

## 2025-07-22 ENCOUNTER — HOSPITAL ENCOUNTER (EMERGENCY)
Facility: HOSPITAL | Age: 45
Discharge: HOME | End: 2025-07-22
Attending: EMERGENCY MEDICINE | Admitting: EMERGENCY MEDICINE
Payer: COMMERCIAL

## 2025-07-22 ENCOUNTER — APPOINTMENT (OUTPATIENT)
Dept: CT IMAGING | Facility: HOSPITAL | Age: 45
End: 2025-07-22
Payer: COMMERCIAL

## 2025-07-22 VITALS
TEMPERATURE: 97.4 F | DIASTOLIC BLOOD PRESSURE: 78 MMHG | WEIGHT: 230 LBS | SYSTOLIC BLOOD PRESSURE: 122 MMHG | OXYGEN SATURATION: 95 % | HEART RATE: 88 BPM | BODY MASS INDEX: 38.32 KG/M2 | RESPIRATION RATE: 20 BRPM | HEIGHT: 65 IN

## 2025-07-22 DIAGNOSIS — K92.1 HEMATOCHEZIA: ICD-10-CM

## 2025-07-22 DIAGNOSIS — R10.84 DIFFUSE ABDOMINAL PAIN: Primary | ICD-10-CM

## 2025-07-22 DIAGNOSIS — K40.20 BILATERAL INGUINAL HERNIA WITHOUT OBSTRUCTION OR GANGRENE, RECURRENCE NOT SPECIFIED: ICD-10-CM

## 2025-07-22 LAB
ALBUMIN SERPL-MCNC: 4.2 G/DL (ref 3.5–5.2)
ALBUMIN/GLOB SERPL: 1.3 G/DL
ALP SERPL-CCNC: 65 U/L (ref 39–117)
ALT SERPL W P-5'-P-CCNC: 15 U/L (ref 1–41)
ANION GAP SERPL CALCULATED.3IONS-SCNC: 9 MMOL/L (ref 5–15)
AST SERPL-CCNC: 20 U/L (ref 1–40)
BASOPHILS # BLD AUTO: 0.05 10*3/MM3 (ref 0–0.2)
BASOPHILS NFR BLD AUTO: 0.6 % (ref 0–1.5)
BILIRUB SERPL-MCNC: 0.2 MG/DL (ref 0–1.2)
BILIRUB UR QL STRIP: NEGATIVE
BUN SERPL-MCNC: 10 MG/DL (ref 6–20)
BUN/CREAT SERPL: 8.2 (ref 7–25)
CALCIUM SPEC-SCNC: 8.9 MG/DL (ref 8.6–10.5)
CHLORIDE SERPL-SCNC: 104 MMOL/L (ref 98–107)
CLARITY UR: CLEAR
CO2 SERPL-SCNC: 30 MMOL/L (ref 22–29)
COLOR UR: YELLOW
CREAT SERPL-MCNC: 1.22 MG/DL (ref 0.76–1.27)
DEPRECATED RDW RBC AUTO: 46.8 FL (ref 37–54)
EGFRCR SERPLBLD CKD-EPI 2021: 75 ML/MIN/1.73
EOSINOPHIL # BLD AUTO: 0.03 10*3/MM3 (ref 0–0.4)
EOSINOPHIL NFR BLD AUTO: 0.3 % (ref 0.3–6.2)
ERYTHROCYTE [DISTWIDTH] IN BLOOD BY AUTOMATED COUNT: 14.6 % (ref 12.3–15.4)
GLOBULIN UR ELPH-MCNC: 3.3 GM/DL
GLUCOSE SERPL-MCNC: 81 MG/DL (ref 65–99)
GLUCOSE UR STRIP-MCNC: NEGATIVE MG/DL
HCT VFR BLD AUTO: 46.5 % (ref 37.5–51)
HGB BLD-MCNC: 15.3 G/DL (ref 13–17.7)
HGB UR QL STRIP.AUTO: NEGATIVE
HOLD SPECIMEN: NORMAL
IMM GRANULOCYTES # BLD AUTO: 0.01 10*3/MM3 (ref 0–0.05)
IMM GRANULOCYTES NFR BLD AUTO: 0.1 % (ref 0–0.5)
KETONES UR QL STRIP: ABNORMAL
LEUKOCYTE ESTERASE UR QL STRIP.AUTO: NEGATIVE
LIPASE SERPL-CCNC: 79 U/L (ref 13–60)
LYMPHOCYTES # BLD AUTO: 3.08 10*3/MM3 (ref 0.7–3.1)
LYMPHOCYTES NFR BLD AUTO: 34.6 % (ref 19.6–45.3)
MCH RBC QN AUTO: 29.2 PG (ref 26.6–33)
MCHC RBC AUTO-ENTMCNC: 32.9 G/DL (ref 31.5–35.7)
MCV RBC AUTO: 88.7 FL (ref 79–97)
MONOCYTES # BLD AUTO: 0.81 10*3/MM3 (ref 0.1–0.9)
MONOCYTES NFR BLD AUTO: 9.1 % (ref 5–12)
NEUTROPHILS NFR BLD AUTO: 4.93 10*3/MM3 (ref 1.7–7)
NEUTROPHILS NFR BLD AUTO: 55.3 % (ref 42.7–76)
NITRITE UR QL STRIP: NEGATIVE
NRBC BLD AUTO-RTO: 0 /100 WBC (ref 0–0.2)
PH UR STRIP.AUTO: 6.5 [PH] (ref 5–8)
PLAT MORPH BLD: NORMAL
PLATELET # BLD AUTO: 203 10*3/MM3 (ref 140–450)
PMV BLD AUTO: 12.9 FL (ref 6–12)
POTASSIUM SERPL-SCNC: 3.6 MMOL/L (ref 3.5–5.2)
PROT SERPL-MCNC: 7.5 G/DL (ref 6–8.5)
PROT UR QL STRIP: ABNORMAL
RBC # BLD AUTO: 5.24 10*6/MM3 (ref 4.14–5.8)
RBC MORPH BLD: NORMAL
SODIUM SERPL-SCNC: 143 MMOL/L (ref 136–145)
SP GR UR STRIP: 1.02 (ref 1–1.03)
UROBILINOGEN UR QL STRIP: ABNORMAL
WBC MORPH BLD: NORMAL
WBC NRBC COR # BLD AUTO: 8.91 10*3/MM3 (ref 3.4–10.8)
WHOLE BLOOD HOLD COAG: NORMAL
WHOLE BLOOD HOLD SPECIMEN: NORMAL

## 2025-07-22 PROCEDURE — 25510000001 IOPAMIDOL 61 % SOLUTION: Performed by: EMERGENCY MEDICINE

## 2025-07-22 PROCEDURE — 85007 BL SMEAR W/DIFF WBC COUNT: CPT | Performed by: EMERGENCY MEDICINE

## 2025-07-22 PROCEDURE — 74177 CT ABD & PELVIS W/CONTRAST: CPT

## 2025-07-22 PROCEDURE — 83690 ASSAY OF LIPASE: CPT | Performed by: EMERGENCY MEDICINE

## 2025-07-22 PROCEDURE — 25510000002 DIATRIZOATE MEGLUMINE & SODIUM PER 1 ML: Performed by: EMERGENCY MEDICINE

## 2025-07-22 PROCEDURE — 85025 COMPLETE CBC W/AUTO DIFF WBC: CPT | Performed by: EMERGENCY MEDICINE

## 2025-07-22 PROCEDURE — 99285 EMERGENCY DEPT VISIT HI MDM: CPT

## 2025-07-22 PROCEDURE — 80053 COMPREHEN METABOLIC PANEL: CPT | Performed by: EMERGENCY MEDICINE

## 2025-07-22 PROCEDURE — 81003 URINALYSIS AUTO W/O SCOPE: CPT | Performed by: EMERGENCY MEDICINE

## 2025-07-22 RX ORDER — DIATRIZOATE MEGLUMINE AND DIATRIZOATE SODIUM 660; 100 MG/ML; MG/ML
15 SOLUTION ORAL; RECTAL ONCE
Status: COMPLETED | OUTPATIENT
Start: 2025-07-22 | End: 2025-07-22

## 2025-07-22 RX ORDER — SODIUM CHLORIDE 9 MG/ML
10 INJECTION, SOLUTION INTRAMUSCULAR; INTRAVENOUS; SUBCUTANEOUS AS NEEDED
Status: DISCONTINUED | OUTPATIENT
Start: 2025-07-22 | End: 2025-07-22 | Stop reason: HOSPADM

## 2025-07-22 RX ORDER — SODIUM CHLORIDE 0.9 % (FLUSH) 0.9 %
10 SYRINGE (ML) INJECTION AS NEEDED
Status: DISCONTINUED | OUTPATIENT
Start: 2025-07-22 | End: 2025-07-22 | Stop reason: HOSPADM

## 2025-07-22 RX ORDER — IOPAMIDOL 612 MG/ML
85 INJECTION, SOLUTION INTRAVASCULAR
Status: COMPLETED | OUTPATIENT
Start: 2025-07-22 | End: 2025-07-22

## 2025-07-22 RX ADMIN — DIATRIZOATE MEGLUMINE AND DIATRIZOATE SODIUM 15 ML: 660; 100 LIQUID ORAL; RECTAL at 08:33

## 2025-07-22 RX ADMIN — IOPAMIDOL 85 ML: 612 INJECTION, SOLUTION INTRAVENOUS at 09:37

## 2025-07-22 NOTE — ED PROVIDER NOTES
"Subjective   History of Present Illness  Mr. Swift presents with low mid abdominal pain.  Has been present off and on for about a year.  Has been gradually worsening.  Reports bright red blood and clots in his stool for about 6 months.  Reports that he strains at stool although his stools are soft.  This has worsened as well.  Appetite intact.  Reports intermittent vomiting and swelling of his abdomen.  No prior abdominal surgeries.  Has history of diabetes.      Review of Systems    Past Medical History:   Diagnosis Date    Anxiety     Bipolar depression     Chronic chest pain     normal spect/echo 2016. well controlled with ranexa. chronic sharp chest pain since childhood worse with movement    Chronic kidney disease     cr 1.49 9/2016    Depression     Diabetes mellitus     Diabetic amyotrophy associated with type 2 diabetes mellitus     Dyslipidemia     Hypercholesterolemia      9/2016    Hypertension     Leaky heart valve     Mitral regurgitation     MILD BY ECHO    Sleep apnea        Allergies   Allergen Reactions    Trazodone Other (See Comments)     \" CAUSED ME TO HAVE AN ERECTION\"        No past surgical history on file.    Family History   Problem Relation Age of Onset    Diabetes Maternal Grandmother     Hypertension Maternal Grandmother 65    Heart attack Maternal Grandmother 65    Arthritis Other     Cancer Other     Heart defect Other        Social History     Socioeconomic History    Marital status:    Tobacco Use    Smoking status: Some Days     Types: Cigars     Passive exposure: Never    Smokeless tobacco: Never    Tobacco comments:     1 pk less/day   Vaping Use    Vaping status: Never Used   Substance and Sexual Activity    Alcohol use: Never    Drug use: Never    Sexual activity: Defer           Objective   Physical Exam  Vitals and nursing note reviewed.   Constitutional:       General: He is not in acute distress.     Appearance: Normal appearance.   HENT:      Head: " Normocephalic and atraumatic.      Nose: Nose normal. No congestion or rhinorrhea.   Eyes:      General: No scleral icterus.     Conjunctiva/sclera: Conjunctivae normal.   Neck:      Comments: No JVD   Cardiovascular:      Rate and Rhythm: Normal rate and regular rhythm.      Heart sounds: No murmur heard.     No friction rub.   Pulmonary:      Effort: Pulmonary effort is normal.      Breath sounds: Normal breath sounds. No wheezing or rales.   Abdominal:      General: Bowel sounds are normal.      Palpations: Abdomen is soft.      Tenderness: There is generalized abdominal tenderness. There is no guarding or rebound.   Genitourinary:     Penis: Normal.       Testes: Normal.         Right: Mass not present.         Left: Mass not present.      Rectum: Normal. No mass.      Comments: On inspection of his anus there is no hemorrhoid or fissure.   exam is normal.  I do not appreciate hernias with him lying supine.  Musculoskeletal:         General: No tenderness.      Cervical back: Normal range of motion and neck supple.      Right lower leg: No edema.      Left lower leg: No edema.   Skin:     General: Skin is warm and dry.      Coloration: Skin is not pale.      Findings: No erythema.   Neurological:      General: No focal deficit present.      Mental Status: He is alert and oriented to person, place, and time.      Motor: No weakness.      Coordination: Coordination normal.   Psychiatric:         Mood and Affect: Mood normal.         Behavior: Behavior normal.         Thought Content: Thought content normal.         Procedures           ED Course  ED Course as of 07/22/25 1409   e Jul 22, 2025   1027 CT shows bilateral inguinal hernias, the one on the right contains part of his bladder.  No intra-abdominal processes are seen. [DT]      ED Course User Index  [DT] Camron Jacobson MD KASPER reviewed by Camron Jacobson MD       Medical Decision Making  Ordered and  interpreted multiple labs and CT scan.    Problems Addressed:  Bilateral inguinal hernia without obstruction or gangrene, recurrence not specified: undiagnosed new problem with uncertain prognosis  Diffuse abdominal pain: complicated acute illness or injury  Hematochezia: complicated acute illness or injury that poses a threat to life or bodily functions    Amount and/or Complexity of Data Reviewed  Labs: ordered. Decision-making details documented in ED Course.  Radiology: ordered. Decision-making details documented in ED Course.    Risk  Prescription drug management.        Final diagnoses:   Diffuse abdominal pain   Hematochezia   Bilateral inguinal hernia without obstruction or gangrene, recurrence not specified       ED Disposition  ED Disposition       ED Disposition   Discharge    Condition   Stable    Comment   --               Pastora Carbajal, APRN  [address] [facility]  [address]  [place] [id number]  [phone]          [facility]  [address] [facility] [address] [address]  [facility] [address]  [phone]        Nabil Coe MD  [address]  [place] [address]  [phone]               Medication List      No changes were made to your prescriptions during this visit.            Camron Jacobson MD  07/22/25 7665

## 2025-07-22 NOTE — DISCHARGE INSTRUCTIONS
I have sent in a referral to general surgery for discussion of possible surgery for your inguinal hernias.  If you do not hear from them in 24 hours then call them at the number I have listed above for Dr. Coe.  I have sent in a referral for gastroenterology for further evaluation of your abdominal pain and rectal bleeding.  If you do not hear from them in 24 hours then call them at the number I have listed above.  I would recommend a high-fiber diet and extra fluids.  Return if any concerns

## 2025-07-22 NOTE — Clinical Note
[facility] EMERGENCY DEPARTMENT  [address]  [facility] [address]  Phone: [phone]    Regina Parra accompanied Olivier Swift to the emergency department on 7/22/2025. They may return to work on 07/23/2025.        Thank you for choosing [facility].    Camron Jacobson MD

## 2025-07-28 RX ORDER — PEG-3350, SODIUM SULFATE, SODIUM CHLORIDE, POTASSIUM CHLORIDE, SODIUM ASCORBATE AND ASCORBIC ACID 7.5-2.691G
KIT ORAL
Qty: 1 EACH | Refills: 0 | Status: SHIPPED | OUTPATIENT
Start: 2025-07-28

## 2025-08-06 ENCOUNTER — OUTSIDE FACILITY SERVICE (OUTPATIENT)
Dept: GASTROENTEROLOGY | Facility: CLINIC | Age: 45
End: 2025-08-06
Payer: COMMERCIAL

## 2025-08-06 PROCEDURE — 88305 TISSUE EXAM BY PATHOLOGIST: CPT | Performed by: INTERNAL MEDICINE

## 2025-08-07 ENCOUNTER — LAB REQUISITION (OUTPATIENT)
Dept: LAB | Facility: HOSPITAL | Age: 45
End: 2025-08-07
Payer: COMMERCIAL

## 2025-08-07 DIAGNOSIS — K62.5 HEMORRHAGE OF ANUS AND RECTUM: ICD-10-CM

## 2025-08-07 DIAGNOSIS — Z80.0 FAMILY HISTORY OF MALIGNANT NEOPLASM OF DIGESTIVE ORGANS: ICD-10-CM

## 2025-08-08 LAB
CYTO UR: NORMAL
LAB AP CASE REPORT: NORMAL
LAB AP CLINICAL INFORMATION: NORMAL
PATH REPORT.FINAL DX SPEC: NORMAL
PATH REPORT.GROSS SPEC: NORMAL

## 2025-08-14 ENCOUNTER — TELEPHONE (OUTPATIENT)
Age: 45
End: 2025-08-14

## 2025-08-14 ENCOUNTER — OFFICE VISIT (OUTPATIENT)
Age: 45
End: 2025-08-14
Payer: COMMERCIAL

## 2025-08-14 VITALS
HEIGHT: 66 IN | BODY MASS INDEX: 41.96 KG/M2 | DIASTOLIC BLOOD PRESSURE: 80 MMHG | HEART RATE: 101 BPM | SYSTOLIC BLOOD PRESSURE: 114 MMHG | WEIGHT: 261.1 LBS

## 2025-08-14 DIAGNOSIS — R07.9 CHEST PAIN, UNSPECIFIED TYPE: ICD-10-CM

## 2025-08-14 DIAGNOSIS — E78.5 HYPERLIPIDEMIA LDL GOAL <70: ICD-10-CM

## 2025-08-14 DIAGNOSIS — R07.89 CHEST PRESSURE: ICD-10-CM

## 2025-08-14 DIAGNOSIS — I10 ESSENTIAL HYPERTENSION: ICD-10-CM

## 2025-08-14 DIAGNOSIS — I10 PRIMARY HYPERTENSION: Primary | ICD-10-CM

## 2025-08-14 RX ORDER — SEMAGLUTIDE 2.68 MG/ML
2 INJECTION, SOLUTION SUBCUTANEOUS WEEKLY
COMMUNITY
Start: 2025-07-23

## 2025-08-14 RX ORDER — PRAZOSIN HYDROCHLORIDE 2 MG/1
2 CAPSULE ORAL NIGHTLY
COMMUNITY

## 2025-08-14 RX ORDER — HYDROCHLOROTHIAZIDE 25 MG/1
25 TABLET ORAL DAILY
Qty: 90 TABLET | Refills: 1 | Status: SHIPPED | OUTPATIENT
Start: 2025-08-14

## 2025-08-14 RX ORDER — PEN NEEDLE, DIABETIC 32GX 5/32"
NEEDLE, DISPOSABLE MISCELLANEOUS
COMMUNITY
Start: 2025-05-23

## 2025-08-14 RX ORDER — ATORVASTATIN CALCIUM 80 MG/1
80 TABLET, FILM COATED ORAL NIGHTLY
Qty: 90 TABLET | Refills: 1 | Status: SHIPPED | OUTPATIENT
Start: 2025-08-14

## 2025-08-14 RX ORDER — LISINOPRIL 40 MG/1
40 TABLET ORAL DAILY
Qty: 90 TABLET | Refills: 1 | Status: SHIPPED | OUTPATIENT
Start: 2025-08-14

## 2025-08-14 RX ORDER — QUETIAPINE FUMARATE 100 MG/1
1 TABLET, FILM COATED ORAL DAILY
COMMUNITY
Start: 2025-06-17

## 2025-08-14 RX ORDER — NAPROXEN 500 MG/1
500 TABLET ORAL AS NEEDED
COMMUNITY

## 2025-08-14 RX ORDER — METOPROLOL SUCCINATE 25 MG/1
25 TABLET, EXTENDED RELEASE ORAL DAILY
Qty: 90 TABLET | Refills: 1 | Status: SHIPPED | OUTPATIENT
Start: 2025-08-14

## 2025-08-14 RX ORDER — RANOLAZINE 500 MG/1
500 TABLET, EXTENDED RELEASE ORAL 2 TIMES DAILY
Qty: 180 TABLET | Refills: 1 | Status: SHIPPED | OUTPATIENT
Start: 2025-08-14

## 2025-08-14 RX ORDER — INSULIN GLARGINE 100 [IU]/ML
INJECTION, SOLUTION SUBCUTANEOUS DAILY
COMMUNITY
Start: 2025-06-28